# Patient Record
Sex: FEMALE | Race: WHITE | NOT HISPANIC OR LATINO | Employment: OTHER | URBAN - METROPOLITAN AREA
[De-identification: names, ages, dates, MRNs, and addresses within clinical notes are randomized per-mention and may not be internally consistent; named-entity substitution may affect disease eponyms.]

---

## 2018-03-06 ENCOUNTER — OFFICE VISIT (OUTPATIENT)
Dept: FAMILY MEDICINE | Facility: CLINIC | Age: 48
End: 2018-03-06
Payer: COMMERCIAL

## 2018-03-06 VITALS
HEIGHT: 64 IN | BODY MASS INDEX: 22.84 KG/M2 | DIASTOLIC BLOOD PRESSURE: 67 MMHG | WEIGHT: 133.8 LBS | HEART RATE: 71 BPM | TEMPERATURE: 97.8 F | SYSTOLIC BLOOD PRESSURE: 121 MMHG | OXYGEN SATURATION: 100 %

## 2018-03-06 DIAGNOSIS — Z00.00 ROUTINE GENERAL MEDICAL EXAMINATION AT A HEALTH CARE FACILITY: Primary | ICD-10-CM

## 2018-03-06 DIAGNOSIS — I34.1 MITRAL VALVE PROLAPSE DETERMINED BY IMAGING: ICD-10-CM

## 2018-03-06 DIAGNOSIS — R53.83 FATIGUE, UNSPECIFIED TYPE: ICD-10-CM

## 2018-03-06 DIAGNOSIS — Z86.79 H/O MITRAL VALVE PROLAPSE: ICD-10-CM

## 2018-03-06 LAB — HBA1C MFR BLD: 4.7 % (ref 4.3–6)

## 2018-03-06 PROCEDURE — 36415 COLL VENOUS BLD VENIPUNCTURE: CPT | Performed by: FAMILY MEDICINE

## 2018-03-06 PROCEDURE — 84443 ASSAY THYROID STIM HORMONE: CPT | Performed by: FAMILY MEDICINE

## 2018-03-06 PROCEDURE — 83036 HEMOGLOBIN GLYCOSYLATED A1C: CPT | Performed by: FAMILY MEDICINE

## 2018-03-06 PROCEDURE — 99386 PREV VISIT NEW AGE 40-64: CPT | Performed by: FAMILY MEDICINE

## 2018-03-06 RX ORDER — LORAZEPAM 0.5 MG/1
TABLET ORAL
Refills: 0 | COMMUNITY
Start: 2017-12-20 | End: 2018-11-01

## 2018-03-06 RX ORDER — ACYCLOVIR 400 MG/1
TABLET ORAL
Refills: 1 | COMMUNITY
Start: 2017-03-25 | End: 2022-06-23

## 2018-03-06 NOTE — NURSING NOTE
"Chief Complaint   Patient presents with     Physical       Initial /67  Pulse 71  Temp 97.8  F (36.6  C) (Oral)  Ht 5' 4\" (1.626 m)  Wt 133 lb 12.8 oz (60.7 kg)  SpO2 100%  BMI 22.97 kg/m2 Estimated body mass index is 22.97 kg/(m^2) as calculated from the following:    Height as of this encounter: 5' 4\" (1.626 m).    Weight as of this encounter: 133 lb 12.8 oz (60.7 kg).  Medication Reconciliation: complete      Health Maintenance that is potentially due pending provider review:  Pap Smear    Possibly completing today per provider review.    RIMA Mason  "

## 2018-03-06 NOTE — MR AVS SNAPSHOT
After Visit Summary   3/6/2018    Letitia Yee    MRN: [id number]           Patient Information     Date Of Birth          1970        Visit Information        Provider Department      3/6/2018 1:30 PM Shayan Warren MD [facility]        Today's Diagnoses     Routine general medical examination at a health care facility    -  1    Fatigue, unspecified type          Care Instructions      Preventive Health Recommendations  Female Ages 40 to 49    Yearly exam:     See your health care provider every year in order to  1. Review health changes.   2. Discuss preventive care.    3. Review your medicines if your doctor prescribed any.      Get a Pap test every three years (unless you have an abnormal result and your provider advises testing more often).      If you get Pap tests with HPV test, you only need to test every 5 years, unless you have an abnormal result. You do not need a Pap test if your uterus was removed (hysterectomy) and you have not had cancer.      You should be tested each year for STDs (sexually transmitted diseases), if you're at risk.       Ask your doctor if you should have a mammogram.      Have a colonoscopy (test for colon cancer) if someone in your family has had colon cancer or polyps before age 50.       Have a cholesterol test every 5 years.       Have a diabetes test (fasting glucose) after age 45. If you are at risk for diabetes, you should have this test every 3 years.    Shots: Get a flu shot each year. Get a tetanus shot every 10 years.     Nutrition:     Eat at least 5 servings of fruits and vegetables each day.    Eat whole-grain bread, whole-wheat pasta and brown rice instead of white grains and rice.    Talk to your provider about Calcium and Vitamin D.     Lifestyle    Exercise at least 150 minutes a week (an average of 30 minutes a day, 5 days a week). This will help you control your weight and prevent disease.    Limit alcohol to one  drink per day.    No smoking.     Wear sunscreen to prevent skin cancer.    See your dentist every six months for an exam and cleaning.    Preventive Health Recommendations  Female Ages 40 to 49    Yearly exam:     See your health care provider every year in order to  4. Review health changes.   5. Discuss preventive care.    6. Review your medicines if your doctor prescribed any.      Get a Pap test every three years (unless you have an abnormal result and your provider advises testing more often).      If you get Pap tests with HPV test, you only need to test every 5 years, unless you have an abnormal result. You do not need a Pap test if your uterus was removed (hysterectomy) and you have not had cancer.      You should be tested each year for STDs (sexually transmitted diseases), if you're at risk.       Ask your doctor if you should have a mammogram.      Have a colonoscopy (test for colon cancer) if someone in your family has had colon cancer or polyps before age 50.       Have a cholesterol test every 5 years.       Have a diabetes test (fasting glucose) after age 45. If you are at risk for diabetes, you should have this test every 3 years.    Shots: Get a flu shot each year. Get a tetanus shot every 10 years.     Nutrition:     Eat at least 5 servings of fruits and vegetables each day.    Eat whole-grain bread, whole-wheat pasta and brown rice instead of white grains and rice.    Talk to your provider about Calcium and Vitamin D.     Lifestyle    Exercise at least 150 minutes a week (an average of 30 minutes a day, 5 days a week). This will help you control your weight and prevent disease.    Limit alcohol to one drink per day.    No smoking.     Wear sunscreen to prevent skin cancer.    See your dentist every six months for an exam and cleaning.          Follow-ups after your visit        Follow-up notes from your care team     Return if symptoms worsen or fail to improve.      Future tests that were  "ordered for you today     Open Future Orders        Priority Expected Expires Ordered    Echocardiogram Complete Routine  3/6/2019 3/6/2018            Who to contact     If you have questions or need follow up information about today's clinic visit or your schedule please contact [facility] directly at [phone].  Normal or non-critical lab and imaging results will be communicated to you by MyChart, letter or phone within 4 business days after the clinic has received the results. If you do not hear from us within 7 days, please contact the clinic through [employer] or phone. If you have a critical or abnormal lab result, we will notify you by phone as soon as possible.  Submit refill requests through [employer] or call your pharmacy and they will forward the refill request to us. Please allow 3 business days for your refill to be completed.          Additional Information About Your Visit        MyChart Information     [employer] lets you send messages to your doctor, view your test results, renew your prescriptions, schedule appointments and more. To sign up, go to [email or web address] . Click on \"Log in\" on the left side of the screen, which will take you to the Welcome page. Then click on \"Sign up Now\" on the right side of the page.     You will be asked to enter the access code listed below, as well as some personal information. Please follow the directions to create your username and password.     Your access code is: [email or web address]  Expires: 2018  2:21 PM     Your access code will  in 90 days. If you need help or a new code, please call your [facility] clinic or [phone].        Care EveryWhere ID     This is your Care EveryWhere ID. This could be used by other organizations to access your [facility] medical records  [id number]        Your Vitals Were     Pulse Temperature Height Pulse Oximetry BMI (Body Mass Index)       71 97.8  F (36.6  C) (Oral) 5' 4\" (1.626 m) 100% 22.97 kg/m2        " Blood Pressure from Last 3 Encounters:   03/06/18 121/67    Weight from Last 3 Encounters:   03/06/18 133 lb 12.8 oz (60.7 kg)              We Performed the Following     Hemoglobin A1c     TSH        Primary Care Provider Office Phone # Fax #    [facility] [phone] [address]  [facility] [address]        Equal Access to Services     SARIAH AGUIRRE : Hadii aad ku hadasho Soomaali, waaxda luqadaha, qaybta kaalmada adeegyada, waxay idiin hayaan adeeg kharash la'aan ah. So [facility] [phone].    ATENCIÓN: Si habla español, tiene a rivera disposición servicios gratuitos de asistencia lingüística. Llame al [phone].    We comply with applicable federal civil rights laws and [place] laws. We do not discriminate on the basis of race, color, national origin, age, disability, sex, sexual orientation, or gender identity.            Thank you!     Thank you for choosing [facility]  for your care. Our goal is always to provide you with excellent care. Hearing back from our patients is one way we can continue to improve our services. Please take a few minutes to complete the written survey that you may receive in the mail after your visit with us. Thank you!             Your Updated Medication List - Protect others around you: Learn how to safely use, store and throw away your medicines at www.disposemymeds.org.          This list is accurate as of 3/6/18  2:21 PM.  Always use your most recent med list.                   Brand Name Dispense Instructions for use Diagnosis    acyclovir 400 MG tablet    ZOVIRAX     TK 1 T PO  BID. TK 1 T PO BID FOR 5 DAYS FOR EACH OUTBREAK        LORazepam 0.5 MG tablet    ATIVAN     TK 1 T PO Q 8 H PRA

## 2018-03-06 NOTE — PATIENT INSTRUCTIONS
Preventive Health Recommendations  Female Ages 40 to 49    Yearly exam:     See your health care provider every year in order to  1. Review health changes.   2. Discuss preventive care.    3. Review your medicines if your doctor prescribed any.      Get a Pap test every three years (unless you have an abnormal result and your provider advises testing more often).      If you get Pap tests with HPV test, you only need to test every 5 years, unless you have an abnormal result. You do not need a Pap test if your uterus was removed (hysterectomy) and you have not had cancer.      You should be tested each year for STDs (sexually transmitted diseases), if you're at risk.       Ask your doctor if you should have a mammogram.      Have a colonoscopy (test for colon cancer) if someone in your family has had colon cancer or polyps before age 50.       Have a cholesterol test every 5 years.       Have a diabetes test (fasting glucose) after age 45. If you are at risk for diabetes, you should have this test every 3 years.    Shots: Get a flu shot each year. Get a tetanus shot every 10 years.     Nutrition:     Eat at least 5 servings of fruits and vegetables each day.    Eat whole-grain bread, whole-wheat pasta and brown rice instead of white grains and rice.    Talk to your provider about Calcium and Vitamin D.     Lifestyle    Exercise at least 150 minutes a week (an average of 30 minutes a day, 5 days a week). This will help you control your weight and prevent disease.    Limit alcohol to one drink per day.    No smoking.     Wear sunscreen to prevent skin cancer.    See your dentist every six months for an exam and cleaning.    Preventive Health Recommendations  Female Ages 40 to 49    Yearly exam:     See your health care provider every year in order to  4. Review health changes.   5. Discuss preventive care.    6. Review your medicines if your doctor prescribed any.      Get a Pap test every three years (unless you  have an abnormal result and your provider advises testing more often).      If you get Pap tests with HPV test, you only need to test every 5 years, unless you have an abnormal result. You do not need a Pap test if your uterus was removed (hysterectomy) and you have not had cancer.      You should be tested each year for STDs (sexually transmitted diseases), if you're at risk.       Ask your doctor if you should have a mammogram.      Have a colonoscopy (test for colon cancer) if someone in your family has had colon cancer or polyps before age 50.       Have a cholesterol test every 5 years.       Have a diabetes test (fasting glucose) after age 45. If you are at risk for diabetes, you should have this test every 3 years.    Shots: Get a flu shot each year. Get a tetanus shot every 10 years.     Nutrition:     Eat at least 5 servings of fruits and vegetables each day.    Eat whole-grain bread, whole-wheat pasta and brown rice instead of white grains and rice.    Talk to your provider about Calcium and Vitamin D.     Lifestyle    Exercise at least 150 minutes a week (an average of 30 minutes a day, 5 days a week). This will help you control your weight and prevent disease.    Limit alcohol to one drink per day.    No smoking.     Wear sunscreen to prevent skin cancer.    See your dentist every six months for an exam and cleaning.

## 2018-03-06 NOTE — PROGRESS NOTES
SUBJECTIVE:   CC: Letitia Yee is an 47 year old woman who presents for preventive health visit.     Healthy Habits:    Do you get at least three servings of calcium containing foods daily (dairy, green leafy vegetables, etc.)? yes    Amount of exercise or daily activities, outside of work: 6 day(s) per week    Problems taking medications regularly No    Medication side effects: No    Have you had an eye exam in the past two years? no    Do you see a dentist twice per year? no    Do you have sleep apnea, excessive snoring or daytime drowsiness?no      The patient past medical history is significant for mitral valve prolapse. She reports that it was being followed by [facility] yrs ago. She was evaluated for syncopal episode 15 yrs ago which was attributed to the mitral valve prolapse. Today, she reports intermittent episodes of sudden severe fatigue and lack of energy, feeling that she is about to faint but does not. Episodes have been occurring 2 times per week. She denies noticing any palpitations, shortness of breath or chest pains during these episodes. Did not correlate it to food intake.  The patient is athletic she lifts weights 5-6 times per week.  She is also an avid biker and tries to bike as often as she could. Able to bike up to 30 miles without concerns.       Today's PHQ-2 Score: No flowsheet data found.    Abuse: Current or Past(Physical, Sexual or Emotional)- No  Do you feel safe in your environment - Yes    Social History   Substance Use Topics     Smoking status: Not on file     Smokeless tobacco: Not on file     Alcohol use Not on file     If you drink alcohol do you typically have >3 drinks per day or >7 drinks per week? No                     Reviewed orders with patient.  Reviewed health maintenance and updated orders accordingly - Yes    Mammo discussed, not appropriate for or declined by this patient.    Pertinent mammograms are reviewed under the imaging tab.  History of abnormal Pap  "smear: NO - age 30-65 PAP every 5 years with negative HPV co-testing recommended. Last done in 04/2015    Reviewed and updated as needed this visit by clinical staff  Allergies  Meds         Reviewed and updated as needed this visit by Provider            ROS:  C: NEGATIVE for fever, chills, change in weight  I: NEGATIVE for worrisome rashes, moles or lesions  E: NEGATIVE for vision changes or irritation  ENT: NEGATIVE for ear, mouth and throat problems  R: NEGATIVE for significant cough or SOB  B: NEGATIVE for masses, tenderness or discharge  CV: NEGATIVE for chest pain, palpitations or peripheral edema  GI: NEGATIVE for nausea, abdominal pain, heartburn, or change in bowel habits  : NEGATIVE for unusual urinary or vaginal symptoms. Periods are regular.  M: NEGATIVE for significant arthralgias or myalgia  N: NEGATIVE for weakness, dizziness or paresthesias  P: NEGATIVE for changes in mood or affect    OBJECTIVE:   /67  Pulse 71  Temp 97.8  F (36.6  C) (Oral)  Ht 5' 4\" (1.626 m)  Wt 133 lb 12.8 oz (60.7 kg)  SpO2 100%  BMI 22.97 kg/m2  EXAM:  GENERAL: healthy, alert and no distress  NECK: no adenopathy, no asymmetry, masses, or scars and thyroid normal to palpation  RESP: lungs clear to auscultation - no rales, rhonchi or wheezes  CV: regular rate and rhythm, normal S1 S2, no S3 or S4, no murmur, click or rub, no peripheral edema and peripheral pulses strong  ABDOMEN: soft, nontender, no hepatosplenomegaly, no masses and bowel sounds normal  MS: no gross musculoskeletal defects noted, no edema    ASSESSMENT/PLAN:       ICD-10-CM    1. Routine general medical examination at a health care facility Z00.00    2. Fatigue, unspecified type R53.83 TSH     Hemoglobin A1c   3. H/O mitral valve prolapse Z86.79 Echocardiogram Complete     Causes of current intermittent episodes of sudden fatigue and weakness were discussed with the patient. Patient was advised to keep a diary with food, exercise and ADLs prior " to these episodes. Will start with TSH and HgA1C. Patient was advised to get the Echo to evaluate for worsening mitral valve prolapse.     COUNSELING:   Reviewed preventive health counseling, as reflected in patient instructions       Regular exercise       Healthy diet/nutrition     has no tobacco history on file.    There is no height or weight on file to calculate BMI.       Counseling Resources:  ATP IV Guidelines  Pooled Cohorts Equation Calculator  Breast Cancer Risk Calculator  FRAX Risk Assessment  ICSI Preventive Guidelines  Dietary Guidelines for Americans, 2010  USDA's MyPlate  ASA Prophylaxis  Lung CA Screening    Shayan Warren MD  [facility]

## 2018-03-07 LAB — TSH SERPL DL<=0.005 MIU/L-ACNC: 2.87 MU/L (ref 0.4–4)

## 2018-04-20 ENCOUNTER — HOSPITAL ENCOUNTER (OUTPATIENT)
Dept: CARDIOLOGY | Facility: CLINIC | Age: 48
Discharge: HOME | End: 2018-04-20
Attending: FAMILY MEDICINE | Admitting: FAMILY MEDICINE
Payer: COMMERCIAL

## 2018-04-20 DIAGNOSIS — Z86.79 H/O MITRAL VALVE PROLAPSE: ICD-10-CM

## 2018-04-20 PROCEDURE — 93306 TTE W/DOPPLER COMPLETE: CPT

## 2018-04-20 PROCEDURE — 93306 TTE W/DOPPLER COMPLETE: CPT | Mod: 26 | Performed by: INTERNAL MEDICINE

## 2018-04-20 NOTE — PROGRESS NOTES
Dear Letitia,   We received the results of your echocardiogram.  Results shows mild enlargement of the left ventricle of the heart.  It also shows moderate mitral regurgitation which means that the valve leaks the blood back to the left ventricle. They also noted the moderate mitral valve prolapse.   I would recommend seeing a cardiology to address these abnormalities. This should not alarm you, your heart has been functioning with these abnormalities for months or maybe years.     I placed a referral to the [facility]     [address]    [id number]    [place] [address]    Appointments:   [phone]     Let us know if you have any questions, I can give you a call on Monday.     Take Care   Shayan Warren MD

## 2018-04-25 ENCOUNTER — TELEPHONE (OUTPATIENT)
Dept: OTHER | Facility: CLINIC | Age: 48
End: 2018-04-25

## 2018-04-26 ENCOUNTER — OFFICE VISIT (OUTPATIENT)
Dept: CARDIOLOGY | Facility: CLINIC | Age: 48
End: 2018-04-26
Attending: FAMILY MEDICINE
Payer: COMMERCIAL

## 2018-04-26 VITALS
HEIGHT: 64 IN | WEIGHT: 136.1 LBS | HEART RATE: 60 BPM | DIASTOLIC BLOOD PRESSURE: 80 MMHG | BODY MASS INDEX: 23.23 KG/M2 | OXYGEN SATURATION: 100 % | SYSTOLIC BLOOD PRESSURE: 130 MMHG

## 2018-04-26 DIAGNOSIS — I34.0 MITRAL REGURGITATION, MYXOMATOUS: Primary | ICD-10-CM

## 2018-04-26 PROCEDURE — 93000 ELECTROCARDIOGRAM COMPLETE: CPT | Performed by: INTERNAL MEDICINE

## 2018-04-26 PROCEDURE — 99204 OFFICE O/P NEW MOD 45 MIN: CPT | Performed by: INTERNAL MEDICINE

## 2018-04-26 RX ORDER — ALBUTEROL SULFATE 90 UG/1
2 AEROSOL, METERED RESPIRATORY (INHALATION) EVERY 4 HOURS PRN
COMMUNITY
End: 2018-08-23

## 2018-04-26 NOTE — PATIENT INSTRUCTIONS
1. Cardiac MRI.    2. Labs - NT pro BNP    3. Chest x-ray.    4. Cardiac pulmonary exercise testing. This will be done at the [facility].    5. Follow up in my clinic to discuss test results.    If you have any questions or concerns, please call my nurse Lyla Cisneros at [phone].

## 2018-04-26 NOTE — LETTER
4/26/2018      [facility]  [address]  [facility] [address]      RE: Letitia Yee       Dear Colleague,    I had the pleasure of seeing Letitia Yee in the [facility].    Service Date: 04/26/2018      PRIMARY CARE AND REFERRING PROVIDER:  Shayan Warren MD      REASON FOR VISIT:  Finding of mitral regurgitation secondary to mitral valve prolapse on echocardiogram.      HISTORY OF PRESENT ILLNESS:  Ms. Yee is a very pleasant, 47-year-old  lady who is accompanied by her , Samuel, who is an .  The patient is 47 years old,  with 1 child with no other significant medical history.  She is a former smoker who quit several years ago.  Even when she was a child, she was aware of mitral valve prolapse and had a murmur, which has not been followed for several years in adulthood.  More recently, she was evaluated because the murmur was louder on auscultation, and she had nonspecific fatigue.  She was found to have at least moderate mitral regurgitation leading to this referral.      In terms of symptoms, the primary one is that of fatigue.  It is very nonspecific by description and has been present for at least a few years.  However, the patient enjoys quite an active lifestyle.  She has a young child, participates in [employer] dance classes a few times a month and is able to do this without limitation.  There are certainly no features of symptoms of heart failure, such as dyspnea on exertion, lower extremity edema, orthopnea or PND.  She has rare palpitations, but does not have a previous diagnosis of atrial fibrillation.  There is no concerning family history.        I personally reviewed her echocardiogram images.  Both mitral valve leaflets are significantly thickened consistent with a myxomatous mitral valve disease, but possible prolapse of the anterior leaflet causing a posturally directed eccentric  jet of mitral regurgitation that is at least moderate in severity.  The mitral valve hemodynamics were not reliable due to the eccentricity of the jet.  Her left ventricle was mildly dilated (the end-diastolic diameter was 6.1 cm and systolic 3.5 cm), LVEF was only visually estimated but not quantified at 60%-65%, left atrium was moderately dilated with a volume index of 40.1 mL/m2, RV function was normal, RVSP could not be estimated, there was trace to mild tricuspid regurgitation, and she was in sinus rhythm.      ECG done today showed sinus bradycardia of 54 BPM with normal cardiac intervals.      Her recent TSH was 2.8.  I do not have any other labs or chest x-ray.      Please see my attached note in Epic for a comprehensive review of systems, past medical, surgical, social and family history.         PHYSICAL EXAMINATION:   VITAL SIGNS:  /80, pulse 60 per minute and regular, height 1.626 m (5 feet 4 inches), weight 61.7 kg (136 pounds), saturations 100% on room air, BMI 23.4 kg/m2.   CONSTITUTIONAL:  Slim, comfortable at rest.   EYES:  No pallor or icterus.   ENT:  Good dental hygiene.  No cyanosis or pallor.   CARDIOVASCULAR:  Her jugular venous pressure is normal.  Carotid pulse is normal.  Apical impulse is undisplaced and normal to palpation without heave or thrill.  Regular heart sounds.  She has a well-audible mid systolic click as well as a mid to late systolic murmur consistent with her echocardiogram findings.  No diastolic rumble.     RESPIRATORY:  Bilateral good air entry.     GASTROINTESTINAL:  Soft, nontender.     NEUROPSYCHIATRIC:  Oriented x3.  Normal affect.     EXTREMITIES:  No edema or clubbing.      DIAGNOSES:   1.  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   2.  Mild left ventricular dilatation, LVEF of 60%-65%.   3.  Chronic fatigue.      ASSESSMENT/PLAN:  I had a detailed conversation with  the patient and her  and explained the complex etiology of myxomatous mitral valve disease with the spectrum varying from isolated mitral valve prolapse to bileaflet degeneration as she has, the mechanism of mitral regurgitation, the parameters that would help us decide when to intervene and that currently surgery is the only treatment.  Based on her physical examination and echocardiogram, I do not think we have sufficient data to ascertain the true severity of the mitral regurgitation.  The jet is eccentric, both leaflets are thickened, but what points to more than moderate severity is the fact that her left ventricle is mildly dilated, we do not have an accurate estimate of the LVEF, and her left atrium is also moderately dilated with some systolic blunting in the pulmonary vein Doppler.  Given her young age and nonspecific symptoms of fatigue, I think additional testing to ascertain the degree of mitral regurgitation, extent of symptomatic limitation and LV size and LVEF would help us determine if we need to intervene on her sooner or wait longer and perform imaging surveillance.      Due to this, I recommend the followin.  Cardiac MRI.   2.  Complete set of labs, including NT-proBNP.   3.  Chest x-ray.     4.  Cardiopulmonary exercise testing.  This is not done here at [facility], and she is open to going to the [facility].   5.  Follow up in my clinic to discuss test results.      The patient had several questions regarding the need for this additional testing, and I tried to explain it to her to the best of my ability.  I spent 45 minutes with the patient; greater than 50% of the time was spent in counseling and coordination of care.      cc:   Shayan Warren MD   [address] BETINA PAN MD          D: 2018   T: 2018   MT: seven      Name:     MARKO SALDAÑA   MRN:      [id number]         Account:      [id number]   :      1970           Service Date: 2018      Document: [id number]         Outpatient Encounter Prescriptions as of 2018   Medication Sig Dispense Refill     acyclovir (ZOVIRAX) 400 MG tablet TK 1 T PO  BID. TK 1 T PO BID FOR 5 DAYS FOR EACH OUTBREAK  1     albuterol (PROAIR HFA/PROVENTIL HFA/VENTOLIN HFA) 108 (90 Base) MCG/ACT Inhaler Inhale 2 puffs into the lungs every 4 hours as needed for shortness of breath / dyspnea or wheezing       LORazepam (ATIVAN) 0.5 MG tablet TK 1 T PO Q 8 H PRA  0     No facility-administered encounter medications on file as of 2018.      Again, thank you for allowing me to participate in the care of your patient.      Sincerely,    Kelli Solis MD     [facility]
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
## 2018-04-26 NOTE — MR AVS SNAPSHOT
After Visit Summary   4/26/2018    Letitia Yee    MRN: [id number]           Patient Information     Date Of Birth          1970        Visit Information        Provider Department      4/26/2018 11:15 AM Kelli Solis MD [facility]        Today's Diagnoses     Mitral regurgitation, myxomatous    -  1      Care Instructions    1. Cardiac MRI.    2. Labs - NT pro BNP    3. Chest x-ray.    4. Cardiac pulmonary exercise testing. This will be done at the [facility].    5. Follow up in my clinic to discuss test results.    If you have any questions or concerns, please call my nurse Lyla Cisneros at [phone].          Follow-ups after your visit        Additional Services     Follow-Up with Pulmonary Hypertension Clinic                 Future tests that were ordered for you today     Open Future Orders        Priority Expected Expires Ordered    Echocardiogram Routine 5/17/2018 4/26/2019 4/26/2018    Follow-Up with Pulmonary Hypertension Clinic Routine 5/26/2018 4/26/2019 4/26/2018    Holter Monitor 24 hour - Adult Routine 4/30/2018 6/10/2018 4/26/2018    XR Chest 2 Views Routine 5/3/2018 4/26/2019 4/26/2018    N terminal pro BNP outpatient Routine 5/3/2018 4/26/2019 4/26/2018    MRI Cardiac w/contrast Routine 4/30/2018 4/26/2019 4/26/2018            Who to contact     If you have questions or need follow up information about today's clinic visit or your schedule please contact [facility] directly at [phone].  Normal or non-critical lab and imaging results will be communicated to you by MyChart, letter or phone within 4 business days after the clinic has received the results. If you do not hear from us within 7 days, please contact the clinic through MyChart or phone. If you have a critical or abnormal lab result, we will notify you by phone as soon as possible.  Submit refill requests  "through [employer] or call your pharmacy and they will forward the refill request to us. Please allow 3 business days for your refill to be completed.          Additional Information About Your Visit        Bubbleballhart Information     [employer] gives you secure access to your electronic health record. If you see a primary care provider, you can also send messages to your care team and make appointments. If you have questions, please call your primary care clinic.  If you do not have a primary care provider, please call [phone] and they will assist you.        Care EveryWhere ID     This is your Care EveryWhere ID. This could be used by other organizations to access your [facility] medical records  [id number]        Your Vitals Were     Pulse Height Pulse Oximetry BMI (Body Mass Index)          60 1.626 m (5' 4\") 100% 23.36 kg/m2         Blood Pressure from Last 3 Encounters:   04/26/18 130/80   03/06/18 121/67    Weight from Last 3 Encounters:   04/26/18 61.7 kg (136 lb 1.6 oz)   03/06/18 60.7 kg (133 lb 12.8 oz)              We Performed the Following     EKG 12-lead complete w/read - Clinics        Primary Care Provider Office Phone # Fax #    [facility] [phone] [address]  [facility] [address]        Equal Access to Services     SARIAH AGUIRRE : Hadii aad ku hadasho Soomaali, waaxda luqadaha, qaybta kaalmada adeegyada, waxay idiin haykaren phillips . So [facility] [phone].    ATENCIÓN: Si habla español, tiene a rivera disposición servicios gratuitos de asistencia lingüística. Llame al [phone].    We comply with applicable federal civil rights laws and [place] laws. We do not discriminate on the basis of race, color, national origin, age, disability, sex, sexual orientation, or gender identity.            Thank you!     Thank you for choosing [facility]  for your care. Our goal is always to provide you with excellent " care. Hearing back from our patients is one way we can continue to improve our services. Please take a few minutes to complete the written survey that you may receive in the mail after your visit with us. Thank you!             Your Updated Medication List - Protect others around you: Learn how to safely use, store and throw away your medicines at www.disposemymeds.org.          This list is accurate as of 4/26/18 12:10 PM.  Always use your most recent med list.                   Brand Name Dispense Instructions for use Diagnosis    acyclovir 400 MG tablet    ZOVIRAX     TK 1 T PO  BID. TK 1 T PO BID FOR 5 DAYS FOR EACH OUTBREAK        albuterol 108 (90 Base) MCG/ACT Inhaler    PROAIR HFA/PROVENTIL HFA/VENTOLIN HFA     Inhale 2 puffs into the lungs every 4 hours as needed for shortness of breath / dyspnea or wheezing        LORazepam 0.5 MG tablet    ATIVAN     TK 1 T PO Q 8 H PRA

## 2018-05-08 ENCOUNTER — DOCUMENTATION ONLY (OUTPATIENT)
Dept: CARDIOLOGY | Facility: CLINIC | Age: 48
End: 2018-05-08

## 2018-05-08 NOTE — PROGRESS NOTES
Patient had sent me a [employer]ar[employer] message last week, inquiring about the rationale for additional testing.  I called her back today and had a detailed conversation.  As outlined in my clinic visit note, the main reason to undergo cardiac MRI is to ascertain whether she has more than moderate mitral regurgitation.  The factors that are incongruent with just moderate mitral regurgitation are -  her left ventricle is mildly dilated, LVEF appears less than 65%, she has at least moderate atrial enlargement.  The description of her fatigue is also rather nonspecific and cardiopulmonary exercise stress testing will be helpful.    Patient shared that her current insurance coverage is less than optimal, and she would like to postpone her testing to 3 months down the line, when she will have additional insurance coverage.  I think this is reasonable.    She has my business contact details and will call us to schedule further testing and clinic follow-up.    Dr. RAZIA Solis MD  [facility] [place].
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
## 2018-05-08 NOTE — PROGRESS NOTES
Service Date: 04/26/2018      PRIMARY CARE AND REFERRING PROVIDER:  Shayan Warren MD      REASON FOR VISIT:  Finding of mitral regurgitation secondary to mitral valve prolapse on echocardiogram.      HISTORY OF PRESENT ILLNESS:    Ms. Yee is a very pleasant, 47-year-old  lady who is accompanied by her , Samuel, who is an .  The patient is 47 years old,  with 1 child with no other significant medical history.  She is a former smoker who quit several years ago.      I am seeing her for mitral regurgitation. Even when she was a child, she was aware of mitral valve prolapse and had a murmur, which has not been followed for several years in adulthood.  More recently, she was evaluated for non-specific fatigue and the the murmur was thought to be louder on auscultation. Echocardiogram showed at least moderate mitral regurgitation leading to this referral.      He primary symptoms is fatigue. It is nonspecific by description and has been present for at least a few years.  However, the patient enjoys quite an active lifestyle.  She has a young child, participates in [employer] dance classes a few times a month and is able to do this without limitation.  She denies dyspnea on exertion, lower extremity edema, orthopnea or PND.  She has rare palpitations, but does not have a previous diagnosis of atrial fibrillation.  There is no concerning family history.        I personally reviewed her echocardiogram images.  Both mitral valve leaflets are significantly thickened consistent with myxomatous mitral valve disease, with possible prolapse of the anterior leaflet causing a posturally directed eccentric jet of mitral regurgitation that is at least moderate in severity.  The mitral valve hemodynamics were not reliable due to the eccentricity of the jet.  Her left ventricle was borderline dilated (the end-diastolic diameter was 6.1 cm and end systolic 3.5 cm), LVEF was only visually  estimated at 60%-65%, left atrium was moderately dilated with a volume index of 40.1 mL/m2, RV function was normal, RVSP could not be estimated, there was trace to mild tricuspid regurgitation, and she was in sinus rhythm.      ECG done today showed sinus bradycardia of 54 BPM with normal cardiac intervals.      Her recent TSH was 2.8.  I do not have any other labs or chest x-ray.      Please see my attached note in Epic for a comprehensive review of systems, past medical, surgical, social and family history.         PHYSICAL EXAMINATION:   VITAL SIGNS:  /80, pulse 60 per minute and regular, height 1.626 m (5 feet 4 inches), weight 61.7 kg (136 pounds), saturations 100% on room air, BMI 23.4 kg/m2.   CONSTITUTIONAL:  Slim, comfortable at rest.   EYES:  No pallor or icterus.   ENT:  Good dental hygiene.  No cyanosis or pallor.   CARDIOVASCULAR:  Her jugular venous pressure is normal.  Carotid pulse is normal.  Apical impulse is undisplaced and normal to palpation without heave or thrill.  Regular heart sounds.  She has a well-audible mid systolic click and a mid to late systolic murmur consistent with her echocardiogram findings.  No diastolic rumble.     RESPIRATORY:  Bilateral good air entry.     GASTROINTESTINAL:  Soft, nontender.     NEUROPSYCHIATRIC:  Oriented x3.  Normal affect.     EXTREMITIES:  No edema or clubbing.      DIAGNOSES:   1.  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   2.  Mild left ventricular dilatation, LVEF of 60%-65%.   3.  Chronic fatigue.      ASSESSMENT/PLAN:    I had a detailed conversation with the patient and her  and explained the complex etiology of myxomatous mitral valve disease with a spectrum varying from isolated mitral valve prolapse to bileaflet involvement as she has, the mechanism of mitral regurgitation, the parameters that would help us decide when to intervene and  that currently surgery is the only treatment.  Based on her physical examination and echocardiogram, I do not think we have sufficient data to ascertain the true severity of the mitral regurgitation.  The jet is eccentric, both leaflets are thickened, but what suggests more than moderate severity is the fact that her left ventricle is mildly dilated, we do not have an accurate estimate of the LVEF, and her left atrium is also moderately dilated with some systolic blunting in the pulmonary vein Doppler.  Given her young age and nonspecific symptoms of fatigue, I think additional testing to ascertain the degree of mitral regurgitation, extent of symptomatic limitation and LV size and LVEF would help us determine if we need to intervene on her sooner or wait longer and perform imaging surveillance.      I recommend the followin.  Cardiac MRI.   2.  Complete set of labs, including NT-proBNP.   3.  Chest x-ray.     4.  Cardiopulmonary exercise testing.  This is not done here at [facility], and she is open to going to the [facility].   5.  Follow up in my clinic to discuss test results.      The patient had several questions regarding the need for this additional testing, and I tried to explain it to her to the best of my ability.  I spent 50 minutes with the patient; greater than 50% of the time was spent in counseling and coordination of care.      cc:   Shayan Warren MD   [address] BETINA PAN MD             D: 2018   T: 2018   MT: seven      Name:     MARKO SALDAÑA   MRN:      [id number]        Account:      [id number]   :      1970           Service Date: 2018      Document: [id number]

## 2018-06-04 ENCOUNTER — NURSE TRIAGE (OUTPATIENT)
Dept: NURSING | Facility: CLINIC | Age: 48
End: 2018-06-04

## 2018-06-04 NOTE — TELEPHONE ENCOUNTER
"I connected with scheduling for an appointment.  Nathalie Montana RN-BC  [facility]      Reason for Disposition    Eyelid is red and painful (or tender to touch)     Lump on the inside of the eye lid. She just noticed it while talking on the phone.    Additional Information    Negative: Unresponsive, passed out or very weak     Took Zyrtec for two nights. Eye is crusted shut.    Negative: Difficulty breathing or wheezing    Negative: [1] Difficulty swallowing or slurred speech AND [2] sudden onset    Negative: Sounds like a life-threatening emergency to the triager    Negative: Recent injury to the eye    Negative: Entire face is swollen    Negative: Sacs of clear fluid (blisters) on whites of eyes (allergic cysts)    Negative: Contact with pollen, other allergic substance or eyedrops    Negative: [1] Bee sting AND [2] within last 24 hours    Negative: Insect bite suspected    Negative: Sty suspected (small, painful red lump present on lid margin    Negative: Yellow or green discharge (pus) in the eye    Negative: Redness of white area (sclera) of eye(s)    Negative: [1] SEVERE eyelid swelling (i.e., shut or almost) AND [2] fever    Negative: [1] Eyelid (outer) is very red AND [2] fever    Negative: Patient sounds very sick or weak to the triager    Negative: [1] Pregnant > 20 weeks AND [2] sudden weight gain (i.e., more than 3 lbs or 1.4 kg in one week)    Negative: [1] SEVERE eyelid swelling (i.e., shut or almost) AND [2] involves both eyes      (Exception: itchy eyes, which  are probably an allergic reaction)    Negative: [1] SEVERE eyelid swelling on one side AND [2] red and painful (or tender to touch)    Negative: [1] SEVERE eyelid swelling on one side AND [2] sinus pain or pressure    Negative: [1] MILD swelling AND [2] fever    Negative: [1] Painful rash AND [2] multiple small blisters grouped together (i.e., dermatomal distribution or \"band\" or \"stripe\")    Negative: [1] SEVERE eyelid swelling " (i.e., shut or almost) AND [2] involves both eyes AND [2] itchy    Negative: MODERATE-SEVERE eyelid swelling on one side  (Exception: due to a mosquito bite)    Negative: [1] MILD eyelid swelling (puffiness) AND [2] sinus pain or pressure    Protocols used: EYE - SWELLING-ADULT-AH

## 2018-06-07 ENCOUNTER — OFFICE VISIT (OUTPATIENT)
Dept: FAMILY MEDICINE | Facility: CLINIC | Age: 48
End: 2018-06-07
Payer: COMMERCIAL

## 2018-06-07 VITALS
SYSTOLIC BLOOD PRESSURE: 120 MMHG | OXYGEN SATURATION: 98 % | WEIGHT: 133 LBS | RESPIRATION RATE: 16 BRPM | HEART RATE: 76 BPM | TEMPERATURE: 98.1 F | BODY MASS INDEX: 22.71 KG/M2 | DIASTOLIC BLOOD PRESSURE: 72 MMHG | HEIGHT: 64 IN

## 2018-06-07 DIAGNOSIS — H00.012 HORDEOLUM EXTERNUM OF RIGHT LOWER EYELID: ICD-10-CM

## 2018-06-07 DIAGNOSIS — G43.109 MIGRAINE WITH AURA AND WITHOUT STATUS MIGRAINOSUS, NOT INTRACTABLE: ICD-10-CM

## 2018-06-07 DIAGNOSIS — H00.022 HORDEOLUM INTERNUM OF RIGHT LOWER EYELID: Primary | ICD-10-CM

## 2018-06-07 PROCEDURE — 99214 OFFICE O/P EST MOD 30 MIN: CPT | Performed by: FAMILY MEDICINE

## 2018-06-07 RX ORDER — GENTAMICIN SULFATE 3 MG/ML
1 SOLUTION/ DROPS OPHTHALMIC 3 TIMES DAILY
Qty: 2 ML | Refills: 0 | Status: SHIPPED | OUTPATIENT
Start: 2018-06-07 | End: 2018-06-14

## 2018-06-07 NOTE — MR AVS SNAPSHOT
After Visit Summary   6/7/2018    Letitia Yee    MRN: [id number]           Patient Information     Date Of Birth          1970        Visit Information        Provider Department      6/7/2018 1:20 PM Nicky Lopez MD [facility]        Today's Diagnoses     Hordeolum internum of right lower eyelid    -  1    Hordeolum externum of right lower eyelid        Migraine with aura and without status migrainosus, not intractable          Care Instructions      Sty (or Stye)  A sty is an infection of the oil gland of the eyelid. It may develop into a small pocket of pus (an abscess). This can cause pain, redness, and swelling. In early stages, a sty is treated with antibiotic cream, eye drops, or a small towel soaked in warm water (a warm compress). More severe cases may need to be opened and drained by a healthcare provider.  Home care    Eye drops or ointment are usually prescribed to treat the infection. Use these as directed.     Artificial tears may also be used to lubricate the eye and make it more comfortable. You can buy these over the counter without a prescription. Talk with your healthcare provider before using any over-the-counter treatment for a sty.    Apply a warm, damp towel to the affected eye for at least 5 minutes, 3 to 4 times a day for a week. Warm compresses open the pores and speed the healing. But if the compresses are too hot, they may burn your eyelid.    Sometimes the sty will drain with this treatment alone. If this happens, keep using the antibiotic until all the redness and swelling are gone.    Wash your hands before and after touching the infected eyelid to avoid spreading the infection.    Don t squeeze or try to break open the sty.  Follow-up care  Follow up with your healthcare provider, or as advised.   When to seek medical advice  Call your healthcare provider right away if any of these occur:    Increase in swelling or redness around the  eyelid after 48 to 72 hours    Increase in eye pain or the eyelid blisters    Increase in warmth--the eyelid feels hot    Drainage of blood or thick pus from the sty    Blister on the eyelid    Inability to open the eyelid due to swelling    Fever of 100.4 F (38 C) or above, or as directed by your provider    Vision changes    Headache or stiff neck    The sty comes back  Date Last Reviewed: 8/1/2017 2000-2017 [employer]. [address] [place] [address]. All rights reserved. This information is not intended as a substitute for professional medical care. Always follow your healthcare professional's instructions.      [facility],Phone [phone]            Follow-ups after your visit        Additional Services     OPHTHALMOLOGY ADULT REFERRAL       Your provider has referred you to: [facility]: Eye [facility] - [place] [phone]   [email or web address]  FHN: [facility]. [facility] [place] [phone] [email or web address]  N: [facility] [phone]    Please be aware that coverage of these services is subject to the terms and limitations of your health insurance plan.  Call member services at your health plan with any benefit or coverage questions.      Please bring the following with you to your appointment:    (1) Any X-Rays, CTs or MRIs which have been performed.  Contact the facility where they were done to arrange for  prior to your scheduled appointment.    (2) List of current medications  (3) This referral request   (4) Any documents/labs given to you for this referral                  Who to contact     If you have questions or need follow up information about today's clinic visit or your schedule please contact [facility] directly at [phone].  Normal or non-critical lab and imaging results will be communicated to you by MyChart, letter or phone within 4 business  "days after the clinic has received the results. If you do not hear from us within 7 days, please contact the clinic through [employer] or phone. If you have a critical or abnormal lab result, we will notify you by phone as soon as possible.  Submit refill requests through [employer] or call your pharmacy and they will forward the refill request to us. Please allow 3 business days for your refill to be completed.          Additional Information About Your Visit        [employer] Information     [employer] gives you secure access to your electronic health record. If you see a primary care provider, you can also send messages to your care team and make appointments. If you have questions, please call your primary care clinic.  If you do not have a primary care provider, please call [phone] and they will assist you.        Care EveryWhere ID     This is your Care EveryWhere ID. This could be used by other organizations to access your [place] medical records  [id number]        Your Vitals Were     Pulse Temperature Respirations Height Last Period Pulse Oximetry    76 98.1  F (36.7  C) (Oral) 16 5' 4\" (1.626 m) (LMP Unknown) 98%    Breastfeeding? BMI (Body Mass Index)                No 22.83 kg/m2           Blood Pressure from Last 3 Encounters:   06/07/18 120/72   04/26/18 130/80   03/06/18 121/67    Weight from Last 3 Encounters:   06/07/18 133 lb (60.3 kg)   04/26/18 136 lb 1.6 oz (61.7 kg)   03/06/18 133 lb 12.8 oz (60.7 kg)              We Performed the Following     OPHTHALMOLOGY ADULT REFERRAL          Today's Medication Changes          These changes are accurate as of 6/7/18  1:57 PM.  If you have any questions, ask your nurse or doctor.               Start taking these medicines.        Dose/Directions    gentamicin 0.3 % ophthalmic solution   Commonly known as:  GARAMYCIN   Used for:  Hordeolum internum of right lower eyelid, Hordeolum externum of right lower eyelid   Started by:  Nicky Lopez MD        " Dose:  1 drop   Place 1 drop into both eyes 3 times daily for 7 days   Quantity:  2 mL   Refills:  0            Where to get your medicines      These medications were sent to [id number] - [address] AT [facility] [place]  [address], [facility] [address]     Phone:  [phone]     gentamicin 0.3 % ophthalmic solution                Primary Care Provider Office Phone # Fax #    [facility] [phone] [address]  [facility] [address]        Equal Access to Services     SARIAH AGUIRRE : Hadii aad ku hadasho Soomaali, waaxda luqadaha, qaybta kaalmada adeegyada, waxay idiin hayaan adegonzalo lacy. So [facility] [phone].    ATENCIÓN: Si habla español, tiene a rivera disposición servicios gratuitos de asistencia lingüística. [facility] [phone].    We comply with applicable federal civil rights laws and [place] laws. We do not discriminate on the basis of race, color, national origin, age, disability, sex, sexual orientation, or gender identity.            Thank you!     Thank you for choosing [facility]  for your care. Our goal is always to provide you with excellent care. Hearing back from our patients is one way we can continue to improve our services. Please take a few minutes to complete the written survey that you may receive in the mail after your visit with us. Thank you!             Your Updated Medication List - Protect others around you: Learn how to safely use, store and throw away your medicines at www.disposemymeds.org.          This list is accurate as of 6/7/18  1:57 PM.  Always use your most recent med list.                   Brand Name Dispense Instructions for use Diagnosis    acyclovir 400 MG tablet    ZOVIRAX     TK 1 T PO  BID. TK 1 T PO BID FOR 5 DAYS FOR EACH OUTBREAK        albuterol 108 (90 Base) MCG/ACT Inhaler    PROAIR HFA/PROVENTIL HFA/VENTOLIN HFA      Inhale 2 puffs into the lungs every 4 hours as needed for shortness of breath / dyspnea or wheezing        gentamicin 0.3 % ophthalmic solution    GARAMYCIN    2 mL    Place 1 drop into both eyes 3 times daily for 7 days    Hordeolum internum of right lower eyelid, Hordeolum externum of right lower eyelid       LORazepam 0.5 MG tablet    ATIVAN     TK 1 T PO Q 8 H PRA

## 2018-06-07 NOTE — PATIENT INSTRUCTIONS
Sty (or Stye)  A sty is an infection of the oil gland of the eyelid. It may develop into a small pocket of pus (an abscess). This can cause pain, redness, and swelling. In early stages, a sty is treated with antibiotic cream, eye drops, or a small towel soaked in warm water (a warm compress). More severe cases may need to be opened and drained by a healthcare provider.  Home care    Eye drops or ointment are usually prescribed to treat the infection. Use these as directed.     Artificial tears may also be used to lubricate the eye and make it more comfortable. You can buy these over the counter without a prescription. Talk with your healthcare provider before using any over-the-counter treatment for a sty.    Apply a warm, damp towel to the affected eye for at least 5 minutes, 3 to 4 times a day for a week. Warm compresses open the pores and speed the healing. But if the compresses are too hot, they may burn your eyelid.    Sometimes the sty will drain with this treatment alone. If this happens, keep using the antibiotic until all the redness and swelling are gone.    Wash your hands before and after touching the infected eyelid to avoid spreading the infection.    Don t squeeze or try to break open the sty.  Follow-up care  Follow up with your healthcare provider, or as advised.   When to seek medical advice  Call your healthcare provider right away if any of these occur:    Increase in swelling or redness around the eyelid after 48 to 72 hours    Increase in eye pain or the eyelid blisters    Increase in warmth--the eyelid feels hot    Drainage of blood or thick pus from the sty    Blister on the eyelid    Inability to open the eyelid due to swelling    Fever of 100.4 F (38 C) or above, or as directed by your provider    Vision changes    Headache or stiff neck    The sty comes back  Date Last Reviewed: 8/1/2017 2000-2017 [employer]. [address] [place] [address], [place] [address]. All rights  reserved. This information is not intended as a substitute for professional medical care. Always follow your healthcare professional's instructions.      Dr [facility],[address],Phone [phone]

## 2018-06-07 NOTE — PROGRESS NOTES
SUBJECTIVE:   Letitia Yee is a 47 year old female who presents to clinic today for the following health issues:      Eye(s) Problem      Duration: 5 days    Description:  Location: right  Pain: no   Redness: YES  Discharge: YES- resloved    Accompanying signs and symptoms: sore inside lower lid, red bump, has been getting rashes around both eyes since beginning of April.     History (Trauma, foreign body exposure,): None    Precipitating or alleviating factors (contact use): None    Therapies tried and outcome: waited it out, got better another one started next to it.    Feeling frustrated, if eye symptoms related to fatigue, related to heart   Used to faint & sudden fatigue  Now more random  They come & go  Visual migraine- like this morning   Frustrated what is wrong    Right sided headache-  Migraine headache for 15 yr  Aura looking through kaleidoscope  Ibuprofen as needed  , helps  On going Sudden fatigue, nonspecific   She is a   Able to participate in middle eastern dance classes a few times a month, no shortness of breath in those times  But other times acute sudden fatigue  Denies depression or anxiety-- except when inflicted with anxiety  She was seen by the cardiologist for further evaluation of moderate regurgitation & mitral Prolapse -  Wondering if headache & fatigue are related    PROBLEMS TO ADD ON...    Problem list and histories reviewed & adjusted, as indicated.  Additional history: as documented    Patient Active Problem List   Diagnosis     H/O mitral valve prolapse     Hordeolum externum of right lower eyelid     Migraine with aura and without status migrainosus, not intractable     No past surgical history on file.    Social History   Substance Use Topics     Smoking status: Former Smoker     Smokeless tobacco: Never Used     Alcohol use Yes      Comment: Socially     Family History   Problem Relation Age of Onset     Arthritis Mother      Depression Mother       "Genitourinary Problems Mother      Heart Defect Father      Hypertension Father      Hyperlipidemia Father      Alcoholism Sister      Depression Sister      MENTAL ILLNESS Sister      Alcoholism Brother      Depression Brother      Asthma Maternal Grandfather            Reviewed and updated as needed this visit by clinical staff  Tobacco  Allergies  Meds       Reviewed and updated as needed this visit by Provider         ROS:  Constitutional, HEENT, cardiovascular, pulmonary, gi and gu systems are negative, except as otherwise noted.    OBJECTIVE:     /72  Pulse 76  Temp 98.1  F (36.7  C) (Oral)  Resp 16  Ht 5' 4\" (1.626 m)  Wt 133 lb (60.3 kg)  LMP  (LMP Unknown)  SpO2 98%  Breastfeeding? No  BMI 22.83 kg/m2  Body mass index is 22.83 kg/(m^2).  GENERAL: healthy, alert and no distress  HENT: ear canals and TM's normal, nose and mouth without ulcers or lesions  NECK: no adenopathy, no asymmetry, masses, or scars and thyroid normal to palpation  RESP: lungs clear to auscultation - no rales, rhonchi or wheezes  CV: regular rate and rhythm, normal S1 S2, no S3 or S4, no murmur, click or rub, no peripheral edema and peripheral pulses strong  ABDOMEN: soft, nontender, no hepatosplenomegaly, no masses and bowel sounds normal  MS: no gross musculoskeletal defects noted, no edema  SKIN: no suspicious lesions or rashes  NEURO: Normal strength and tone, mentation intact and speech normal  PSYCH: mentation appears normal, affect normal/bright    Diagnostic Test Results:    ASSESSMENT/PLAN:   1. Hordeolum internum of right lower eyelid  -warm compresses as needed   -Please see patient instructions   - gentamicin (GARAMYCIN) 0.3 % ophthalmic solution; Place 1 drop into both eyes 3 times daily for 7 days  Dispense: 2 mL; Refill: 0  - OPHTHALMOLOGY ADULT REFERRAL    2. Hordeolum externum of right lower eyelid    Often times - need to have incised by ophthalmology   Monitor symptoms for now  If not improving with " warm compressed- see ophthalmology  - OPHTHALMOLOGY ADULT REFERRAL    3. Migraine with aura and without status migrainosus, not intractable  Respond to ibuprofen as needed      On going investigation for moderate regurgitation as per cardiologist  She is encouraged to make a separate follow up after completion of the cardiology work up- if fatigue persists      Nicky Lopez MD  [facility]

## 2018-07-16 ENCOUNTER — HOSPITAL ENCOUNTER (OUTPATIENT)
Dept: CARDIOLOGY | Facility: CLINIC | Age: 48
Discharge: HOME | End: 2018-07-16
Attending: INTERNAL MEDICINE | Admitting: INTERNAL MEDICINE
Payer: COMMERCIAL

## 2018-07-16 DIAGNOSIS — I34.0 MITRAL REGURGITATION, MYXOMATOUS: ICD-10-CM

## 2018-07-16 PROCEDURE — 94621 CARDIOPULM EXERCISE TESTING: CPT

## 2018-07-16 PROCEDURE — 94621 CARDIOPULM EXERCISE TESTING: CPT | Mod: 26 | Performed by: INTERNAL MEDICINE

## 2018-07-17 ENCOUNTER — HOSPITAL ENCOUNTER (OUTPATIENT)
Dept: GENERAL RADIOLOGY | Facility: CLINIC | Age: 48
End: 2018-07-17
Attending: INTERNAL MEDICINE
Payer: COMMERCIAL

## 2018-07-17 ENCOUNTER — HOSPITAL ENCOUNTER (OUTPATIENT)
Dept: CARDIOLOGY | Facility: CLINIC | Age: 48
Discharge: HOME | End: 2018-07-17
Attending: INTERNAL MEDICINE | Admitting: INTERNAL MEDICINE
Payer: COMMERCIAL

## 2018-07-17 ENCOUNTER — HOSPITAL ENCOUNTER (OUTPATIENT)
Dept: CARDIOLOGY | Facility: CLINIC | Age: 48
End: 2018-07-17
Attending: INTERNAL MEDICINE
Payer: COMMERCIAL

## 2018-07-17 VITALS — SYSTOLIC BLOOD PRESSURE: 114 MMHG | DIASTOLIC BLOOD PRESSURE: 73 MMHG

## 2018-07-17 DIAGNOSIS — I34.0 MITRAL REGURGITATION, MYXOMATOUS: ICD-10-CM

## 2018-07-17 LAB
CREAT BLD-MCNC: 0.8 MG/DL (ref 0.52–1.04)
GFR SERPL CREATININE-BSD FRML MDRD: 77 ML/MIN/1.7M2
NT-PROBNP SERPL-MCNC: 274 PG/ML (ref 0–125)

## 2018-07-17 PROCEDURE — 75561 CARDIAC MRI FOR MORPH W/DYE: CPT | Mod: 26 | Performed by: INTERNAL MEDICINE

## 2018-07-17 PROCEDURE — 36415 COLL VENOUS BLD VENIPUNCTURE: CPT | Performed by: INTERNAL MEDICINE

## 2018-07-17 PROCEDURE — A9585 GADOBUTROL INJECTION: HCPCS | Performed by: INTERNAL MEDICINE

## 2018-07-17 PROCEDURE — 25000128 H RX IP 250 OP 636: Performed by: INTERNAL MEDICINE

## 2018-07-17 PROCEDURE — 83880 ASSAY OF NATRIURETIC PEPTIDE: CPT | Performed by: INTERNAL MEDICINE

## 2018-07-17 PROCEDURE — 75565 CARD MRI VELOC FLOW MAPPING: CPT | Mod: 26 | Performed by: INTERNAL MEDICINE

## 2018-07-17 PROCEDURE — 93226 XTRNL ECG REC<48 HR SCAN A/R: CPT

## 2018-07-17 PROCEDURE — 75561 CARDIAC MRI FOR MORPH W/DYE: CPT

## 2018-07-17 PROCEDURE — 82565 ASSAY OF CREATININE: CPT

## 2018-07-17 PROCEDURE — 93227 XTRNL ECG REC<48 HR R&I: CPT | Performed by: INTERNAL MEDICINE

## 2018-07-17 PROCEDURE — 71046 X-RAY EXAM CHEST 2 VIEWS: CPT

## 2018-07-17 RX ORDER — DIAZEPAM 5 MG
5 TABLET ORAL EVERY 30 MIN PRN
Status: DISCONTINUED | OUTPATIENT
Start: 2018-07-17 | End: 2018-07-18 | Stop reason: HOSPADM

## 2018-07-17 RX ORDER — DIPHENHYDRAMINE HYDROCHLORIDE 50 MG/ML
25-50 INJECTION INTRAMUSCULAR; INTRAVENOUS
Status: DISCONTINUED | OUTPATIENT
Start: 2018-07-17 | End: 2018-07-18 | Stop reason: HOSPADM

## 2018-07-17 RX ORDER — GADOBUTROL 604.72 MG/ML
5-65 INJECTION INTRAVENOUS ONCE
Status: COMPLETED | OUTPATIENT
Start: 2018-07-17 | End: 2018-07-17

## 2018-07-17 RX ORDER — ONDANSETRON 2 MG/ML
4 INJECTION INTRAMUSCULAR; INTRAVENOUS
Status: DISCONTINUED | OUTPATIENT
Start: 2018-07-17 | End: 2018-07-18 | Stop reason: HOSPADM

## 2018-07-17 RX ORDER — METHYLPREDNISOLONE SODIUM SUCCINATE 125 MG/2ML
125 INJECTION, POWDER, LYOPHILIZED, FOR SOLUTION INTRAMUSCULAR; INTRAVENOUS
Status: DISCONTINUED | OUTPATIENT
Start: 2018-07-17 | End: 2018-07-18 | Stop reason: HOSPADM

## 2018-07-17 RX ORDER — DIPHENHYDRAMINE HCL 25 MG
25 CAPSULE ORAL
Status: DISCONTINUED | OUTPATIENT
Start: 2018-07-17 | End: 2018-07-18 | Stop reason: HOSPADM

## 2018-07-17 RX ORDER — ACYCLOVIR 200 MG/1
0-1 CAPSULE ORAL
Status: DISCONTINUED | OUTPATIENT
Start: 2018-07-17 | End: 2018-07-18 | Stop reason: HOSPADM

## 2018-07-17 RX ADMIN — GADOBUTROL 9 ML: 604.72 INJECTION INTRAVENOUS at 09:45

## 2018-07-18 NOTE — PROGRESS NOTES
Cardiac Core protocol with 3 ch stack and flows (LVOT, ST J, PA, MV)  Contrast administered per weight based protocol.  Per Dr Christopher

## 2018-07-19 LAB — INTERPRETATION MONITOR -MUSE: NORMAL

## 2018-08-06 ENCOUNTER — TRANSFERRED RECORDS (OUTPATIENT)
Dept: HEALTH INFORMATION MANAGEMENT | Facility: CLINIC | Age: 48
End: 2018-08-06

## 2018-08-07 ENCOUNTER — CARE COORDINATION (OUTPATIENT)
Dept: CARDIOLOGY | Facility: CLINIC | Age: 48
End: 2018-08-07

## 2018-08-07 NOTE — PROGRESS NOTES
Message form pulmonary stating she is concerned she did not get a call tests she had completed on 7/16 and 7/17 including Cardiac MRI,cardio-pulmonary stress tests and Holter and the tests were also not released to her through my chart.    Per Results notes Dr Solis advised she would review tests at office visit and asked nursing not call patient. Original follow up 7/30/2018 was rescheduled to 8/13/2018 due to patient out of town per scheduling note.   See results notes including:  Cardiac MRI noted, discussed and reviewed with Dr. Solis. Dr. Solis will review with pt at her visit on 7/30/18, and does not want nursing calling pt with results prior to appt. Dr. Solis will review everything with her prior to visit. HECTOR Gutierrez 3:09 PM 07/17/18   Notes Recorded by Kelli Solis MD on 7/19/2018 at 2:52 PM Normal Holter. Will discuss with patient at her scheduled outpatient clinic visit. Dr. Solis    Called back to patient and left message to call back to review.  Lyla Kendall RN 08/07/18 11:25 AM    Call back from patient - she states she would like test results - reviewed basic results of holter, Cardiac MRI and Cardiopulmonary stress test and informed complexities of results and interpretation will be address when DR Solis returns next week at patient visit 8/13/2018. Patient states our policy on paperwork gave her the impression she would hear her results within 7 days of testing.  Patient agrees to further review with DR Solis at visit and states questions answered at this time.   Lyla Kendall RN 08/07/18 1:24 PM

## 2018-08-13 ENCOUNTER — OFFICE VISIT (OUTPATIENT)
Dept: CARDIOLOGY | Facility: CLINIC | Age: 48
End: 2018-08-13
Payer: COMMERCIAL

## 2018-08-13 VITALS
BODY MASS INDEX: 23.2 KG/M2 | SYSTOLIC BLOOD PRESSURE: 117 MMHG | DIASTOLIC BLOOD PRESSURE: 74 MMHG | HEIGHT: 64 IN | OXYGEN SATURATION: 98 % | WEIGHT: 135.9 LBS | HEART RATE: 67 BPM

## 2018-08-13 DIAGNOSIS — I34.0 NON-RHEUMATIC MITRAL REGURGITATION: Primary | ICD-10-CM

## 2018-08-13 DIAGNOSIS — R07.89 CHEST TIGHTNESS: ICD-10-CM

## 2018-08-13 DIAGNOSIS — R06.00 DYSPNEA, UNSPECIFIED TYPE: ICD-10-CM

## 2018-08-13 DIAGNOSIS — Z82.49 FAMILY HISTORY OF ISCHEMIC HEART DISEASE: ICD-10-CM

## 2018-08-13 PROCEDURE — 99215 OFFICE O/P EST HI 40 MIN: CPT | Performed by: INTERNAL MEDICINE

## 2018-08-13 NOTE — PROGRESS NOTES
"Service Date: 08/13/2018      PRIMARY CARE AND REFERRING PROVIDER:  Dr. Shayan Warren       REASON FOR VISIT:  Mitral regurgitation secondary to mitral valve prolapse.      HISTORY OF PRESENT ILLNESS:    Ms. Yee is known to me from her initial visit on 04/26/2018.  She is a very pleasant 47-year-old  lady who was unaccompanied today.  She is  with 1 child and apart from known mitral valve prolapse since she was a child, does not have any other significant medical history.  She is a remote former smoker who quit several years ago.      Initially, I had seen her for evaluation of her mitral regurgitation.  An echocardiogram was done a few months ago for nonspecific fatigue and a louder murmur on auscultation and had shown at least moderate mitral regurgitation.  Based on review of her echo images and her physical exam, I had thought that there was insufficient data to ascertain the true severity of her mitral regurgitation.  Primarily, although most parameters pointed towards moderate mitral regurgitation, her left ventricle was mildly dilated with an end-systolic diameter of 3.5 cm and borderline reduced LVEF (was not accurately quantified on echo).  Therefore, I had requested additional testing which she has completed.      However, for the last 2 weeks, she has new symptoms of chest tightness and dyspnea on exertion.  She states that she feels \"cramping in my chest\" that occurs with accustomed activities such as swimming, walking a flight of stairs, walking less than a mile but also occurs when she is resting.  The duration is variable but typically improves with rest.  She also gets exertional dyspnea.  Both of these symptoms were recapitulated during her cardiopulmonary stress test (details below).  She does not have other symptoms of heart failure such as orthopnea, PND or lower extremity edema.      Labs:  NT-proBNP mildly elevated at 274.  Creatinine 0.8.      Cardiac MRI suggests " moderate mitral regurgitation with mildly dilated left ventricle and low-normal systolic function with an LVEF of 56% and no regionals.  The right ventricular size is normal with low-normal systolic function, RVEF 52%.  Although the report suggests normal atrial size, the left atrium is probably moderately dilated.  The mitral valve leaflets appear myxomatous with bilateral prolapse and there is an eccentric posterolaterally directed jet of mitral regurgitation with moderate severity (regurgitant volume 33 mL, regurgitant fraction 31%).  No evidence of previous myocardial infarction.  I personally reviewed the images.      24-hour Holter - sinus rhythm throughout.  Specifically, during the patient-reported symptoms of chest cramping, fatigue and dizziness, she was in normal sinus rhythm with heart rates of 55-69 BPM.  There were no arrhythmias or significant ectopy.      Cardiopulmonary stress test.  The patient exercised for a MET level of 8.7 on the Phillip protocol.  Her peak VO2 was 30.6 mL/kg/minute.  She had an appropriate ventilatory response to exercise.  She was in sinus rhythm throughout.  The stress ECG was reported as nondiagnostic for ischemia (she did have chest pain and dyspnea).  She had a slightly attenuated heart rate response with a resting rate of 69 BPM and peak of 147 (predicted 174, therefore 84% of predicted), normal blood pressure response with a resting of 124/80 and a peak of 154/80.  The oxygen pulse was appropriate (105% of predicted).  The total assessment was she had an average class I functional capacity with a possible cardiac limitation to exercise primarily due to the mildly attenuated heart rate response but overall, there was no significant evidence of cardiopulmonary disease.  Her oxygen sats remained at 98% throughout.      CURRENT MEDICATIONS:   1.  Acyclovir 400 mg as needed.   2.  Albuterol inhaler.   3.  Lorazepam (Ativan) as needed.      ALLERGIES:  No known allergies.       FAMILY HISTORY:  Her father had his first myocardial infarction in his early 60s and her paternal grandfather  from a myocardial infarction in his 50s.  History of depression in mother.  A sister also has depression, mental illness and alcoholism.  Brother has depression and alcoholism.      SOCIAL HISTORY:  The patient is .  She is a former smoker of a pack a day for approximately 3-5 years and quit in .  Social alcohol intake.  Tries to remain active.      PHYSICAL EXAMINATION:     VITAL SIGNS:  Blood pressure 107/74, pulse 67 per minute and regular, height 1.626 meters (5 feet 4 inches), weight 61.6 kg (135 pounds), respiratory rate 16 per minute, sats 98% on room air, BMI 23.3 kg/m2.   CONSTITUTIONAL:  Slim, comfortable at rest.   EYES:  No pallor.   ENT:  No cyanosis.   CARDIOVASCULAR:  Apical impulse is undisplaced.  No thrill or parasternal heave.  Normal first heart sound.  Midsystolic click.  Normal second heart sound.  She has a well-audible 3/6 mid/late systolic murmur that is impressively radiates all the way across her axilla to the back and is heard even over her right scapular area.  There is no S3 or S4.   NECK:  Jugular venous pressure is normal.  Carotid pulse normal.   LUNGS:  Clear to auscultation.   ABDOMEN:  Soft and nontender.  No hepatosplenomegaly or free fluid.  Active bowel sounds.   EXTREMITIES:  No edema.   NEUROPSYCHIATRIC:  Alert, oriented x3.      DIAGNOSES:     1.  At least moderate mitral regurgitation secondary to bileaflet mitral valve prolapse.   2.  Dyspnea on exertion.   3.  Chest tightness.   4.  Family history of ischemic heart disease.   5.  Remote tobacco user.      ASSESSMENT AND PLAN:   1.  Chest pain and dyspnea.  These symptoms are relatively new over the last 2-3 weeks.  I do not know what to make of them.  Fortunately, she has had these symptoms both during the cardiopulmonary stress test which did not show any significant cardiopulmonary limitation  apart from a mildly blunted heart rate response and her oxygen saturations remained normal.  The symptoms were also shown during her Holter monitor during which she had remained in normal sinus rhythm without even sinus tachycardia.  Although they occur primarily with exertion, sometimes they occur at rest.  The patient states that she has a prior history of seasonal allergies dating back to several years ago with an interval hiatus where she was asymptomatic for several years.  Although her stress test/ECG was nondiagnostic, patient has a family history of premature myocardial infarction and CAD in her father and paternal grandfather and was a remote tobacco user.  It would be prudent to get a CT coronary angiogram to rule out CAD and a V/Q scan to rule out pulmonary embolus.  I have also suggested that she see her primary care provider to see if a trial of medication for seasonal allergies would help.  If these are negative, I will refer her to a pulmonologist.  Specifically, even severe mitral regurgitation is unlikely to cause these specific symptoms.     2.  Mitral regurgitation.  All her testing thus far has quantitated the mitral regurgitation as moderate.  This is within the caveat of the jet eccentricity.  However, I am not able to explain why her left ventricle is mildly dilated and her LVEF is mildly reduced at 56%.  If her above workup is unrevealing, we might do an interval echocardiogram in 3-6 months.  The other option would be to send her for a second opinion to the Valve Clinic at [facility].  Alternatively, if her CT angiogram requires invasive angiogram, we could do an LV gram to quantitate the mitral regurgitation at the same time.     3.  Follow up in my clinic after completion of above.     ADDENDUM: Patient subsequently have a reassuring VQ scan (low probability of pulmonary embolus) and CT coronary angiogram (10 calcium score 0 and the coronary arteries are widely patent without  significant plaque).  She has also seen a pulmonologist and her workup has been unremarkable.  For her mitral regurgitation, we will follow-up in 6 months with a repeat echocardiogram and labs.  My staff discussed results with the patient over the phone.     It was my pleasure to visit with this patient.  I spent 60 minutes with her, greater than 50% of the time was spent in counseling and coordination of care.        cc:   Shayan Warren MD    [address], [address]   [address] BETINA PAN MD             D: 2018   T: 2018   MT: HELLEN      Name:     MARKO SALDAÑA   MRN:      [id number]        Account:      [id number]   :      1970           Service Date: 2018      Document: [id number]

## 2018-08-13 NOTE — PATIENT INSTRUCTIONS
1. VQ scan to be done today.    2. CT coronary angiogram.    3.  Referral to pulmonologist at the [facility] for evaluation of chest tightness and dyspnea on exertion - after completion of the above. [facility] [place] [facility] [phone]       4. See your primary care provider for a trial of medications for possible seasonal allergies.    5.  Follow up in my clinic after completion of above.    If you have any questions or concerns, please call my nurse Lyla Cisneros at [phone].

## 2018-08-13 NOTE — NURSING NOTE
Reviewed the AVS (After Visit Summary) with patient in detail following their office visit with Dr. Solis. The patient was educated on the outlined plan of care including ordered tests, labs, medication changes, and follow up. Patient verbalized understanding of the information and agrees to call with any questions or concerns.     Return appointment: Patient was given instructions on when and how to schedule their next office visit with the  clinic.     Lyla MAGAÑA, MA, RN  RN Care Coordinator  [facility]  [phone]

## 2018-08-13 NOTE — MR AVS SNAPSHOT
After Visit Summary   8/13/2018    Letitia Yee    MRN: [id number]           Patient Information     Date Of Birth          1970        Visit Information        Provider Department      8/13/2018 9:45 AM Kelli Solis MD [facility]        Today's Diagnoses     Non-rheumatic mitral regurgitation    -  1    Dyspnea, unspecified type        Chest tightness        Family history of ischemic heart disease          Care Instructions    1. VQ scan to be done today.    2. CT coronary angiogram.    3.  Referral to pulmonologist at the [facility] for evaluation of chest tightness and dyspnea on exertion - after completion of the above. [facility] and Pending [facility] [phone]       4. See your primary care provider for a trial of medications for possible seasonal allergies.    5.  Follow up in my clinic after completion of above.    If you have any questions or concerns, please call my nurse Lyla Cisneros at [phone].            Follow-ups after your visit        Additional Services     Follow-Up with Pulmonary Hypertension Clinic           PULMONARY MEDICINE REFERRAL       Your provider has referred you to: [facility] and Pending [facility] [phone]   [email or web address]    Please be aware that coverage of these services is subject to the terms and limitations of your health insurance plan.  Call member services at your health plan with any benefit or coverage questions.      Please bring the following with you to your appointment:    (1) Any X-Rays, CTs or MRIs which have been performed.  Contact the facility where they were done to arrange for  prior to your scheduled appointment.    (2) List of current medications   (3) This referral request   (4) Any documents/labs given to you for this referral                  Future  "tests that were ordered for you today     Open Future Orders        Priority Expected Expires Ordered    CT Angiogram coronary artery Routine 8/20/2018 8/13/2019 8/13/2018    Follow-Up with Pulmonary Hypertension Clinic Routine 9/12/2018 8/13/2019 8/13/2018    NM Lung Scan Ventilation and Perfusion Routine 8/20/2018 8/13/2019 8/13/2018    XR Chest 2 Views Routine 8/20/2018 8/13/2019 8/13/2018    PULMONARY MEDICINE REFERRAL Routine 9/13/2018 8/13/2019 8/13/2018            Who to contact     If you have questions or need follow up information about today's clinic visit or your schedule please contact [facility] directly at [phone].  Normal or non-critical lab and imaging results will be communicated to you by [employer], letter or phone within 4 business days after the clinic has received the results. If you do not hear from us within 7 days, please contact the clinic through [employer] or phone. If you have a critical or abnormal lab result, we will notify you by phone as soon as possible.  Submit refill requests through [employer] or call your pharmacy and they will forward the refill request to us. Please allow 3 business days for your refill to be completed.          Additional Information About Your Visit        [employer] Information     [employer] gives you secure access to your electronic health record. If you see a primary care provider, you can also send messages to your care team and make appointments. If you have questions, please call your primary care clinic.  If you do not have a primary care provider, please call [phone] and they will assist you.        Care EveryWhere ID     This is your Care EveryWhere ID. This could be used by other organizations to access your [facility] medical records  [id number]        Your Vitals Were     Pulse Height Pulse Oximetry BMI (Body Mass Index)          67 1.626 m (5' 4\") 98% 23.33 kg/m2         Blood Pressure from Last 3 Encounters: "   08/13/18 117/74   07/17/18 114/73   06/07/18 120/72    Weight from Last 3 Encounters:   08/13/18 61.6 kg (135 lb 14.4 oz)   06/07/18 60.3 kg (133 lb)   04/26/18 61.7 kg (136 lb 1.6 oz)               Primary Care Provider Office Phone # Fax #    [facility] [phone] [address]  [facility] [place] [address]        Equal Access to Services     [facility] : Hadii aad ku hadasho Soomaali, waaxda luqadaha, qaybta kaalmada adeegyada, waxay idiin hayaan adeeg johnathan phillips . So [facility] [phone].    ATENCIÓN: Si habla español, tiene a rivera disposición servicios gratuitos de asistencia lingüística. [facility] [phone].    We comply with applicable federal civil rights laws and [place] laws. We do not discriminate on the basis of race, color, national origin, age, disability, sex, sexual orientation, or gender identity.            Thank you!     Thank you for choosing [facility]  for your care. Our goal is always to provide you with excellent care. Hearing back from our patients is one way we can continue to improve our services. Please take a few minutes to complete the written survey that you may receive in the mail after your visit with us. Thank you!             Your Updated Medication List - Protect others around you: Learn how to safely use, store and throw away your medicines at www.disposemymeds.org.          This list is accurate as of 8/13/18 11:00 AM.  Always use your most recent med list.                   Brand Name Dispense Instructions for use Diagnosis    acyclovir 400 MG tablet    ZOVIRAX     TK 1 T PO  BID. TK 1 T PO BID FOR 5 DAYS FOR EACH OUTBREAK        albuterol 108 (90 Base) MCG/ACT inhaler    PROAIR HFA/PROVENTIL HFA/VENTOLIN HFA     Inhale 2 puffs into the lungs every 4 hours as needed for shortness of breath / dyspnea or wheezing        LORazepam 0.5 MG tablet    ATIVAN     TK 1 T PO Q 8 H PRA

## 2018-08-13 NOTE — LETTER
"8/13/2018      [facility]  [address]  [facility] [address]      RE: Letitia Yee       Dear Colleague,    I had the pleasure of seeing Letitia Yee in the [facility].    Service Date: 08/13/2018      PRIMARY CARE AND REFERRING PROVIDER:  Dr. Shayan Warren       REASON FOR VISIT:  Mitral regurgitation secondary to mitral valve prolapse.      HISTORY OF PRESENT ILLNESS:  Ms. Yee is known to me from her initial visit on 04/26/2018.  She is a very pleasant 47-year-old  lady who was unaccompanied today.  She is  with 1 child and apart from known mitral valve prolapse since she was a child, does not have any other significant medical history.  She is a remote former smoker who quit several years ago.      Initially, I had seen her for evaluation of her mitral regurgitation.  An echocardiogram was done a few months ago for nonspecific fatigue and a louder murmur on auscultation and had shown at least moderate mitral regurgitation.  Based on review of her echo images and her physical exam, I had thought that there was insufficient data to ascertain the true severity of her mitral regurgitation.  Primarily, although most parameters pointed towards moderate mitral regurgitation, her left ventricle was mildly dilated with an end-systolic diameter of 3.5 cm and borderline reduced LVEF (was not accurately quantified on echo).  Therefore, I had requested additional testing which she has completed.      However, for the last 2 weeks, she has new symptoms of chest tightness and dyspnea on exertion.  She states that she feels \"cramping in my chest\" that occurs with accustomed activities such as swimming, walking a flight of stairs, walking less than a mile but also occurs when she is resting.  The duration is variable but typically improves with rest.  She also gets exertional dyspnea.  Both of these symptoms were recapitulated during " her cardiopulmonary stress test (details below).  She does not have other symptoms of heart failure such as orthopnea, PND or lower extremity edema.      LABORATORIES:  NT-proBNP mildly elevated at 274.  Creatinine 0.8.      RADIOLOGIC STUDIES:  Cardiac MRI suggests moderate mitral regurgitation with mildly dilated left ventricle and low-normal systolic function with an LVEF of 56% and no regionals.  The right ventricular size is normal with low-normal systolic function, RVEF 52%.  Although the report suggests normal atrial size, the left atrium is probably moderately dilated.  The mitral valve leaflets appear myxomatous with bilateral prolapse and there is an eccentric posterolaterally directed jet of mitral regurgitation with moderate severity (regurgitant volume 33 mL, regurgitant fraction 31%).  No evidence of previous myocardial infarction.  I personally reviewed the images.      A 24-hour Holter, sinus rhythm throughout.  Specifically, during the patient-reported symptoms of chest cramping, fatigue and dizziness, she was in normal sinus rhythm with heart rates of 55-69 BPM.  There were no arrhythmias or significant ectopy.      Cardiopulmonary stress test.  The patient exercised for a MET level of 8.7 on the Phillip protocol.  Her peak VO2 was 30.6 mL/kg/minute.  She had an appropriate ventilatory response to exercise.  She was in sinus rhythm throughout.  The stress ECG was reported as nondiagnostic for ischemia (she did have chest pain and dyspnea).  She had a slightly attenuated heart rate response with a resting rate of 69 BPM and peak of 147 (predicted 174, therefore 84% of predicted), normal blood pressure response with a resting of 124/80 and a peak of 154/80.  The oxygen pulse was appropriate (105% of predicted).  The total assessment was she had an average of class I functional capacity with a possible cardiac limitation to exercise primarily due to the mildly attenuated heart rate response but overall,  there was no significant evidence of cardiopulmonary disease.  Her oxygen sats remained at 98% throughout.      CURRENT MEDICATIONS:   1.  Acyclovir 400 mg as needed.   2.  Albuterol inhaler.   3.  Lorazepam (Ativan) as needed.      ALLERGIES:  No known allergies.      FAMILY HISTORY:  Her father had his first myocardial infarction in his early 60s and her paternal grandfather  from a myocardial infarction in his 50s.  History of depression in mother.  A sister also has depression, mental illness and alcoholism.  Brother has depression and alcoholism.      SOCIAL HISTORY:  The patient is .  She is a former smoker of a pack a day for approximately 3-5 years and quit in .  Social alcohol intake.  Tries to remain active.      PHYSICAL EXAMINATION:     VITAL SIGNS:  Blood pressure 107/74, pulse 67 per minute and regular, height 1.626 meters (5 feet 4 inches), weight 61.6 kg (135 pounds), respiratory rate 16 per minute, sats 98% on room air, BMI 23.3 kg/m2.   CONSTITUTIONAL:  Slim, comfortable at rest.   EYES:  No pallor.   ENT:  No cyanosis.   CARDIOVASCULAR:  Apical impulse is undisplaced.  No thrill or parasternal heave.  Normal first heart sound.  Midsystolic click.  Normal second heart sound.  She has a well-audible 3/6 mid/late systolic murmur that is impressively radiates all the way across her axilla to the back and is heard even over her right scapular area.  There is no S3 or S4.   NECK:  Jugular venous pressure is normal.  Carotid pulse normal.   LUNGS:  Clear to auscultation.   ABDOMEN:  Soft and nontender.  No hepatosplenomegaly or free fluid.  Active bowel sounds.   EXTREMITIES:  No edema.   NEUROPSYCHIATRIC:  Alert, oriented x3.      DIAGNOSES:     1.  At least moderate mitral regurgitation secondary to bileaflet mitral valve prolapse.   2.  Dyspnea on exertion.   3.  Chest tightness.   4.  Family history of ischemic heart disease.   5.  Remote tobacco user.      ASSESSMENT AND PLAN:   1.   Chest pain and dyspnea.  These symptoms are relatively new over the last 2-3 weeks.  I do not know what to make of them.  Fortunately, she has had these symptoms both during the cardiopulmonary stress test which did not show any significant cardiopulmonary limitation apart from a mildly blunted heart rate response and her sats remained normal.  The symptoms were also shown during her Holter monitor during which she had remained in normal sinus rhythm without even sinus tachycardia.  Although they occur primarily with exertion, sometimes they occur at rest.  The patient states that she has a prior history of seasonal allergies dating back to several years ago with an interval hiatus where she has not had symptoms for several years.  Although her stress test/ECG was nondiagnostic, patient has a family history of premature myocardial infarction and CAD in her father and paternal grandfather and was a remote tobacco user.  It would be prudent to get a CT coronary angiogram to rule out CAD and a V/Q scan to rule out pulmonary embolus.  I have also suggested that she see her primary care provider to see if a trial of medication for seasonal allergies would help.  If these are negative, I will refer her to a pulmonologist.  Specifically, even severe mitral regurgitation is unlikely to cause these specific symptoms.   2.  Mitral regurgitation.  All her testing thus far has quantitated the mitral regurgitation as moderate.  This is within the caveat of the jet eccentricity.  However, I am not able to explain why her left ventricle is mildly dilated and her LVEF is mildly reduced at 56%.  If her above workup is unrevealing, we might do an interval echocardiogram in 3-6 months.  The other option would be to send her for a second opinion to the Valve Clinic at [facility].  Alternatively, if her CT angiogram requires invasive angiogram, we could do an LV gram to quantitate the mitral regurgitation at the same time.   3.   Follow up in my clinic after completion of above.      It was my pleasure to visit with this patient.  I spent 60 minutes with her, greater than 50% of the time was spent in counseling and coordination of care.        cc:   Shayan Warren MD    [address], [address]   [place] [address]         KELLI SOLIS MD             D: 2018   T: 2018   MT: DW      Name:     MARKO SALDAÑA   MRN:      [id number]        Account:      [id number]   :      1970           Service Date: 2018      Document: [id number]         Outpatient Encounter Prescriptions as of 2018   Medication Sig Dispense Refill     acyclovir (ZOVIRAX) 400 MG tablet TK 1 T PO  BID. TK 1 T PO BID FOR 5 DAYS FOR EACH OUTBREAK  1     albuterol (PROAIR HFA/PROVENTIL HFA/VENTOLIN HFA) 108 (90 Base) MCG/ACT Inhaler Inhale 2 puffs into the lungs every 4 hours as needed for shortness of breath / dyspnea or wheezing       LORazepam (ATIVAN) 0.5 MG tablet TK 1 T PO Q 8 H PRA  0     No facility-administered encounter medications on file as of 2018.        Again, thank you for allowing me to participate in the care of your patient.      Sincerely,    Kelli Solis MD     [facility]

## 2018-08-13 NOTE — LETTER
8/13/2018    [facility]  [address]  [facility] [address]    RE: Letitia Yee       Dear Colleague,    I had the [facility] of seeing [facility] in [facility].    Clinic visit note dictated. Dictation reference number - [id number]      Thank you for allowing me to participate in the care of your patient.      Sincerely,     Kelli Solis MD     [facility]    cc:   Kelli Solis MD  [address] [place] [id number]

## 2018-08-14 ENCOUNTER — HOSPITAL ENCOUNTER (OUTPATIENT)
Dept: NUCLEAR MEDICINE | Facility: CLINIC | Age: 48
Setting detail: NUCLEAR MEDICINE
Discharge: HOME | End: 2018-08-14
Attending: INTERNAL MEDICINE | Admitting: INTERNAL MEDICINE
Payer: COMMERCIAL

## 2018-08-14 ENCOUNTER — HOSPITAL ENCOUNTER (OUTPATIENT)
Dept: GENERAL RADIOLOGY | Facility: CLINIC | Age: 48
Discharge: HOME | End: 2018-08-14
Attending: INTERNAL MEDICINE | Admitting: INTERNAL MEDICINE
Payer: COMMERCIAL

## 2018-08-14 DIAGNOSIS — Z82.49 FAMILY HISTORY OF ISCHEMIC HEART DISEASE: ICD-10-CM

## 2018-08-14 DIAGNOSIS — R06.00 DYSPNEA, UNSPECIFIED TYPE: ICD-10-CM

## 2018-08-14 DIAGNOSIS — R06.00 DYSPNEA: Primary | ICD-10-CM

## 2018-08-14 DIAGNOSIS — R07.89 CHEST TIGHTNESS: ICD-10-CM

## 2018-08-14 DIAGNOSIS — I34.0 NON-RHEUMATIC MITRAL REGURGITATION: ICD-10-CM

## 2018-08-14 PROCEDURE — A9540 TC99M MAA: HCPCS | Performed by: INTERNAL MEDICINE

## 2018-08-14 PROCEDURE — 71046 X-RAY EXAM CHEST 2 VIEWS: CPT

## 2018-08-14 PROCEDURE — 34300033 ZZH RX 343: Performed by: INTERNAL MEDICINE

## 2018-08-14 PROCEDURE — 78580 LUNG PERFUSION IMAGING: CPT

## 2018-08-14 RX ADMIN — Medication 3.4 MILLICURIE: at 13:09

## 2018-08-15 ENCOUNTER — CARE COORDINATION (OUTPATIENT)
Dept: CARDIOLOGY | Facility: CLINIC | Age: 48
End: 2018-08-15

## 2018-08-15 NOTE — TELEPHONE ENCOUNTER
FUTURE VISIT INFORMATION      FUTURE VISIT INFORMATION:    Date:  9.5.18     Time: 3:35 PM    Location: [facility]  REFERRAL INFORMATION:    Referring provider:  Dr. Solis    Referring providers clinic:  Cardiology    Reason for visit/diagnosis  Dyspnea    RECORDS REQUESTED FROM:       Clinic name Comments Records Status Imaging Status   Cardiology Referral placed 8.13.18 EPIC PACS                                   RECORDS STATUS

## 2018-08-15 NOTE — LETTER
September 20, 2018       TO: Letitia Yee   [address] [place] [address]       Dear Ms. Yee,    The results of your recent CT angiogram and VQ scan.    Results for orders placed or performed during the hospital encounter of 08/14/18   NM Lung Scan Perfusion Particulate    Narrative    NUCLEAR MEDICINE LUNG SCAN PERFUSION PARTICULATE  8/14/2018 1:25 PM    HISTORY:  Pulmonary hypertension. Non-rheumatic mitral regurgitation.  Dyspnea, unspecified type. Chest tightness. Family history of ischemic  heart disease.    COMPARISON: Chest x-ray 8/14/2018    TECHNIQUE: Tc-99m MAA injected intravenously for evaluation of  pulmonary perfusion.    DOSE: 3.4 mCi Tc99m MAA @ 1300, L AC.     FINDINGS: There are no wedge shaped perfusion defects to suggest  pulmonary emboli.      Impression    IMPRESSION: Negative perfusion scan.     JEOVANY REYNA MD                   Procedure: CTA ANGIOGRAM CORONARY ARTERY   Examination Date: 8/29/2018 10:14 AM      Indication:  Chest pain. Dyspnea on exertion. Mitral regurgitation.  Family history of coronary artery disease..      Clinical Information: chest pain and family history; history of PH;  Non-rheumatic mitral regurgitation; Dyspnea, unspecified type; Chest  tightness; Family history of ischemic heart disease   Ordering Physician: Cora Solis      Overall quality of the study: Good.      PROCEDURE:The patient was positioned in the scanner gantry and an IV  was started using an 18 gauge IV in the right antecubital fossa.   Utilizing 110 cc  Isovue 370, wasted 0 cc, multi-slice computed  tomography was performed with a Siemens Dual Source Flash scanner  without incident. Beta-blockers were required to optimize heart rate,  patient was given Metoprolol 0 mg Oral, Metoprolol 0 mg  IV. The  patient was given pre-medication of sublingual Nitrostat 0.4 mg prior  to scanning. CTA was performed in the sequential mode at a heart rate  of 56 bpm with 100 kVp.  Images were reconstructed and analyzed on a  [employer] workstation. Scan protocol was optimized to minimize  radiation exposure. The total radiation exposure was calculated to be  178 DLP, and 2.49 mSv.        FINDINGS:     CORONARY CALCIUM SCORE: The total Agatston calcium score is 0, Left  main: 0, left anterior descendin,  circumflex: 0, right coronary  artery: 0.      CORONARY ANGIOGRAPHY : At most, trivial coronary artery disease.                                                         Mild dilatation  of the aortic root.     CORONARY ANGIOGRAPHY     DOMINANCE: Right dominant system.      LEFT MAIN:      The left main arises from the left coronary cusp.      The left main is widely patent without any stenosis or plaque.         LEFT ANTERIOR DESCENDING ARTERY:      The proximal left anterior descending artery has trivial nonstenotic  noncalcified plaque present. The mid left anterior descending artery  is widely patent without stenosis or plaque. The distal left anterior  descending artery is a small caliber vessel which is at most trivially  stenosed with noncalcified plaque. The first diagonal artery is a  smaller caliber vessel which is trivially stenosed with noncalcified  plaque. The second diagonal artery is a significantly larger vessel  which does not appear to have any significant stenosis or plaque.        LEFT CIRCUMFLEX ARTERY:      Proximal, mid and distal circumflex appears to be widely patent  without stenosis or plaque. The first obtuse marginal artery is a  small caliber vessel which is patent with no obvious flow obstructing  lesion. . The second obtuse marginal artery also is a smaller caliber  vessel which appears to be patent with no obvious flow obstructing  lesion.. Third obtuse marginal artery is a smaller caliber vessel  which also appears to be patent with no obvious flow obstructing  lesion. The ramus intermedius is a smaller caliber vessel which is  patent with no obvious flow  obstructing lesion.        RIGHT CORONARY ARTERY:     The very proximal portion of the right coronary artery has trivially  stenotic noncalcified plaque present. The remainder of the proximal  right coronary artery, mid right coronary artery and distal right  coronary artery appears to be widely patent without stenosis or  plaque. The posterior descending artery is a tiny vessel which is  patent. Posterolateral artery appears to have at most trivial  nonstenotic noncalcified plaque present.     ADDITIONAL FINDINGS:      The aortic valve is trileaflet. The aortic root is borderline dilated  measuring 3.83 x 3.27 cm. The visualized ascending aorta is normal  dimension measuring 2.89 x 2.97 cm.      There are 2 right and one left pulmonary vein draining into the left  atrium. The left atrial appendage is large and appears to be free of  thrombus.      There is no left ventricular mass or thrombus.      Normal pericardial thickness. There is no pericardial effusion.        Please review Radiology report for incidental noncardiac findings that  will follow separately.            STEPHEN LEIVA MD           RADIOLOGIST CONSULT FOR CARDIOLOGY August 29, 2018 at 1014 hours     HISTORY: 47-year-old with nonrheumatic mitral valve regurgitation.  History of dyspnea and chest tightness. Patient has a family history  of ischemic heart disease.     COMPARISON: None.     TECHNIQUE: Axial and coronal CTA images obtained through the heart  after the uneventful administration of Isovue-370 intravenous contrast  given for a total of 110 mL.     FINDINGS: Please note this report will focus on soft tissue findings.  Please see separate report for all cardiac and vascular findings.     The visible solid organs in the upper abdomen are unremarkable. No  acute osseous abnormality. Minimal bibasilar atelectasis. Nodular  opacity in the major fissure of the right lung measures up to 0.8 cm.  This may be a lymph node within the fissure,  though warrants  follow-up. Recommend follow-up CT exam in in three to six months given  size of 8 mm. Minimal bibasilar atelectasis.     JESUS CHU MD     Your doctor cancelled your appointment 8/31 as it was noted needed.  Please scheduled a visit with your Primary MD to follow radiology results on lung nodule right side.    Sincerely,    [facility] [place] [facility]

## 2018-08-15 NOTE — PROGRESS NOTES
Kelli Solis MD McMahon, Bullis Mary, RN                   Please let patient know that her lung scan was negative for blood clots. Thanks.     Dr. Solis        return visit  Received: Yesterday       Kelli Solis MD McMahon, Bullis Mary, RN                   Lyla     Please schedule return visit with me after her CTA. Preferably same week.     Thanks   SJ                Called to patient with results of VQ scan and CXR and Dr Solis comments on same.  She states understanding.   Also discussed DR Solis plan for return visit with her after CTa for 60 minute slot same week of CTa - Dr Solis has DOD opennings on day of CTa 8/29/2018 and on Friday 8/31/2018 - patient scheduled for her preference on Friday. Patient states she would like to have Dr Solis confirm appointment is still needed after she reviews CTa as per patient reports it was her understanding at office visit discussion with Dr Solis she would see patient back if abnormal CTa but defer to later date post pulmonary visit if CTa was normal. Patient has visit with [facility] on 9/5/2018.  Patient will be calling on Thursday 8/30/2018 to see if appointment 8/31 needed. Messaged to nurse team to address with Dr Solis on 8/30/2018.  Lyla Kendall RN 08/15/18 1:35 PM

## 2018-08-22 ENCOUNTER — MYC MEDICAL ADVICE (OUTPATIENT)
Dept: FAMILY MEDICINE | Facility: CLINIC | Age: 48
End: 2018-08-22

## 2018-08-22 DIAGNOSIS — J45.20 MILD INTERMITTENT REACTIVE AIRWAY DISEASE WITHOUT COMPLICATION: Primary | ICD-10-CM

## 2018-08-23 RX ORDER — ALBUTEROL SULFATE 90 UG/1
2 AEROSOL, METERED RESPIRATORY (INHALATION) EVERY 4 HOURS PRN
Qty: 1 INHALER | Refills: 5 | Status: SHIPPED | OUTPATIENT
Start: 2018-08-23

## 2018-08-23 NOTE — TELEPHONE ENCOUNTER
FS,   Please see below [employer] message and advise.  Pended Rx request - listed as historical  Thanks,  Olamide DAWSON RN

## 2018-08-24 ASSESSMENT — ASTHMA QUESTIONNAIRES
ACT_TOTALSCORE: 21
ACT_TOTALSCORE: 21

## 2018-08-29 ENCOUNTER — HOSPITAL ENCOUNTER (OUTPATIENT)
Dept: CARDIOLOGY | Facility: CLINIC | Age: 48
Discharge: HOME | End: 2018-08-29
Attending: INTERNAL MEDICINE | Admitting: INTERNAL MEDICINE
Payer: COMMERCIAL

## 2018-08-29 VITALS — SYSTOLIC BLOOD PRESSURE: 104 MMHG | DIASTOLIC BLOOD PRESSURE: 65 MMHG | HEART RATE: 58 BPM

## 2018-08-29 DIAGNOSIS — R07.89 CHEST TIGHTNESS: ICD-10-CM

## 2018-08-29 DIAGNOSIS — R06.00 DYSPNEA, UNSPECIFIED TYPE: ICD-10-CM

## 2018-08-29 DIAGNOSIS — Z82.49 FAMILY HISTORY OF ISCHEMIC HEART DISEASE: ICD-10-CM

## 2018-08-29 DIAGNOSIS — I34.0 NON-RHEUMATIC MITRAL REGURGITATION: ICD-10-CM

## 2018-08-29 PROCEDURE — 25000132 ZZH RX MED GY IP 250 OP 250 PS 637: Performed by: INTERNAL MEDICINE

## 2018-08-29 PROCEDURE — 25000128 H RX IP 250 OP 636: Performed by: INTERNAL MEDICINE

## 2018-08-29 PROCEDURE — 75574 CT ANGIO HRT W/3D IMAGE: CPT

## 2018-08-29 PROCEDURE — 75574 CT ANGIO HRT W/3D IMAGE: CPT | Mod: 26 | Performed by: INTERNAL MEDICINE

## 2018-08-29 RX ORDER — DIPHENHYDRAMINE HYDROCHLORIDE 50 MG/ML
25-50 INJECTION INTRAMUSCULAR; INTRAVENOUS
Status: DISCONTINUED | OUTPATIENT
Start: 2018-08-29 | End: 2018-08-30 | Stop reason: HOSPADM

## 2018-08-29 RX ORDER — METHYLPREDNISOLONE SODIUM SUCCINATE 125 MG/2ML
125 INJECTION, POWDER, LYOPHILIZED, FOR SOLUTION INTRAMUSCULAR; INTRAVENOUS
Status: DISCONTINUED | OUTPATIENT
Start: 2018-08-29 | End: 2018-08-30 | Stop reason: HOSPADM

## 2018-08-29 RX ORDER — METOPROLOL TARTRATE 1 MG/ML
5-15 INJECTION, SOLUTION INTRAVENOUS
Status: DISCONTINUED | OUTPATIENT
Start: 2018-08-29 | End: 2018-08-30 | Stop reason: HOSPADM

## 2018-08-29 RX ORDER — IOPAMIDOL 755 MG/ML
500 INJECTION, SOLUTION INTRAVASCULAR ONCE
Status: COMPLETED | OUTPATIENT
Start: 2018-08-29 | End: 2018-08-29

## 2018-08-29 RX ORDER — METOPROLOL TARTRATE 50 MG
50-100 TABLET ORAL
Status: DISCONTINUED | OUTPATIENT
Start: 2018-08-29 | End: 2018-08-30 | Stop reason: HOSPADM

## 2018-08-29 RX ORDER — DIPHENHYDRAMINE HCL 25 MG
25 CAPSULE ORAL
Status: DISCONTINUED | OUTPATIENT
Start: 2018-08-29 | End: 2018-08-30 | Stop reason: HOSPADM

## 2018-08-29 RX ORDER — NITROGLYCERIN 0.4 MG/1
0.4 TABLET SUBLINGUAL
Status: DISCONTINUED | OUTPATIENT
Start: 2018-08-29 | End: 2018-08-30 | Stop reason: HOSPADM

## 2018-08-29 RX ORDER — ACYCLOVIR 200 MG/1
0-1 CAPSULE ORAL
Status: DISCONTINUED | OUTPATIENT
Start: 2018-08-29 | End: 2018-08-30 | Stop reason: HOSPADM

## 2018-08-29 RX ORDER — ONDANSETRON 2 MG/ML
4 INJECTION INTRAMUSCULAR; INTRAVENOUS
Status: DISCONTINUED | OUTPATIENT
Start: 2018-08-29 | End: 2018-08-30 | Stop reason: HOSPADM

## 2018-08-29 RX ADMIN — SODIUM CHLORIDE 100 ML: 9 INJECTION, SOLUTION INTRAVENOUS at 09:44

## 2018-08-29 RX ADMIN — NITROGLYCERIN 0.4 MG: 0.4 TABLET SUBLINGUAL at 09:45

## 2018-08-29 RX ADMIN — IOPAMIDOL 110 ML: 755 INJECTION, SOLUTION INTRAVENOUS at 09:44

## 2018-08-30 NOTE — PROGRESS NOTES
Call to patient ie Dr Solis reviewed CTa and radiology report and plan for her to see patient next year with testing, visit not needed 8/31, nurse to call patient with results.   Left message 8/31 visit not needed and to call back for results.  Lyla Kendall RN 08/30/18 4:16 PM    Results letter mailed.  Lyla Kendall RN 09/20/18 2:34 PM

## 2018-09-04 ASSESSMENT — ENCOUNTER SYMPTOMS
SPUTUM PRODUCTION: 0
SNORES LOUDLY: 1
SPEECH CHANGE: 0
BOWEL INCONTINENCE: 0
MUSCLE WEAKNESS: 0
NECK PAIN: 0
HEARTBURN: 0
BLOOD IN STOOL: 0
EYE IRRITATION: 1
JAUNDICE: 0
DIARRHEA: 0
SMELL DISTURBANCE: 0
EYE REDNESS: 0
DIZZINESS: 1
BLOATING: 1
DISTURBANCES IN COORDINATION: 0
WEAKNESS: 0
DOUBLE VISION: 0
TASTE DISTURBANCE: 0
NAUSEA: 0
VOMITING: 0
SINUS PAIN: 0
DYSPNEA ON EXERTION: 1
SINUS CONGESTION: 0
COUGH DISTURBING SLEEP: 0
SHORTNESS OF BREATH: 1
NUMBNESS: 1
RECTAL PAIN: 0
STIFFNESS: 0
EYE WATERING: 0
HEMOPTYSIS: 0
SEIZURES: 0
EYE PAIN: 0
TREMORS: 0
COUGH: 0
ARTHRALGIAS: 1
SORE THROAT: 0
CONSTIPATION: 1
TINGLING: 0
WHEEZING: 0
HEADACHES: 1
MUSCLE CRAMPS: 0
NECK MASS: 0
JOINT SWELLING: 0
PARALYSIS: 0
TROUBLE SWALLOWING: 0
POSTURAL DYSPNEA: 0
MYALGIAS: 0
HOARSE VOICE: 0
LOSS OF CONSCIOUSNESS: 0
ABDOMINAL PAIN: 0
MEMORY LOSS: 0
BACK PAIN: 0

## 2018-09-05 ENCOUNTER — OFFICE VISIT (OUTPATIENT)
Dept: PULMONOLOGY | Facility: CLINIC | Age: 48
End: 2018-09-05
Attending: INTERNAL MEDICINE
Payer: COMMERCIAL

## 2018-09-05 ENCOUNTER — PRE VISIT (OUTPATIENT)
Dept: PULMONOLOGY | Facility: CLINIC | Age: 48
End: 2018-09-05

## 2018-09-05 VITALS
HEIGHT: 64 IN | RESPIRATION RATE: 17 BRPM | HEART RATE: 53 BPM | SYSTOLIC BLOOD PRESSURE: 120 MMHG | BODY MASS INDEX: 22.71 KG/M2 | DIASTOLIC BLOOD PRESSURE: 77 MMHG | WEIGHT: 133 LBS | OXYGEN SATURATION: 98 %

## 2018-09-05 DIAGNOSIS — I34.0 NON-RHEUMATIC MITRAL REGURGITATION: ICD-10-CM

## 2018-09-05 DIAGNOSIS — Z82.49 FAMILY HISTORY OF ISCHEMIC HEART DISEASE: ICD-10-CM

## 2018-09-05 DIAGNOSIS — R06.00 DYSPNEA, UNSPECIFIED TYPE: ICD-10-CM

## 2018-09-05 DIAGNOSIS — R07.89 CHEST TIGHTNESS: ICD-10-CM

## 2018-09-05 DIAGNOSIS — R06.00 DYSPNEA: ICD-10-CM

## 2018-09-05 DIAGNOSIS — R91.1 SOLITARY LUNG NODULE: Primary | ICD-10-CM

## 2018-09-05 PROCEDURE — G0463 HOSPITAL OUTPT CLINIC VISIT: HCPCS | Mod: ZF

## 2018-09-05 ASSESSMENT — ENCOUNTER SYMPTOMS
EYE REDNESS: 0
SEIZURES: 0
ABDOMINAL PAIN: 0
NAUSEA: 0
EYE PAIN: 0
VOMITING: 0
SHORTNESS OF BREATH: 1
LOSS OF CONSCIOUSNESS: 0
WHEEZING: 0
SORE THROAT: 0
TINGLING: 0
HEMOPTYSIS: 0
CONSTIPATION: 1
SPEECH CHANGE: 0
MYALGIAS: 0
SPUTUM PRODUCTION: 0
HEADACHES: 1
BACK PAIN: 0
DIARRHEA: 0
WEAKNESS: 0
DOUBLE VISION: 0
COUGH: 0
DIZZINESS: 1
TREMORS: 0
HEARTBURN: 0
BLOOD IN STOOL: 0
NECK PAIN: 0
MEMORY LOSS: 0

## 2018-09-05 ASSESSMENT — PAIN SCALES - GENERAL: PAINLEVEL: NO PAIN (0)

## 2018-09-05 NOTE — PROGRESS NOTES
"          Pulmonary Clinic  New Patient Evaluation    Name: Letitia Yee MRN: [id number]     Age: 47 year old   YOB: 1970             HPI:   CC: Fatigue, chest tightness    Letitia Yee is a 47 year old female with H/O sinus bradycardia and mitral valve prolapse who presents to discuss a 2-3 week history of intermittent chest tightness and possible dyspnea on exertion.    2-3 months ago she began to experience excessive fatigue, occasionally requiring her to take a nap during the day.  She also experienced occasional \"cramps\" in her chest.  Based on his history as well as a loud murmur, her primary care provider referred her to cardiology where she was found to have mitral prolapse with moderate regurgitation and a mildly enlarged left ventricle.  She underwent further testing including cardiopulmonary stress test, cardiac MRI, a Holter monitor, a CT angiogram, and a VQ scan.  All of these tests were within normal limits with the exception of the above-mentioned mitral valve prolapse.  The cardio pulmonary stress test revealed normal ventilatory function with a possible cardiac limitation as evidenced by a slightly reduced peak heart rate.  Since all this testing was performed, her symptoms have changed.  She no longer has the feeling of chest cramps, but does have a new sensation of chest tightness, as though she cannot fully inhale.  She does have a history of childhood asthma but reports that this does not feel anything like which he experienced at that time.  The chest tightness is intermittent without any recognized triggers.  It occurs both at rest and with activity.  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  For example, she rode her bike from [place] to our appointment today.  The dyspnea that she " experiences is not associated with cough, wheeze, or sputum production.    She is self-employed and does not have any occupational or recreational inhaled exposures.  She does report a somewhat musty smell in her basement, but does not have any recognized mold problems.    In addition to her history of childhood asthma, her maternal grandfather suffered from asthma as does her son.  She has not had any asthma symptoms since early adulthood and does not have any recognized environmental allergies.  She does not have GERD.    She was a social smoker during her college years, smoking 3-4 cigarettes on the weekends.             Past Medical History:     Past Medical History:   Diagnosis Date     Chronic fatigue      Former smoker      H/O mitral valve prolapse 3/6/2018     Hordeolum externum of right lower eyelid 6/7/2018     Migraine with aura and without status migrainosus, not intractable 3/7/2003    ibu sometimes help and sometimes it does not     Mitral regurgitation      Sinus bradycardia              Past Surgical History:    No past surgical history on file.          Social History:     Social History     Social History     Marital status: Single     Spouse name: N/A     Number of children: N/A     Years of education: N/A     Occupational History     Not on file.     Social History Main Topics     Smoking status: Former Smoker     Packs/day: 1.00     Years: 3.00     Types: Cigarettes     Start date: 1989     Quit date: 1992     Smokeless tobacco: Never Used     Alcohol use Yes      Comment: Socially     Drug use: No     Sexual activity: Yes     Partners: Male     Other Topics Concern     Not on file     Social History Narrative            Family History:     Family History   Problem Relation Age of Onset     Arthritis Mother      Depression Mother      Genitourinary Problems Mother      Heart Defect Father      Hypertension Father      Hyperlipidemia Father      Alcoholism Sister      Depression Sister       "Mental Illness Sister      Alcoholism Brother      Depression Brother      Asthma Maternal Grandfather              Immunizations:                 Allergies:     Allergies   Allergen Reactions     Adhesive Tape Rash             Medications:     Current Outpatient Prescriptions on File Prior to Visit:  acyclovir (ZOVIRAX) 400 MG tablet TK 1 T PO  BID. TK 1 T PO BID FOR 5 DAYS FOR EACH OUTBREAK   albuterol (PROAIR HFA/PROVENTIL HFA/VENTOLIN HFA) 108 (90 Base) MCG/ACT inhaler Inhale 2 puffs into the lungs every 4 hours as needed for shortness of breath / dyspnea or wheezing   LORazepam (ATIVAN) 0.5 MG tablet TK 1 T PO Q 8 H PRA     No current facility-administered medications on file prior to visit.          Review of Systems:     Review of Systems   Constitutional: Positive for malaise/fatigue. Negative for chills and fever.   HENT: Positive for tinnitus. Negative for ear discharge, ear pain, hearing loss, nosebleeds and sore throat.    Eyes: Negative for double vision, pain and redness.   Respiratory: Positive for shortness of breath. Negative for cough, hemoptysis, sputum production and wheezing.    Cardiovascular: Negative for chest pain, leg swelling and PND.   Gastrointestinal: Positive for constipation. Negative for abdominal pain, blood in stool, diarrhea, heartburn, melena, nausea and vomiting.   Genitourinary: Negative.    Musculoskeletal: Negative for back pain, myalgias and neck pain.   Skin: Negative for rash.   Neurological: Positive for dizziness and headaches. Negative for tingling, tremors, speech change, seizures, loss of consciousness and weakness.   Endo/Heme/Allergies: Does not bruise/bleed easily.   Psychiatric/Behavioral: Negative for memory loss.              Exam:   /77  Pulse 53  Resp 17  Ht 1.626 m (5' 4\")  Wt 60.3 kg (133 lb)  SpO2 98%  BMI 22.83 kg/m2    Physical Exam   Constitutional: She is oriented to person, place, and time and well-developed, well-nourished, and in no " distress. No distress.   HENT:   Head: Normocephalic and atraumatic.   Right Ear: External ear normal.   Left Ear: External ear normal.   Mouth/Throat: Oropharynx is clear and moist.   Eyes: Conjunctivae and EOM are normal. Pupils are equal, round, and reactive to light. No scleral icterus.   Neck: Normal range of motion. No thyromegaly present.   Cardiovascular: Normal rate and regular rhythm.    Murmur heard.  3/4 murmur radiating to the back   Pulmonary/Chest: Effort normal and breath sounds normal. No respiratory distress. She has no wheezes. She has no rales.   Abdominal: Soft. There is no tenderness.   Musculoskeletal: Normal range of motion. She exhibits no edema.   Lymphadenopathy:     She has no cervical adenopathy.   Neurological: She is alert and oriented to person, place, and time. Gait normal. GCS score is 15.   Skin: Skin is warm and dry. She is not diaphoretic.   Nursing note and vitals reviewed.    Fingernails without clubbing         Data:     PFT: 9/5/18      Normal PFT's.          CT Chest 8/29/18  The visible solid organs in the upper abdomen are unremarkable. No  acute osseous abnormality. Minimal bibasilar atelectasis. Nodular  opacity in the major fissure of the right lung measures up to 0.8 cm.  This may be a lymph node within the fissure, though warrants  follow-up. Recommend follow-up CT exam in in three to six months given  size of 8 mm. Minimal bibasilar atelectasis.      VQ Scan 8/14/18  IMPRESSION: Negative perfusion scan.       Cardiopulmonary Stress Test7/16/18        Cardiac MRI 7/17/18  1. The LV is mildly dilated with normal wall thickness. The global systolic function is normal. The LVEF is  56%. There are no regional wall motion abnormalities.     2. The RV is upper limits of normal in                size. The global systolic function is low normal. The RVEF is 52%.      3. Both atria are normal in size.     4. There is moderate thickening of mitral leaflets with myxomatous  "appearance, bilateral mitral leaflet  prolapse and eccentric mitral regurgitation jet, directed inferolaterally. There is moderate mitral  regurgitation with MR regurgitant volume of 33cc (LV SV- LVOT FF), and regurgitant fraction of 31%. There  is mild pulmonic regurgitation with regurgitant fraction of 20%.     5. Late gadolinium enhancement imaging shows no MI, fibrosis or infiltrative disease of the myocardium  except for patchy scarring of posterior papillary muscle and MV apparatus that can be seen in patients with  myxomatous mitral valve.      6. There is trivial pericardial effusion.     7. There is no intracardiac thrombus.     CONCLUSIONS:   Mildly dilated left ventricle with normal LV systolic function.  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 The MR  regurgitant volume is 33 ml and regurgitant fraction of 31%.  Mild pulmonic regurgitation.        Echo 4/20/18  The visual ejection fraction is estimated at 60-65%.  The left ventricle is mildly dilated.  There is moderate (2+) mitral regurgitation.  There is moderate mitral valve prolapse.     The degree of MR can be underestmated with eccentric jets of MR. LV systolic  performance is well preserved, however there is mild LV chamber dilation.  There are no old studies availbable for comparison           Assessment and Plan:     ## Chest cramping-resolved  ## Intermittent chest tightness  ## Possible dyspnea on exertion    Her symptoms started with what she describes as \"chest cramping\" which resulted in referrals to both cardiology and pulmonology.  Prior to being able to get an appointment in our clinic she underwent a thorough cardiac evaluation which revealed mitral valve prolapse with moderate mitral regurgitation, and a cardio pulmonary exercise stress test which revealed a possible cardiac limitation in the form of a reduced maximal heart rate, with normal ventilatory function.  Over this time " her initial symptom of chest cramps resolved, though short time later (approximately 3 weeks ago) she developed intermittent chest tightness and has noted possible dyspnea on exertion.  She she is Fabrice's dyspnea with certain types of exertion such as swimming or performing with her dance group, but is not certain if this is new as she does not regularly engage in these activities.  At this time, I am not clear what is causing her current symptoms.  With the exception of the possible diminished maximal heart rate, a thorough cardiac and pulmonary evaluation has been unrevealing.  Her PFTs, imaging, and VQ scan are within normal limits, her physical exam is normal, and she does not have infectious or asthma type symptoms.  It may be that her exertional dyspnea is related to deconditioning and the chest tightness could be stress related.  Despite all the normal findings, there is a slight possibility that her symptoms are related to asthma, particularly in light of her history of childhood asthma.  I discussed my differential in detail with the patient and we discussed options for further evaluation including a methacholine challenge or a trial of the maintenance inhaler, however she declined further diagnostics at this time stating that her previous chest symptoms resolved spontaneously, and that the symptoms have been short-lived and may resolve spontaneously as well.  At this time she is not interested in aggressive evaluation but she will notify us if her symptoms persist.      ## Pulmonary nodule, solitary, incidental  An 8mm solitary nodule was incidentally found in the major fissure of the right lung on the cardiac CT scan. She is a low risk patient so we will repeat a scan in 6 months.    - CT with clinic visit in 6 months        RTC: 6 mos    Patient staffed with Dr. Chase Wallace M.D.  Pulmonary & Critical Care Fellow  Pager [phone]        Physician Attestation   I, Josie Stone,  saw this patient and agree with the findings and plan of care as documented in the note.      Items personally reviewed/procedural attestation: vitals, labs, imaging and agree with the interpretation documented in the note and spirometry report and agree with the interpretation documented in the note.    Josie Stone MD

## 2018-09-05 NOTE — MR AVS SNAPSHOT
After Visit Summary   9/5/2018    Letitia Yee    MRN: [id number]           Patient Information     Date Of Birth          1970        Visit Information        Provider Department      9/5/2018 3:35 PM Surya Wallace MD [facility]        Today's Diagnoses     Solitary lung nodule    -  1    Non-rheumatic mitral regurgitation        Dyspnea, unspecified type        Chest tightness        Family history of ischemic heart disease           Follow-ups after your visit        Follow-up notes from your care team     Return in about 6 months (around 3/5/2019), or if symptoms worsen or fail to improve.      Your next 10 appointments already scheduled     Mar 20, 2019 12:00 PM CDT   CT CHEST W/O CONTRAST with UCCT1   [facility] [place] [facility] ([facility])    [address] [address] [phone]           How do I prepare for my exam? (Food and drink instructions) No Food and Drink Restrictions.  How do I prepare for my exam? (Other instructions) You do not need to do anything special to prepare for this exam. For a sinus scan: Use your nose spray (nasal decongestant spray) as directed.  What should I wear: Please wear loose clothing, such as a sweat suit or jogging clothes. Avoid snaps, zippers and other metal. We may ask you to undress and put on a hospital gown.  How long does the exam take: Most scans take less than 20 minutes.  What should I bring: Please bring any scans or X-rays taken at other hospitals, if similar tests were done. Also bring a list of your medicines, including vitamins, minerals and over-the-counter drugs. It is safest to leave personal items at home.  Do I need a : No  is needed.  What do I need to tell my doctor? Be sure to tell your doctor: * If you have any allergies. * If there s any chance you are pregnant. * If you are breastfeeding.  What should I do  after the exam: No restrictions, You may resume normal activities.  What is this test: A CT (computed tomography) scan is a series of pictures that allows us to look inside your body. The scanner creates images of the body in cross sections, much like slices of bread. This helps us see any problems more clearly.  Who should I call with questions: If you have any questions, please call the Imaging Department where you will have your exam. Directions, parking instructions, and other information is available on our website, [email or web address].            Mar 20, 2019 12:55 PM CDT   (Arrive by 12:40 PM)   Return Visit with Surya Wallace MD   [facility] ([facility] [[email or web address] and [facility] [place])    [address] [facility]  [address] [place] [address] [phone]              Who to contact     If you have questions or need follow up information about today's clinic visit or your schedule please contact [facility] directly at [phone].  Normal or non-critical lab and imaging results will be communicated to you by MyChart, letter or phone within 4 business days after the clinic has received the results. If you do not hear from us within 7 days, please contact the clinic through [employer] or phone. If you have a critical or abnormal lab result, we will notify you by phone as soon as possible.  Submit refill requests through [employer] or call your pharmacy and they will forward the refill request to us. Please allow 3 business days for your refill to be completed.          Additional Information About Your Visit        [employer] Information     [employer] gives you secure access to your electronic health record. If you see a primary care provider, you can also send messages to your care team and make appointments. If you have questions, please call your primary care clinic.  If you do not have a primary care provider, please call [phone] and  "they will assist you.        Care EveryWhere ID     This is your Care EveryWhere ID. This could be used by other organizations to access your [place] medical records  [id number]        Your Vitals Were     Pulse Respirations Height Pulse Oximetry BMI (Body Mass Index)       53 17 1.626 m (5' 4\") 98% 22.83 kg/m2        Blood Pressure from Last 3 Encounters:   09/05/18 120/77   08/29/18 104/65   08/13/18 117/74    Weight from Last 3 Encounters:   09/05/18 60.3 kg (133 lb)   08/13/18 61.6 kg (135 lb 14.4 oz)   06/07/18 60.3 kg (133 lb)              We Performed the Following     PULMONARY MEDICINE REFERRAL        Primary Care Provider Office Phone # Fax #    Shayan Jeet Warren [phone] [address] [facility] [address]  [facility] [address]        Equal Access to Services     [facility] : Hadii aad ku koleo Soevita, waaxda luqadaha, qaybta kaalmada adeegyada, perla phillips . So [facility] [phone].    ATENCIÓN: Si habla español, tiene a rivera disposición servicios gratuitos de asistencia lingüística. Llame al [phone].    We comply with applicable federal civil rights laws and [place] laws. We do not discriminate on the basis of race, color, national origin, age, disability, sex, sexual orientation, or gender identity.            Thank you!     Thank you for choosing [facility]  for your care. Our goal is always to provide you with excellent care. Hearing back from our patients is one way we can continue to improve our services. Please take a few minutes to complete the written survey that you may receive in the mail after your visit with us. Thank you!             Your Updated Medication List - Protect others around you: Learn how to safely use, store and throw away your medicines at www.disposemymeds.org.          This list is accurate as of 9/5/18 11:59 PM.  Always use your most recent med list.                   Brand Name " Dispense Instructions for use Diagnosis    acyclovir 400 MG tablet    ZOVIRAX     TK 1 T PO  BID. TK 1 T PO BID FOR 5 DAYS FOR EACH OUTBREAK        albuterol 108 (90 Base) MCG/ACT inhaler    PROAIR HFA/PROVENTIL HFA/VENTOLIN HFA    1 Inhaler    Inhale 2 puffs into the lungs every 4 hours as needed for shortness of breath / dyspnea or wheezing    Mild intermittent reactive airway disease without complication       LORazepam 0.5 MG tablet    ATIVAN     TK 1 T PO Q 8 H PRA

## 2018-09-05 NOTE — LETTER
"9/5/2018       RE: Letitia Yee  [address]  [place] [address]     Dear Colleague,    Thank you for referring your patient, Letitia Yee, to the [facility] at [facility]. Please see a copy of my visit note below.              Pulmonary Clinic  New Patient Evaluation    Name: Letitia Yee MRN: [id number]     Age: 47 year old   YOB: 1970             HPI:   CC: Fatigue, chest tightness    Letitia Yee is a 47 year old female with H/O sinus bradycardia and mitral valve prolapse who presents to discuss a 2-3 week history of intermittent chest tightness and possible dyspnea on exertion.    2-3 months ago she began to experience excessive fatigue, occasionally requiring her to take a nap during the day.  She also experienced occasional \"cramps\" in her chest.  Based on his history as well as a loud murmur, her primary care provider referred her to cardiology where she was found to have mitral prolapse with moderate regurgitation and a mildly enlarged left ventricle.  She underwent further testing including cardiopulmonary stress test, cardiac MRI, a Holter monitor, a CT angiogram, and a VQ scan.  All of these tests were within normal limits with the exception of the above-mentioned mitral valve prolapse.  The cardio pulmonary stress test revealed normal ventilatory function with a possible cardiac limitation as evidenced by a slightly reduced peak heart rate.  Since all this testing was performed, her symptoms have changed.  She no longer has the feeling of chest cramps, but does have a new sensation of chest tightness, as though she cannot fully inhale.  She does have a history of childhood asthma but reports that this does not feel anything like which he experienced at that time.  The chest tightness is intermittent without any recognized triggers.  It occurs both at rest and with activity.  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  For example, she rode her bike from [place] to our appointment today.  The dyspnea that she experiences is not associated with cough, wheeze, or sputum production.    She is self-employed and does not have any occupational or recreational inhaled exposures.  She does report a somewhat musty smell in her basement, but does not have any recognized mold problems.    In addition to her history of childhood asthma, her maternal grandfather suffered from asthma as does her son.  She has not had any asthma symptoms since early adulthood and does not have any recognized environmental allergies.  She does not have GERD.    She was a social smoker during her college years, smoking 3-4 cigarettes on the weekends.             Past Medical History:     Past Medical History:   Diagnosis Date     Chronic fatigue      Former smoker      H/O mitral valve prolapse 3/6/2018     Hordeolum externum of right lower eyelid 6/7/2018     Migraine with aura and without status migrainosus, not intractable 3/7/2003    ibu sometimes help and sometimes it does not     Mitral regurgitation      Sinus bradycardia              Past Surgical History:    No past surgical history on file.          Social History:     Social History     Social History     Marital status: Single     Spouse name: N/A     Number of children: N/A     Years of education: N/A     Occupational History     Not on file.     Social History Main Topics     Smoking status: Former Smoker     Packs/day: 1.00     Years: 3.00     Types: Cigarettes     Start date: 1989     Quit date: 1992     Smokeless tobacco: Never Used     Alcohol use Yes      Comment: Socially     Drug use: No     Sexual activity: Yes     Partners: Male     Other Topics Concern     Not on file     Social  History Narrative            Family History:     Family History   Problem Relation Age of Onset     Arthritis Mother      Depression Mother      Genitourinary Problems Mother      Heart Defect Father      Hypertension Father      Hyperlipidemia Father      Alcoholism Sister      Depression Sister      Mental Illness Sister      Alcoholism Brother      Depression Brother      Asthma Maternal Grandfather              Immunizations:                 Allergies:     Allergies   Allergen Reactions     Adhesive Tape Rash             Medications:     Current Outpatient Prescriptions on File Prior to Visit:  acyclovir (ZOVIRAX) 400 MG tablet TK 1 T PO  BID. TK 1 T PO BID FOR 5 DAYS FOR EACH OUTBREAK   albuterol (PROAIR HFA/PROVENTIL HFA/VENTOLIN HFA) 108 (90 Base) MCG/ACT inhaler Inhale 2 puffs into the lungs every 4 hours as needed for shortness of breath / dyspnea or wheezing   LORazepam (ATIVAN) 0.5 MG tablet TK 1 T PO Q 8 H PRA     No current facility-administered medications on file prior to visit.          Review of Systems:     Review of Systems   Constitutional: Positive for malaise/fatigue. Negative for chills and fever.   HENT: Positive for tinnitus. Negative for ear discharge, ear pain, hearing loss, nosebleeds and sore throat.    Eyes: Negative for double vision, pain and redness.   Respiratory: Positive for shortness of breath. Negative for cough, hemoptysis, sputum production and wheezing.    Cardiovascular: Negative for chest pain, leg swelling and PND.   Gastrointestinal: Positive for constipation. Negative for abdominal pain, blood in stool, diarrhea, heartburn, melena, nausea and vomiting.   Genitourinary: Negative.    Musculoskeletal: Negative for back pain, myalgias and neck pain.   Skin: Negative for rash.   Neurological: Positive for dizziness and headaches. Negative for tingling, tremors, speech change, seizures, loss of consciousness and weakness.   Endo/Heme/Allergies: Does not bruise/bleed easily.  "  Psychiatric/Behavioral: Negative for memory loss.              Exam:   /77  Pulse 53  Resp 17  Ht 1.626 m (5' 4\")  Wt 60.3 kg (133 lb)  SpO2 98%  BMI 22.83 kg/m2    Physical Exam   Constitutional: She is oriented to person, place, and time and well-developed, well-nourished, and in no distress. No distress.   HENT:   Head: Normocephalic and atraumatic.   Right Ear: External ear normal.   Left Ear: External ear normal.   Mouth/Throat: Oropharynx is clear and moist.   Eyes: Conjunctivae and EOM are normal. Pupils are equal, round, and reactive to light. No scleral icterus.   Neck: Normal range of motion. No thyromegaly present.   Cardiovascular: Normal rate and regular rhythm.    Murmur heard.  3/4 murmur radiating to the back   Pulmonary/Chest: Effort normal and breath sounds normal. No respiratory distress. She has no wheezes. She has no rales.   Abdominal: Soft. There is no tenderness.   Musculoskeletal: Normal range of motion. She exhibits no edema.   Lymphadenopathy:     She has no cervical adenopathy.   Neurological: She is alert and oriented to person, place, and time. Gait normal. GCS score is 15.   Skin: Skin is warm and dry. She is not diaphoretic.   Nursing note and vitals reviewed.    Fingernails without clubbing         Data:     PFT: 9/5/18      Normal PFT's.          CT Chest 8/29/18  The visible solid organs in the upper abdomen are unremarkable. No  acute osseous abnormality. Minimal bibasilar atelectasis. Nodular  opacity in the major fissure of the right lung measures up to 0.8 cm.  This may be a lymph node within the fissure, though warrants  follow-up. Recommend follow-up CT exam in in three to six months given  size of 8 mm. Minimal bibasilar atelectasis.      VQ Scan 8/14/18  IMPRESSION: Negative perfusion scan.       Cardiopulmonary Stress Test7/16/18        Cardiac MRI 7/17/18  1. The LV is mildly dilated with normal wall thickness. The global systolic function is normal. The " "LVEF is  56%. There are no regional wall motion abnormalities.     2. The RV is upper limits of normal in                size. The global systolic function is low normal. The RVEF is 52%.      3. Both atria are normal in size.     4. There is moderate thickening of mitral leaflets with myxomatous appearance, bilateral mitral leaflet  prolapse and eccentric mitral regurgitation jet, directed inferolaterally. There is moderate mitral  regurgitation with MR regurgitant volume of 33cc (LV SV- LVOT FF), and regurgitant fraction of 31%. There  is mild pulmonic regurgitation with regurgitant fraction of 20%.     5. Late gadolinium enhancement imaging shows no MI, fibrosis or infiltrative disease of the myocardium  except for patchy scarring of posterior papillary muscle and MV apparatus that can be seen in patients with  myxomatous mitral valve.      6. There is trivial pericardial effusion.     7. There is no intracardiac thrombus.     CONCLUSIONS:   Mildly dilated left ventricle with normal LV systolic function.  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 The MR  regurgitant volume is 33 ml and regurgitant fraction of 31%.  Mild pulmonic regurgitation.        Echo 4/20/18  The visual ejection fraction is estimated at 60-65%.  The left ventricle is mildly dilated.  There is moderate (2+) mitral regurgitation.  There is moderate mitral valve prolapse.     The degree of MR can be underestmated with eccentric jets of MR. LV systolic  performance is well preserved, however there is mild LV chamber dilation.  There are no old studies availbable for comparison           Assessment and Plan:     ## Chest cramping-resolved  ## Intermittent chest tightness  ## Possible dyspnea on exertion    Her symptoms started with what she describes as \"chest cramping\" which resulted in referrals to both cardiology and pulmonology.  Prior to being able to get an appointment in our clinic " she underwent a thorough cardiac evaluation which revealed mitral valve prolapse with moderate mitral regurgitation, and a cardio pulmonary exercise stress test which revealed a possible cardiac limitation in the form of a reduced maximal heart rate, with normal ventilatory function.  Over this time her initial symptom of chest cramps resolved, though short time later (approximately 3 weeks ago) she developed intermittent chest tightness and has noted possible dyspnea on exertion.  She she is Fabrice's dyspnea with certain types of exertion such as swimming or performing with her dance group, but is not certain if this is new as she does not regularly engage in these activities.  At this time, I am not clear what is causing her current symptoms.  With the exception of the possible diminished maximal heart rate, a thorough cardiac and pulmonary evaluation has been unrevealing.  Her PFTs, imaging, and VQ scan are within normal limits, her physical exam is normal, and she does not have infectious or asthma type symptoms.  It may be that her exertional dyspnea is related to deconditioning and the chest tightness could be stress related.  Despite all the normal findings, there is a slight possibility that her symptoms are related to asthma, particularly in light of her history of childhood asthma.  I discussed my differential in detail with the patient and we discussed options for further evaluation including a methacholine challenge or a trial of the maintenance inhaler, however she declined further diagnostics at this time stating that her previous chest symptoms resolved spontaneously, and that the symptoms have been short-lived and may resolve spontaneously as well.  At this time she is not interested in aggressive evaluation but she will notify us if her symptoms persist.      ## Pulmonary nodule, solitary, incidental  An 8mm solitary nodule was incidentally found in the major fissure of the right lung on the cardiac  CT scan. She is a low risk patient so we will repeat a scan in 6 months.    - CT with clinic visit in 6 months        RTC: 6 mos    Patient staffed with Dr. Chase Wallace M.D.  Pulmonary & Critical Care Fellow  Pager [phone]        Physician Attestation   I, Josie Stone, saw this patient and agree with the findings and plan of care as documented in the note.      Items personally reviewed/procedural attestation: vitals, labs, imaging and agree with the interpretation documented in the note and spirometry report and agree with the interpretation documented in the note.    Josie Stone MD        Again, thank you for allowing me to participate in the care of your patient.      Sincerely,    Surya Wallace MD

## 2018-09-10 ASSESSMENT — ENCOUNTER SYMPTOMS
FEVER: 0
CHILLS: 0
BRUISES/BLEEDS EASILY: 0
PND: 0

## 2018-09-26 LAB
DLCOUNC-%PRED-PRE: 96 %
DLCOUNC-PRE: 22.17 ML/MIN/MMHG
DLCOUNC-PRED: 22.87 ML/MIN/MMHG
ERV-%PRED-PRE: 114 %
ERV-PRE: 1.19 L
ERV-PRED: 1.04 L
EXPTIME-PRE: 8.18 SEC
FEF2575-%PRED-PRE: 67 %
FEF2575-PRE: 1.88 L/SEC
FEF2575-PRED: 2.79 L/SEC
FEFMAX-%PRED-PRE: 94 %
FEFMAX-PRE: 6.39 L/SEC
FEFMAX-PRED: 6.77 L/SEC
FEV1-%PRED-PRE: 91 %
FEV1-PRE: 2.57 L
FEV1FEV6-PRE: 73 %
FEV1FEV6-PRED: 82 %
FEV1FVC-PRE: 73 %
FEV1FVC-PRED: 80 %
FEV1SVC-PRE: 74 %
FEV1SVC-PRED: 80 %
FIFMAX-PRE: 4 L/SEC
FRCPLETH-%PRED-PRE: 114 %
FRCPLETH-PRE: 3.07 L
FRCPLETH-PRED: 2.69 L
FVC-%PRED-PRE: 100 %
FVC-PRE: 3.51 L
FVC-PRED: 3.48 L
IC-%PRED-PRE: 92 %
IC-PRE: 2.27 L
IC-PRED: 2.47 L
RVPLETH-%PRED-PRE: 109 %
RVPLETH-PRE: 1.88 L
RVPLETH-PRED: 1.72 L
TLCPLETH-%PRED-PRE: 108 %
TLCPLETH-PRE: 5.34 L
TLCPLETH-PRED: 4.94 L
VA-%PRED-PRE: 93 %
VA-PRE: 4.75 L
VC-%PRED-PRE: 98 %
VC-PRE: 3.46 L
VC-PRED: 3.51 L

## 2018-10-12 ENCOUNTER — MYC MEDICAL ADVICE (OUTPATIENT)
Dept: FAMILY MEDICINE | Facility: CLINIC | Age: 48
End: 2018-10-12

## 2018-10-12 RX ORDER — LORAZEPAM 0.5 MG/1
TABLET ORAL
Qty: 60 TABLET | Refills: 0 | Status: CANCELLED | OUTPATIENT
Start: 2018-10-12

## 2018-10-15 NOTE — TELEPHONE ENCOUNTER
FS,     Controlled Substance Refill Request for Ativan    Last refill: 12/20/2017 #30 from previous PCP    Last clinic visit: 3/6/2018     Clinic visit frequency required: not documented  Next appt: none    Controlled substance agreement on file: No.    Documentation in problem list reviewed:  Anxiety not on problem list    Processing:  Fax Rx to [facility]    [place] reviewed:  reviewed- no concerns  Only 1 Rx filled in past year  As noted above, was Ativan from previous PCP  [place]  [email or web address]    Please authorize if appropriate.  Thanks,  Olamide DAWSON RN

## 2018-10-15 NOTE — TELEPHONE ENCOUNTER
Anxiety has not been address in our last visit. I would recommend an office visit for medication refill and signing a controlled substance agreement.     Briana

## 2018-10-25 ENCOUNTER — MYC MEDICAL ADVICE (OUTPATIENT)
Dept: FAMILY MEDICINE | Facility: CLINIC | Age: 48
End: 2018-10-25

## 2018-11-01 ENCOUNTER — OFFICE VISIT (OUTPATIENT)
Dept: FAMILY MEDICINE | Facility: CLINIC | Age: 48
End: 2018-11-01
Payer: COMMERCIAL

## 2018-11-01 VITALS
RESPIRATION RATE: 14 BRPM | OXYGEN SATURATION: 100 % | TEMPERATURE: 97.4 F | SYSTOLIC BLOOD PRESSURE: 122 MMHG | BODY MASS INDEX: 22.88 KG/M2 | HEIGHT: 64 IN | DIASTOLIC BLOOD PRESSURE: 78 MMHG | WEIGHT: 134 LBS | HEART RATE: 72 BPM

## 2018-11-01 DIAGNOSIS — F43.0 ACUTE REACTION TO STRESS: ICD-10-CM

## 2018-11-01 DIAGNOSIS — G47.00 INSOMNIA, UNSPECIFIED TYPE: Primary | ICD-10-CM

## 2018-11-01 PROCEDURE — 99213 OFFICE O/P EST LOW 20 MIN: CPT | Performed by: FAMILY MEDICINE

## 2018-11-01 RX ORDER — LORAZEPAM 0.5 MG/1
0.5 TABLET ORAL
Qty: 30 TABLET | Refills: 0 | Status: SHIPPED | OUTPATIENT
Start: 2018-11-01 | End: 2019-02-25

## 2018-11-01 ASSESSMENT — ANXIETY QUESTIONNAIRES
IF YOU CHECKED OFF ANY PROBLEMS ON THIS QUESTIONNAIRE, HOW DIFFICULT HAVE THESE PROBLEMS MADE IT FOR YOU TO DO YOUR WORK, TAKE CARE OF THINGS AT HOME, OR GET ALONG WITH OTHER PEOPLE: SOMEWHAT DIFFICULT
GAD7 TOTAL SCORE: 7
7. FEELING AFRAID AS IF SOMETHING AWFUL MIGHT HAPPEN: SEVERAL DAYS
3. WORRYING TOO MUCH ABOUT DIFFERENT THINGS: NOT AT ALL
6. BECOMING EASILY ANNOYED OR IRRITABLE: SEVERAL DAYS
2. NOT BEING ABLE TO STOP OR CONTROL WORRYING: SEVERAL DAYS
5. BEING SO RESTLESS THAT IT IS HARD TO SIT STILL: NOT AT ALL
1. FEELING NERVOUS, ANXIOUS, OR ON EDGE: MORE THAN HALF THE DAYS

## 2018-11-01 ASSESSMENT — PATIENT HEALTH QUESTIONNAIRE - PHQ9: 5. POOR APPETITE OR OVEREATING: MORE THAN HALF THE DAYS

## 2018-11-01 NOTE — NURSING NOTE
"Chief Complaint   Patient presents with     Recheck Medication     refill lorazepam       Initial /78  Pulse 72  Temp 97.4  F (36.3  C) (Oral)  Resp 14  Ht 5' 4\" (1.626 m)  Wt 134 lb (60.8 kg)  SpO2 100%  Breastfeeding? No  BMI 23 kg/m2 Estimated body mass index is 23 kg/(m^2) as calculated from the following:    Height as of this encounter: 5' 4\" (1.626 m).    Weight as of this encounter: 134 lb (60.8 kg).  BP completed using cuff size: regular    Health Maintenance that is potentially due pending provider review:  Health Maintenance Due   Topic Date Due     ASTHMA ACTION PLAN Q1 YR  11/16/1974     HIV SCREEN (SYSTEM ASSIGNED)  11/16/1987     INFLUENZA VACCINE (1) 09/01/2018         Flu vaccine done last week  "

## 2018-11-01 NOTE — MR AVS SNAPSHOT
After Visit Summary   11/1/2018    Letitia Yee    MRN: [id number]           Patient Information     Date Of Birth          1970        Visit Information        Provider Department      11/1/2018 1:00 PM Shayan Warren MD [facility]        Today's Diagnoses     Insomnia, unspecified type    -  1      Care Instructions      Insomnia  Insomnia is repeated difficulty going to sleep or staying asleep, or both. Whether you have insomnia is not defined by a specific amount of sleep. Different people need different amounts of sleep, and you may need more or less sleep at different times of your life.  There are 3 major types of insomnia:  short-term, chronic, and  other.   Short-term, or acute insomnia lasts less than 3 months.  The symptoms are temporary and can be linked directly to a stressor, such as the death of a loved one, financial problems, or a new physical problem.  Short-term insomnia stops when the stressor resolves or the person adapts to its presence.  Chronic insomnia occurs at least 3 times a week and lasts longer than 3 months.  Chronic insomnia can occur when either the cause of the sleeping problem is not clear, or the insomnia does not get better when the stressor is resolved. A number of other criteria are also used to make the diagnosis of chronic insomnia.    Other insomnia  is the third type of insomnia-related sleep disorders.  This description applies to people who have problems getting to sleep or staying asleep, but do not meet all of the factors that describe either short-term or chronic insomnia.    Many things cause insomnia. Different people may have different causes. It can be from an underlying medical or psychological condition, or lifestyle. It can also be primary insomnia, which means no cause can be found.  Causes of insomnia include:    Chronic medical problems- heart disease, gastrointestinal problems, hormonal changes, breathing  problems    Anxiety    Stress    Depression    Pain    Work schedule    Sleep apnea    Illegal drugs    Certain medicines  Many different medidcines can affect your sleep, such as stimulants, caffeine, alcohol, some decongestants, and diet pills. Other medicines may include some types of blood pressure pills, steroids, asthma medicines, antihistamines, antidepressants, seizure medicines and statins. Not all of these will affect your sleep, and they shouldn t be stopped without talking to your doctor.  Symptoms of insomnia can include:    Lying awake for long periods at night before falling asleep    Waking up several times during the night    Waking up early in the morning and not being able to get back to sleep    Feeling tired and not refreshed by sleep    Not being able to function properly during the day and finding it hard to concentrate    Irritability    Tiredness and fatigue during the day  Home care    Review your medicines with your doctor or pharmacist to find out if they can cause insomnia. Not all medicines will affect your sleep, but they shouldn't be stopped without reviewing them with your doctor. There may be serious side effects and consequences from suddenly stopping your medicines. Not taking them may cause strokes, heart attacks, and many other problems.    Caffeine, smoking and alcohol also affect sleep. Limit your daily use and do not use these before bedtime. Alcohol may make you sleepy at first, but as its effects wear off, you may awaken a few hours later and have trouble returning to sleep.    Do not exercise, eat or drink large amounts of liquid within 2 hours of your bedtime.    Improve your sleep habits. Have a fixed bed and wake-up time. Try to keep noise, light and heat in your bedroom at a comfortable level. Try using earplugs or eyeshades if needed.     Avoid watching TV in bed.    If you do not fall asleep within 30 minutes, try to relax by reading or listening to soft  music.    Limit daytime napping to one 30 minute period, early in the day.    Get regular exercise. Find other ways to lessen your stress level.    If a medicine was prescribed to help reset your sleep patterns, take it as directed. Sleeping pills are intended for short-term use, only. If taken for too long, the effect wears off while the risk of physical addiction and psychological dependence increases.  Sleep diary  If the cause isn t obvious and it is not improving, try keeping a  sleep diary  for a couple of weeks. Include in it:    The time you go to bed    How long it takes to fall asleep    How many times you wake up    What time you wake up    Your meal times and what you eat    What time you drink alcohol    Your exercise habits and times  Follow-up care  Follow up with your healthcare provider, or as advised. If X-rays or CT scans were done, you will be notified if there is a change in the reading, especially if it affects treatment.  Call 911  Call 911 if any of these occur:    Trouble breathing    Confusion or trouble waking    Fainting or loss of consciousness    Rapid heart rate    New chest, arm, shoulder, neck or upper back pain    Trouble with speech or vision, weakness of an arm or leg    Trouble walking or talking, loss of balance, numbness or weakness in one side of your body, facial droop  When to seek medical advice  Call your healthcare provider right away if any of these occur:    Extreme restlessness or irritability    Confusion or hallucinations (seeing or hearing things that are not there)    Anxiety, depression    Several days without sleeping  Date Last Reviewed: 11/19/2015 2000-2017 [employer]. [address] [place] [address], [place] [address]. All rights reserved. This information is not intended as a substitute for professional medical care. Always follow your healthcare professional's instructions.                Follow-ups after your visit        Your next 10 appointments  already scheduled     Mar 20, 2019 12:00 PM CDT   CT CHEST W/O CONTRAST with UCCT1   [facility] CT ([facility])    [address]  1st Floor  [facility] [address] [phone]           How do I prepare for my exam? (Food and drink instructions) No Food and Drink Restrictions.  How do I prepare for my exam? (Other instructions) You do not need to do anything special to prepare for this exam. For a sinus scan: Use your nose spray (nasal decongestant spray) as directed.  What should I wear: Please wear loose clothing, such as a sweat suit or jogging clothes. Avoid snaps, zippers and other metal. We may ask you to undress and put on a hospital gown.  How long does the exam take: Most scans take less than 20 minutes.  What should I bring: Please bring any scans or X-rays taken at other hospitals, if similar tests were done. Also bring a list of your medicines, including vitamins, minerals and over-the-counter drugs. It is safest to leave personal items at home.  Do I need a : No  is needed.  What do I need to tell my doctor? Be sure to tell your doctor: * If you have any allergies. * If there s any chance you are pregnant. * If you are breastfeeding.  What should I do after the exam: No restrictions, You may resume normal activities.  What is this test: A CT (computed tomography) scan is a series of pictures that allows us to look inside your body. The scanner creates images of the body in cross sections, much like slices of bread. This helps us see any problems more clearly.  Who should I call with questions: If you have any questions, please call the Imaging Department where you will have your exam. Directions, parking instructions, and other information is available on our website, [email or web address].            Mar 20, 2019 12:55 PM CDT   (Arrive by 12:40 PM)   Return Visit with Surya Wallace MD   [facility]  "([facility] [place])    [address]  [address] [place] [address] [phone]              Who to contact     If you have questions or need follow up information about today's clinic visit or your schedule please contact [facility] directly at [phone].  Normal or non-critical lab and imaging results will be communicated to you by MyChart, letter or phone within 4 business days after the clinic has received the results. If you do not hear from us within 7 days, please contact the clinic through [employer] or phone. If you have a critical or abnormal lab result, we will notify you by phone as soon as possible.  Submit refill requests through [employer] or call your pharmacy and they will forward the refill request to us. Please allow 3 business days for your refill to be completed.          Additional Information About Your Visit        [employer] Information     [employer] gives you secure access to your electronic health record. If you see a primary care provider, you can also send messages to your care team and make appointments. If you have questions, please call your primary care clinic.  If you do not have a primary care provider, please call [phone] and they will assist you.        Care EveryWhere ID     This is your Care EveryWhere ID. This could be used by other organizations to access your [place] medical records  [id number]        Your Vitals Were     Pulse Temperature Respirations Height Pulse Oximetry Breastfeeding?    72 97.4  F (36.3  C) (Oral) 14 5' 4\" (1.626 m) 100% No    BMI (Body Mass Index)                   23 kg/m2            Blood Pressure from Last 3 Encounters:   11/01/18 122/78   09/05/18 120/77   08/29/18 104/65    Weight from Last 3 Encounters:   11/01/18 134 lb (60.8 kg)   09/05/18 133 lb (60.3 kg)   08/13/18 135 lb 14.4 oz (61.6 kg)              Today, you had the following     No orders found for display         Today's Medication Changes "          These changes are accurate as of 11/1/18  1:44 PM.  If you have any questions, ask your nurse or doctor.               These medicines have changed or have updated prescriptions.        Dose/Directions    LORazepam 0.5 MG tablet   Commonly known as:  ATIVAN   This may have changed:  See the new instructions.   Used for:  Insomnia, unspecified type   Changed by:  Shayan Warren MD        Dose:  0.5 mg   Take 1 tablet (0.5 mg) by mouth nightly as needed for agitation or anxiety   Quantity:  30 tablet   Refills:  0            Where to get your medicines      Some of these will need a paper prescription and others can be bought over the counter.  Ask your nurse if you have questions.     Bring a paper prescription for each of these medications     LORazepam 0.5 MG tablet                Primary Care Provider Office Phone # Fax #    Shayan Warren [phone] [address] [facility] [address]  [facility] [address]        Equal Access to Services     [facility]: Hadii brandy noland hadasho Soevita, waaxda luqadaha, qaybta kaalmada adeegyada, perla phillips . So [facility] [phone].    ATENCIÓN: Si habla español, tiene a rivera disposición servicios gratuitos de asistencia lingüística. [facility] [phone].    We comply with applicable federal civil rights laws and [place] laws. We do not discriminate on the basis of race, color, national origin, age, disability, sex, sexual orientation, or gender identity.            Thank you!     Thank you for choosing [facility]  for your care. Our goal is always to provide you with excellent care. Hearing back from our patients is one way we can continue to improve our services. Please take a few minutes to complete the written survey that you may receive in the mail after your visit with us. Thank you!             Your Updated Medication List - Protect others around you: Learn how to safely use, store and throw away  your medicines at www.disposemymeds.org.          This list is accurate as of 11/1/18  1:44 PM.  Always use your most recent med list.                   Brand Name Dispense Instructions for use Diagnosis    acyclovir 400 MG tablet    ZOVIRAX     TK 1 T PO  BID. TK 1 T PO BID FOR 5 DAYS FOR EACH OUTBREAK        albuterol 108 (90 Base) MCG/ACT inhaler    PROAIR HFA/PROVENTIL HFA/VENTOLIN HFA    1 Inhaler    Inhale 2 puffs into the lungs every 4 hours as needed for shortness of breath / dyspnea or wheezing    Mild intermittent reactive airway disease without complication       levonorgestrel 20 MCG/24HR IUD    MIRENA     1 each by Intrauterine route once        LORazepam 0.5 MG tablet    ATIVAN    30 tablet    Take 1 tablet (0.5 mg) by mouth nightly as needed for agitation or anxiety    Insomnia, unspecified type

## 2018-11-01 NOTE — LETTER
[employer] [employer] [facility] [employer]    11/01/18    Patient: Letitia Yee  YOB: 1970  Medical Record Number: [id number]                                                                  Controlled Substance Agreement  I understand that my care provider has prescribed controlled substances (narcotics, tranquilizers, and/or stimulants) to help manage my condition(s).  I am taking this medicine to help me function or work.  I know that this is strong medicine.  It could have serious side effects and even cause a dependency on the drug.  If I stop these medicines suddenly, I could have severe withdrawal symptoms.    The risks, benefits, and side effects of these medication(s) were explained to me.  I agree that:  1. I will take part in other treatments as advised by my provider.  This may be psychiatry or counseling, physical therapy, behavioral therapy, group treatment, or a referral to a pain clinic.  I will reduce or stop my medicine when my provider tells me to do so.   2. I will take my medicines as prescribed.  I will not change the dose or schedule unless my provider tells me to.  There will be no refills if I  run out early.   I may be contacted at any time without warning and asked to complete a drug test or pill count.   3. I will keep all my appointments at the clinic.  If I miss appointments or fail to follow instructions, my provider may stop my medicine.  4. I will not ask other providers to prescribe controlled substances. And I will not accept controlled substances from other people. If I need another prescribed controlled substance for a new reason, I will notify my provider within one business day.  5. If I enroll in the [place] Medical Marijuana program, I will tell my provider.  I will also sign an agreement to share my medical records with my provider.  6. I will use one pharmacy to fill all of my controlled substance prescriptions.  If my prescription is mailed to my pharmacy, it may  take 5 to 7 days for my medicine to be ready.  7. I understand that my provider, clinic care team, and pharmacy can track controlled substance prescriptions from other providers through a central database (prescription monitoring program).  8. I will bring in my list of medications (or my medicine bottles) each time I come to the clinic.  [id number] REV-  07/2018                                                                                                                                   Page 1 of 2      [facility]    11[facility]    Patient: Letitia Yee  YOB: 1970  Medical Record Number: [id number]    9. Refills of controlled substances will be made only during office hours.  It is up to me to make sure that I do not run out of my medicines on weekends or holidays.    10. I am responsible for my prescriptions.  If the medicine/prescription is lost or stolen, it will not be replaced.   I also agree not to share these medicines with anyone.  11. I agree to not use ANY illegal or recreational drugs.  This includes marijuana, cocaine, bath salts or other drugs.  I agree not to use alcohol unless my provider says I may.  I agree to give urine samples whenever asked.  If I fail to give a urine sample, the provider may stop my medicine.     12. I will tell my nurse or provider right away if I become pregnant or have a new medical problem treated outside of [facility].  13. I understand that this medicine can affect my thinking and judgment.  It may be unsafe for me to drive, use machinery and do dangerous tasks.  I will not do any of these things until I know how the medicine affects me.  If my dose changes, I will wait to see how it affects me.  I will contact my provider if I have concerns about medicine side effects.  I understand that if I do not follow any of the conditions above, my prescriptions or treatment may be stopped.    I agree that my provider, clinic care team, and  pharmacy may work with any city, state or federal law enforcement agency that investigates the misuse, sale, or other diversion of my controlled medicine. I will allow my provider to discuss my care with or share a copy of this agreement with any other treating provider, pharmacy or emergency room where I receive care.  I agree to give up (waive) any right of privacy or confidentiality with respect to these authorizations.   I have read this agreement and have asked questions about anything I did not understand.   ___________________________________    ___________________________  Patient Signature                                                           Date and Time  ___________________________________     ____________________________  Witness                                                                            Date and Time  ___________________________________  Shayan Warren MD  [id number] REV-  07/2018                                                                                                                                                   Page 2 of 2

## 2018-11-01 NOTE — PATIENT INSTRUCTIONS
Insomnia  Insomnia is repeated difficulty going to sleep or staying asleep, or both. Whether you have insomnia is not defined by a specific amount of sleep. Different people need different amounts of sleep, and you may need more or less sleep at different times of your life.  There are 3 major types of insomnia:  short-term, chronic, and  other.   Short-term, or acute insomnia lasts less than 3 months.  The symptoms are temporary and can be linked directly to a stressor, such as the death of a loved one, financial problems, or a new physical problem.  Short-term insomnia stops when the stressor resolves or the person adapts to its presence.  Chronic insomnia occurs at least 3 times a week and lasts longer than 3 months.  Chronic insomnia can occur when either the cause of the sleeping problem is not clear, or the insomnia does not get better when the stressor is resolved. A number of other criteria are also used to make the diagnosis of chronic insomnia.    Other insomnia  is the third type of insomnia-related sleep disorders.  This description applies to people who have problems getting to sleep or staying asleep, but do not meet all of the factors that describe either short-term or chronic insomnia.    Many things cause insomnia. Different people may have different causes. It can be from an underlying medical or psychological condition, or lifestyle. It can also be primary insomnia, which means no cause can be found.  Causes of insomnia include:    Chronic medical problems- heart disease, gastrointestinal problems, hormonal changes, breathing problems    Anxiety    Stress    Depression    Pain    Work schedule    Sleep apnea    Illegal drugs    Certain medicines  Many different medidcines can affect your sleep, such as stimulants, caffeine, alcohol, some decongestants, and diet pills. Other medicines may include some types of blood pressure pills, steroids, asthma medicines, antihistamines, antidepressants,  seizure medicines and statins. Not all of these will affect your sleep, and they shouldn t be stopped without talking to your doctor.  Symptoms of insomnia can include:    Lying awake for long periods at night before falling asleep    Waking up several times during the night    Waking up early in the morning and not being able to get back to sleep    Feeling tired and not refreshed by sleep    Not being able to function properly during the day and finding it hard to concentrate    Irritability    Tiredness and fatigue during the day  Home care    Review your medicines with your doctor or pharmacist to find out if they can cause insomnia. Not all medicines will affect your sleep, but they shouldn't be stopped without reviewing them with your doctor. There may be serious side effects and consequences from suddenly stopping your medicines. Not taking them may cause strokes, heart attacks, and many other problems.    Caffeine, smoking and alcohol also affect sleep. Limit your daily use and do not use these before bedtime. Alcohol may make you sleepy at first, but as its effects wear off, you may awaken a few hours later and have trouble returning to sleep.    Do not exercise, eat or drink large amounts of liquid within 2 hours of your bedtime.    Improve your sleep habits. Have a fixed bed and wake-up time. Try to keep noise, light and heat in your bedroom at a comfortable level. Try using earplugs or eyeshades if needed.     Avoid watching TV in bed.    If you do not fall asleep within 30 minutes, try to relax by reading or listening to soft music.    Limit daytime napping to one 30 minute period, early in the day.    Get regular exercise. Find other ways to lessen your stress level.    If a medicine was prescribed to help reset your sleep patterns, take it as directed. Sleeping pills are intended for short-term use, only. If taken for too long, the effect wears off while the risk of physical addiction and  psychological dependence increases.  Sleep diary  If the cause isn t obvious and it is not improving, try keeping a  sleep diary  for a couple of weeks. Include in it:    The time you go to bed    How long it takes to fall asleep    How many times you wake up    What time you wake up    Your meal times and what you eat    What time you drink alcohol    Your exercise habits and times  Follow-up care  Follow up with your healthcare provider, or as advised. If X-rays or CT scans were done, you will be notified if there is a change in the reading, especially if it affects treatment.  Call 911  Call 911 if any of these occur:    Trouble breathing    Confusion or trouble waking    Fainting or loss of consciousness    Rapid heart rate    New chest, arm, shoulder, neck or upper back pain    Trouble with speech or vision, weakness of an arm or leg    Trouble walking or talking, loss of balance, numbness or weakness in one side of your body, facial droop  When to seek medical advice  Call your healthcare provider right away if any of these occur:    Extreme restlessness or irritability    Confusion or hallucinations (seeing or hearing things that are not there)    Anxiety, depression    Several days without sleeping  Date Last Reviewed: 11/19/2015 2000-2017 [employer]. [address] [place] [address], [place] [address]. All rights reserved. This information is not intended as a substitute for professional medical care. Always follow your healthcare professional's instructions.

## 2018-11-01 NOTE — LETTER
My Asthma Action Plan  Name: Letitia Yee   YOB: 1970  Date: 11/2/2018   My doctor: Shayan Warren MD   My clinic: [facility]        My Control Medicine: None  My Rescue Medicine: Albuterol (Proair/Ventolin/Proventil) inhaler 2 puffs as needed   My Asthma Severity: exercise induced.   Avoid your asthma triggers: sports             GREEN ZONE   Good Control    I feel good    No cough or wheeze    Can work, sleep and play without asthma symptoms       Take your asthma control medicine every day.     1. If exercise triggers your asthma, take your rescue medication    15 minutes before exercise or sports, and    During exercise if you have asthma symptoms  2. Spacer to use with inhaler: If you have a spacer, make sure to use it with your inhaler             YELLOW ZONE Getting Worse  I have ANY of these:    I do not feel good    Cough or wheeze    Chest feels tight    Wake up at night   1. Keep taking your Green Zone medications  2. Start taking your rescue medicine:    every 20 minutes for up to 1 hour. Then every 4 hours for 24-48 hours.  3. If you stay in the Yellow Zone for more than 12-24 hours, contact your doctor.  4. If you do not return to the Green Zone in 12-24 hours or you get worse, start taking your oral steroid medicine if prescribed by your provider.           RED ZONE Medical Alert - Get Help  I have ANY of these:    I feel awful    Medicine is not helping    Breathing getting harder    Trouble walking or talking    Nose opens wide to breathe       1. Take your rescue medicine NOW  2. If your provider has prescribed an oral steroid medicine, start taking it NOW  3. Call your doctor NOW  4. If you are still in the Red Zone after 20 minutes and you have not reached your doctor:    Take your rescue medicine again and    Call 911 or go to the emergency room right away    See your regular doctor within 2 weeks of an Emergency Room or Urgent Care visit for follow-up  treatment.          Annual Reminders:  Meet with Asthma Educator,  Flu Shot in the Fall, consider Pneumonia Vaccination for patients with asthma (aged 19 and older).    Pharmacy:    [id number] - [place], [place] - [address] AT [facility] & [address]  [id number] - [place], [place] - [address] AT [facility] [address]                      Asthma Triggers  How To Control Things That Make Your Asthma Worse    Triggers are things that make your asthma worse.  Look at the list below to help you find your triggers and what you can do about them.  You can help prevent asthma flare-ups by staying away from your triggers.      Trigger                                                          What you can do   Cigarette Smoke  Tobacco smoke can make asthma worse. Do not allow smoking in your home, car or around you.  Be sure no one smokes at a child s day care or school.  If you smoke, ask your health care provider for ways to help you quit.  Ask family members to quit too.  Ask your health care provider for a referral to Quit Plan to help you quit smoking, or call 9-120-242-PLAN.     Colds, Flu, Bronchitis  These are common triggers of asthma. Wash your hands often.  Don t touch your eyes, nose or mouth.  Get a flu shot every year.     Dust Mites  These are tiny bugs that live in cloth or carpet. They are too small to see. Wash sheets and blankets in hot water every week.   Encase pillows and mattress in dust mite proof covers.  Avoid having carpet if you can. If you have carpet, vacuum weekly.   Use a dust mask and HEPA vacuum.   Pollen and Outdoor Mold  Some people are allergic to trees, grass, or weed pollen, or molds. Try to keep your windows closed.  Limit time out doors when pollen count is high.   Ask you health care provider about taking medicine during allergy season.     Animal Dander  Some people are allergic to skin flakes, urine or saliva from pets with fur or  feathers. Keep pets with fur or feathers out of your home.    If you can t keep the pet outdoors, then keep the pet out of your bedroom.  Keep the bedroom door closed.  Keep pets off cloth furniture and away from stuffed toys.     Mice, Rats, and Cockroaches  Some people are allergic to the waste from these pests.   Cover food and garbage.  Clean up spills and food crumbs.  Store grease in the refrigerator.   Keep food out of the bedroom.   Indoor Mold  This can be a trigger if your home has high moisture. Fix leaking faucets, pipes, or other sources of water.   Clean moldy surfaces.  Dehumidify basement if it is damp and smelly.   Smoke, Strong Odors, and Sprays  These can reduce air quality. Stay away from strong odors and sprays, such as perfume, powder, hair spray, paints, smoke incense, paint, cleaning products, candles and new carpet.   Exercise or Sports  Some people with asthma have this trigger. Be active!  Ask your doctor about taking medicine before sports or exercise to prevent symptoms.    Warm up for 5-10 minutes before and after sports or exercise.     Other Triggers of Asthma  Cold air:  Cover your nose and mouth with a scarf.  Sometimes laughing or crying can be a trigger.  Some medicines and food can trigger asthma.

## 2018-11-01 NOTE — PROGRESS NOTES
"  SUBJECTIVE:   Letitia Yee is a 47 year old female who presents to clinic today for the following health issues:    Medication Follow-Up    Status since last visit: No change    Other associated symptoms:None    Complicating factors:     Significant life event: No     Current substance abuse: None    No flowsheet data found.  JOSE-7 SCORE 11/1/2018   Total Score 7     Amount of exercise or physical activity: 4-5 days/week for an average of greater than 60 minutes    Problems taking medications regularly: No    Medication side effects: albuterol can make pt a little shaky    Diet: regular (no restrictions) and vegetarian/vegan      Patient presents to clinic for medication refills.  She was prescribed benzodiazepine to alleviate insomnia and stress related to life changes.  Patient was not given a diagnosis of anxiety at that time.  Currently, she is in the process of renovating 3 homes.  Unable to sleep at night because of recurrent thoughts.    Problem list and histories reviewed & adjusted, as indicated.  Additional history: as documented    Reviewed and updated as needed this visit by clinical staff  Tobacco  Allergies  Meds  Med Hx  Surg Hx  Fam Hx  Soc Hx      Reviewed and updated as needed this visit by Provider         ROS:  Constitutional, HEENT, cardiovascular, pulmonary, gi and gu systems are negative, except as otherwise noted.    OBJECTIVE:     /78  Pulse 72  Temp 97.4  F (36.3  C) (Oral)  Resp 14  Ht 5' 4\" (1.626 m)  Wt 134 lb (60.8 kg)  SpO2 100%  Breastfeeding? No  BMI 23 kg/m2  Body mass index is 23 kg/(m^2).  GENERAL: healthy, alert and no distress  RESP: lungs clear to auscultation - no rales, rhonchi or wheezes  CV: regular rate and rhythm, normal S1 S2, no S3 or S4, no murmur, click or rub, no peripheral edema and peripheral pulses strong  PSYCH: mentation appears normal, affect normal/bright    Diagnostic Test Results:  none     ASSESSMENT/PLAN:       ICD-10-CM    1. " Insomnia, unspecified type G47.00 LORazepam (ATIVAN) 0.5 MG tablet   2. Acute reaction to stress F43.0     return to clinic PRN    Shayan Warren MD  [facility]

## 2018-11-02 ASSESSMENT — ANXIETY QUESTIONNAIRES: GAD7 TOTAL SCORE: 7

## 2019-02-25 PROBLEM — F43.22 ADJUSTMENT DISORDER WITH ANXIOUS MOOD: Status: ACTIVE | Noted: 2019-02-25

## 2019-03-19 ASSESSMENT — ENCOUNTER SYMPTOMS
WEIGHT GAIN: 0
INCREASED ENERGY: 0
WHEEZING: 0
POLYDIPSIA: 0
EYE REDNESS: 0
CHILLS: 0
SINUS PAIN: 0
SPEECH CHANGE: 0
PARALYSIS: 0
HALLUCINATIONS: 0
DIZZINESS: 0
HOARSE VOICE: 1
SHORTNESS OF BREATH: 0
SMELL DISTURBANCE: 0
TREMORS: 0
POSTURAL DYSPNEA: 0
FATIGUE: 0
SINUS CONGESTION: 1
SEIZURES: 0
FEVER: 0
WEIGHT LOSS: 0
HOT FLASHES: 0
EYE PAIN: 0
SPUTUM PRODUCTION: 0
DOUBLE VISION: 0
NIGHT SWEATS: 0
WEAKNESS: 0
TINGLING: 0
TASTE DISTURBANCE: 0
LOSS OF CONSCIOUSNESS: 0
HEMOPTYSIS: 0
NECK MASS: 0
HEADACHES: 1
POLYPHAGIA: 0
ALTERED TEMPERATURE REGULATION: 0
TROUBLE SWALLOWING: 0
DECREASED LIBIDO: 0
EYE IRRITATION: 0
DYSPNEA ON EXERTION: 1
COUGH: 1
DECREASED APPETITE: 0
EYE WATERING: 0
MEMORY LOSS: 0
COUGH DISTURBING SLEEP: 0
DISTURBANCES IN COORDINATION: 0
SNORES LOUDLY: 0
SORE THROAT: 1
NUMBNESS: 0

## 2019-03-20 ENCOUNTER — OFFICE VISIT (OUTPATIENT)
Dept: PULMONOLOGY | Facility: CLINIC | Age: 49
End: 2019-03-20
Attending: INTERNAL MEDICINE
Payer: COMMERCIAL

## 2019-03-20 ENCOUNTER — ANCILLARY PROCEDURE (OUTPATIENT)
Dept: CT IMAGING | Facility: CLINIC | Age: 49
End: 2019-03-20
Attending: INTERNAL MEDICINE
Payer: COMMERCIAL

## 2019-03-20 VITALS
HEIGHT: 64 IN | OXYGEN SATURATION: 99 % | HEART RATE: 58 BPM | RESPIRATION RATE: 17 BRPM | DIASTOLIC BLOOD PRESSURE: 67 MMHG | SYSTOLIC BLOOD PRESSURE: 112 MMHG | WEIGHT: 134 LBS | BODY MASS INDEX: 22.88 KG/M2

## 2019-03-20 DIAGNOSIS — R91.1 LUNG NODULE: Primary | ICD-10-CM

## 2019-03-20 DIAGNOSIS — R91.1 SOLITARY LUNG NODULE: ICD-10-CM

## 2019-03-20 PROCEDURE — G0463 HOSPITAL OUTPT CLINIC VISIT: HCPCS | Mod: ZF

## 2019-03-20 ASSESSMENT — ENCOUNTER SYMPTOMS
FREQUENCY: 0
DIZZINESS: 0
SHORTNESS OF BREATH: 0
FEVER: 0
SORE THROAT: 1
SEIZURES: 0
VOMITING: 0
MEMORY LOSS: 0
HEARTBURN: 0
CHILLS: 0
WHEEZING: 0
DOUBLE VISION: 0
POLYDIPSIA: 0
HEMOPTYSIS: 0
WEAKNESS: 0
HEADACHES: 1
EYE PAIN: 0
LOSS OF CONSCIOUSNESS: 0
SPEECH CHANGE: 0
EYE REDNESS: 0
SPUTUM PRODUCTION: 0
COUGH: 1
TINGLING: 0
NAUSEA: 0
SINUS PAIN: 0
TREMORS: 0
HALLUCINATIONS: 0
MYALGIAS: 0
WEIGHT LOSS: 0
DYSURIA: 0

## 2019-03-20 ASSESSMENT — MIFFLIN-ST. JEOR: SCORE: 1217.82

## 2019-03-20 ASSESSMENT — PAIN SCALES - GENERAL: PAINLEVEL: NO PAIN (0)

## 2019-03-20 NOTE — LETTER
3/20/2019      RE: Letitia Yee  [address]  [place] [address]       Pulmonology Clinic Follow up Visit       Letitia Yee MRN# [id number]   YOB: 1970 Age: 48 year old     Date of Visit: 3/20/2019    Reason for Visit: Follow up incidental pulmonary nodule          Assessment and Plan:     Letitia Yee is a 48 year old female with H/O sinus bradycardia and mitral valve prolapse who presents to follow up dyspnea on exertion and pulmonary nodule.      ## Pulmonary nodule, solitary, incidental  An 8mm solitary nodule was incidentally found in the major fissure of the right lung on the cardiac CT scan on 8/29/18. She is low risk for lung cancer. A repeat CT scan was performed today with no change to the known nodule and no new nodules.  Per Fleischner guidelines an additional CT scan could be considered at 24 months.  This was discussed with the patient and she declined additional imaging which I think is reasonable.         ## Intermittent chest tightness  ## Possible dyspnea on exertion     For more details of her history please see my note dated 9/5/2018.  To summarize, she has a history of mitral valve prolapse and developed dyspnea on exertion and was referred to us for further evaluation.  She was found to have normal pulmonary function studies, normal imaging, normal VQ scan, and a normal physical exam.  Cardiopulmonary stress test revealed possible diminished maximal heart rate.  Overall her symptoms do not appear to be pulmonary.  Since her last visit, her symptoms have remained stable.  I encouraged her to increase her aerobic exercise and continue to follow-up with cardiology.      RTC: PRN    Patient seen and discussed with Dr. Gissell Wallace M.D.  Pulmonary & Critical Care Fellow  [phone]      I saw and evaluated patient with Fellow.  Case discussed - agree with note.  I reviewed chest CT: right lung nodule in the major fissure looks  "stable; radiology report indicates it could be a lymph node.  DUNN improved and now minimal and stable.    DOMINIC CODY M.D.              History of Present Illness / Interval History:     Her dyspnea is overall unchanged.  She has no trouble with her ADLs or with moderate exercise, but with more aggressive exercise such as vigorous dancing or pushing a stuck car, she notices dyspnea as well as a cramping feeling in the middle of her chest.  The dyspnea is not associated with wheezes or coughing.  She has not had any fevers, chills, weight loss, or night sweats.          Review of Systems:     Review of Systems   Constitutional: Negative for chills, fever and weight loss.   HENT: Positive for sore throat and tinnitus. Negative for ear discharge, ear pain, hearing loss, nosebleeds and sinus pain.    Eyes: Negative for double vision, pain and redness.   Respiratory: Positive for cough. Negative for hemoptysis, sputum production, shortness of breath and wheezing.    Cardiovascular: Negative for chest pain and leg swelling.   Gastrointestinal: Negative for heartburn, nausea and vomiting.   Genitourinary: Negative for dysuria, frequency and urgency.   Musculoskeletal: Negative for myalgias.   Neurological: Positive for headaches. Negative for dizziness, tingling, tremors, speech change, seizures, loss of consciousness and weakness.   Endo/Heme/Allergies: Negative for polydipsia.   Psychiatric/Behavioral: Negative for hallucinations and memory loss.            Physical Examination:     /67   Pulse 58   Resp 17   Ht 1.626 m (5' 4\")   Wt 60.8 kg (134 lb)   SpO2 99%   BMI 23.00 kg/m       Physical Exam   Constitutional: She is oriented to person, place, and time. She appears well-developed and well-nourished.   HENT:   Head: Normocephalic and atraumatic.   Right Ear: External ear normal.   Left Ear: External ear normal.   Mouth/Throat: Oropharynx is clear and moist.   Eyes: EOM are normal. Pupils are equal, round, " and reactive to light.   Neck: Normal range of motion. Neck supple. No thyromegaly present.   Cardiovascular: Normal rate and regular rhythm.   Murmur heard.  Grade III Systolic murmur   Pulmonary/Chest: Effort normal and breath sounds normal. No stridor. No respiratory distress. She has no wheezes. She has no rales.   Abdominal: Soft. She exhibits no distension. There is no tenderness.   Musculoskeletal: Normal range of motion. She exhibits no edema or deformity.   Lymphadenopathy:     She has no cervical adenopathy.   Neurological: She is alert and oriented to person, place, and time.   Skin: Skin is warm and dry.   Nursing note and vitals reviewed.            Data:     CT Chest 3/20/2019  IMPRESSION: No new or suspicious pulmonary nodules. Stable nodular  opacity in the right major fissure, favors intrafissural lymph node.        Medications:     Current Outpatient Medications   Medication     acyclovir (ZOVIRAX) 400 MG tablet     albuterol (PROAIR HFA/PROVENTIL HFA/VENTOLIN HFA) 108 (90 Base) MCG/ACT inhaler     levonorgestrel (MIRENA) 20 MCG/24HR IUD     LORazepam (ATIVAN) 0.5 MG tablet     No current facility-administered medications for this visit.            The above note was dictated using voice recognition software and may include typographical errors. Please contact the author for any clarifications.        Surya Wallace MD

## 2019-03-20 NOTE — Clinical Note
"3/20/2019       RE: Letitia Yee  [address]  [place] [address]     Dear Colleague,    Thank you for referring your patient, Letitia Yee, to the [facility] at [facility]. Please see a copy of my visit note below.    Pulmonology Clinic Follow up Visit       Letitia Yee MRN# [id number]   YOB: 1970 Age: 48 year old     Date of Visit: 3/20/2019    Reason for Visit: ***          Assessment and Plan:             RTC: ***    Patient seen and discussed with  ***    Surya Wallace M.D.  Pulmonary & Critical Care Fellow  [phone]          History of Present Illness / Interval History:     Letitia Yee is a 48 year old female with H/O ***              Review of Systems:     Review of Systems   Constitutional: Negative for chills, fever and weight loss.   HENT: Positive for sore throat and tinnitus. Negative for ear discharge, ear pain, hearing loss, nosebleeds and sinus pain.    Eyes: Negative for double vision, pain and redness.   Respiratory: Positive for cough. Negative for hemoptysis, sputum production, shortness of breath and wheezing.    Neurological: Positive for headaches. Negative for dizziness, tingling, tremors, speech change, seizures, loss of consciousness and weakness.   Endo/Heme/Allergies: Negative for polydipsia.   Psychiatric/Behavioral: Negative for hallucinations and memory loss.            Physical Examination:     /67   Pulse 58   Resp 17   Ht 1.626 m (5' 4\")   Wt 60.8 kg (134 lb)   SpO2 99%   BMI 23.00 kg/m       Physical Exam  Fingernails without*** clubbing        Data:     PFT: ***    CXR:         Medications:     Current Outpatient Medications   Medication     acyclovir (ZOVIRAX) 400 MG tablet     albuterol (PROAIR HFA/PROVENTIL HFA/VENTOLIN HFA) 108 (90 Base) MCG/ACT inhaler     levonorgestrel (MIRENA) 20 MCG/24HR IUD     LORazepam (ATIVAN) " 0.5 MG tablet     No current facility-administered medications for this visit.                    Answers for HPI/ROS submitted by the patient on 3/19/2019   General Symptoms: Yes  Skin Symptoms: No  HENT Symptoms: Yes  EYE SYMPTOMS: Yes  HEART SYMPTOMS: No  LUNG SYMPTOMS: Yes  INTESTINAL SYMPTOMS: No  URINARY SYMPTOMS: No  GYNECOLOGIC SYMPTOMS: Yes  BREAST SYMPTOMS: No  SKELETAL SYMPTOMS: No  BLOOD SYMPTOMS: No  NERVOUS SYSTEM SYMPTOMS: Yes  MENTAL HEALTH SYMPTOMS: No  Loss of appetite: No  Weight gain: No  Fatigue: No  Night sweats: No  Increased stress: Yes  Excessive hunger: No  Feeling hot or cold when others believe the temperature is normal: No  Loss of height: No  Post-operative complications: No  Surgical site pain: No  Change in or Loss of Energy: No  Hyperactivity: No  Confusion: No  Congestion: Yes  Trouble swallowing: No   Voice hoarseness: Yes  Mouth sores: No  Tooth pain: No  Gum tenderness: Yes  Bleeding gums: No  Change in taste: No  Change in sense of smell: No  Dry mouth: No  Hearing aid used: No  Neck lump: No  Vision loss: No  Dry eyes: Yes  Watery eyes: No  Eye bulging: No  Flashing of lights: No  Spots: No  Floaters: No  Crossed eyes: No  Tunnel Vision: No  Yellowing of eyes: No  Eye irritation: No  Snoring: No  Difficulty breathing on exertion: Yes  Nighttime Cough: No  Difficulty breathing when lying flat: No  Trouble with coordination: No  Difficulty walking: No  Paralysis: No  Numbness: No  Bleeding or spotting between periods: Yes  Heavy or painful periods: No  Irregular periods: Yes  Vaginal discharge: No  Hot flashes: No  Vaginal dryness: No  Genital ulcers: No  Reduced libido: No  Painful intercourse: Yes  Difficulty with sexual arousal: No  Post-menopausal bleeding: No      Again, thank you for allowing me to participate in the care of your patient.      Sincerely,    Surya Wallace MD

## 2019-03-20 NOTE — PROGRESS NOTES
Pulmonology Clinic Follow up Visit       Letitia Yee MRN# [id number]   YOB: 1970 Age: 48 year old     Date of Visit: 3/20/2019    Reason for Visit: Follow up incidental pulmonary nodule          Assessment and Plan:     Letitia Yee is a 48 year old female with H/O sinus bradycardia and mitral valve prolapse who presents to follow up dyspnea on exertion and pulmonary nodule.      ## Pulmonary nodule, solitary, incidental  An 8mm solitary nodule was incidentally found in the major fissure of the right lung on the cardiac CT scan on 8/29/18. She is low risk for lung cancer. A repeat CT scan was performed today with no change to the known nodule and no new nodules.  Per Fleischner guidelines an additional CT scan could be considered at 24 months.  This was discussed with the patient and she declined additional imaging which I think is reasonable.         ## Intermittent chest tightness  ## Possible dyspnea on exertion     For more details of her history please see my note dated 9/5/2018.  To summarize, she has a history of mitral valve prolapse and developed dyspnea on exertion and was referred to us for further evaluation.  She was found to have normal pulmonary function studies, normal imaging, normal VQ scan, and a normal physical exam.  Cardiopulmonary stress test revealed possible diminished maximal heart rate.  Overall her symptoms do not appear to be pulmonary.  Since her last visit, her symptoms have remained stable.  I encouraged her to increase her aerobic exercise and continue to follow-up with cardiology.      RTC: PRN    Patient seen and discussed with Dr. Gissell Wallace M.D.  Pulmonary & Critical Care Fellow  [phone]      I saw and evaluated patient with Fellow.  Case discussed - agree with note.  I reviewed chest CT: right lung nodule in the major fissure looks stable; radiology report indicates it could be a lymph node.  DUNN improved and now minimal and  "stable.    DOMINIC CODY M.D.              History of Present Illness / Interval History:     Her dyspnea is overall unchanged.  She has no trouble with her ADLs or with moderate exercise, but with more aggressive exercise such as vigorous dancing or pushing a stuck car, she notices dyspnea as well as a cramping feeling in the middle of her chest.  The dyspnea is not associated with wheezes or coughing.  She has not had any fevers, chills, weight loss, or night sweats.          Review of Systems:     Review of Systems   Constitutional: Negative for chills, fever and weight loss.   HENT: Positive for sore throat and tinnitus. Negative for ear discharge, ear pain, hearing loss, nosebleeds and sinus pain.    Eyes: Negative for double vision, pain and redness.   Respiratory: Positive for cough. Negative for hemoptysis, sputum production, shortness of breath and wheezing.    Cardiovascular: Negative for chest pain and leg swelling.   Gastrointestinal: Negative for heartburn, nausea and vomiting.   Genitourinary: Negative for dysuria, frequency and urgency.   Musculoskeletal: Negative for myalgias.   Neurological: Positive for headaches. Negative for dizziness, tingling, tremors, speech change, seizures, loss of consciousness and weakness.   Endo/Heme/Allergies: Negative for polydipsia.   Psychiatric/Behavioral: Negative for hallucinations and memory loss.            Physical Examination:     /67   Pulse 58   Resp 17   Ht 1.626 m (5' 4\")   Wt 60.8 kg (134 lb)   SpO2 99%   BMI 23.00 kg/m      Physical Exam   Constitutional: She is oriented to person, place, and time. She appears well-developed and well-nourished.   HENT:   Head: Normocephalic and atraumatic.   Right Ear: External ear normal.   Left Ear: External ear normal.   Mouth/Throat: Oropharynx is clear and moist.   Eyes: EOM are normal. Pupils are equal, round, and reactive to light.   Neck: Normal range of motion. Neck supple. No thyromegaly present. "   Cardiovascular: Normal rate and regular rhythm.   Murmur heard.  Grade III Systolic murmur   Pulmonary/Chest: Effort normal and breath sounds normal. No stridor. No respiratory distress. She has no wheezes. She has no rales.   Abdominal: Soft. She exhibits no distension. There is no tenderness.   Musculoskeletal: Normal range of motion. She exhibits no edema or deformity.   Lymphadenopathy:     She has no cervical adenopathy.   Neurological: She is alert and oriented to person, place, and time.   Skin: Skin is warm and dry.   Nursing note and vitals reviewed.            Data:     CT Chest 3/20/2019  IMPRESSION: No new or suspicious pulmonary nodules. Stable nodular  opacity in the right major fissure, favors intrafissural lymph node.        Medications:     Current Outpatient Medications   Medication     acyclovir (ZOVIRAX) 400 MG tablet     albuterol (PROAIR HFA/PROVENTIL HFA/VENTOLIN HFA) 108 (90 Base) MCG/ACT inhaler     levonorgestrel (MIRENA) 20 MCG/24HR IUD     LORazepam (ATIVAN) 0.5 MG tablet     No current facility-administered medications for this visit.            The above note was dictated using voice recognition software and may include typographical errors. Please contact the author for any clarifications.

## 2019-08-12 ENCOUNTER — MYC MEDICAL ADVICE (OUTPATIENT)
Dept: FAMILY MEDICINE | Facility: CLINIC | Age: 49
End: 2019-08-12

## 2019-09-11 ENCOUNTER — OFFICE VISIT (OUTPATIENT)
Dept: FAMILY MEDICINE | Facility: CLINIC | Age: 49
End: 2019-09-11
Payer: COMMERCIAL

## 2019-09-11 VITALS
HEIGHT: 64 IN | WEIGHT: 138 LBS | DIASTOLIC BLOOD PRESSURE: 75 MMHG | SYSTOLIC BLOOD PRESSURE: 135 MMHG | BODY MASS INDEX: 23.56 KG/M2 | HEART RATE: 72 BPM | OXYGEN SATURATION: 100 % | RESPIRATION RATE: 16 BRPM | TEMPERATURE: 97.2 F

## 2019-09-11 DIAGNOSIS — G50.0 TRIGEMINAL NEURALGIA: ICD-10-CM

## 2019-09-11 DIAGNOSIS — G47.00 INSOMNIA, UNSPECIFIED TYPE: Primary | ICD-10-CM

## 2019-09-11 PROCEDURE — 99214 OFFICE O/P EST MOD 30 MIN: CPT | Performed by: FAMILY MEDICINE

## 2019-09-11 RX ORDER — LORAZEPAM 0.5 MG/1
0.5 TABLET ORAL
Qty: 30 TABLET | Refills: 1 | Status: SHIPPED | OUTPATIENT
Start: 2019-09-11 | End: 2020-04-06

## 2019-09-11 RX ORDER — HYDROXYZINE PAMOATE 25 MG/1
25-50 CAPSULE ORAL 3 TIMES DAILY PRN
Qty: 30 CAPSULE | Refills: 0 | Status: SHIPPED | OUTPATIENT
Start: 2019-09-11 | End: 2020-09-16

## 2019-09-11 RX ORDER — GABAPENTIN 300 MG/1
300 CAPSULE ORAL 3 TIMES DAILY PRN
Qty: 15 CAPSULE | Refills: 0 | Status: SHIPPED | OUTPATIENT
Start: 2019-09-11 | End: 2020-12-03

## 2019-09-11 ASSESSMENT — ANXIETY QUESTIONNAIRES
3. WORRYING TOO MUCH ABOUT DIFFERENT THINGS: NOT AT ALL
IF YOU CHECKED OFF ANY PROBLEMS ON THIS QUESTIONNAIRE, HOW DIFFICULT HAVE THESE PROBLEMS MADE IT FOR YOU TO DO YOUR WORK, TAKE CARE OF THINGS AT HOME, OR GET ALONG WITH OTHER PEOPLE: NOT DIFFICULT AT ALL
5. BEING SO RESTLESS THAT IT IS HARD TO SIT STILL: NOT AT ALL
GAD7 TOTAL SCORE: 0
2. NOT BEING ABLE TO STOP OR CONTROL WORRYING: NOT AT ALL
6. BECOMING EASILY ANNOYED OR IRRITABLE: NOT AT ALL
1. FEELING NERVOUS, ANXIOUS, OR ON EDGE: NOT AT ALL
7. FEELING AFRAID AS IF SOMETHING AWFUL MIGHT HAPPEN: NOT AT ALL

## 2019-09-11 ASSESSMENT — MIFFLIN-ST. JEOR: SCORE: 1235.96

## 2019-09-11 ASSESSMENT — PATIENT HEALTH QUESTIONNAIRE - PHQ9: 5. POOR APPETITE OR OVEREATING: NOT AT ALL

## 2019-09-11 NOTE — NURSING NOTE
"Chief Complaint   Patient presents with     Anxiety     /75   Pulse 72   Temp 97.2  F (36.2  C) (Oral)   Resp 16   Ht 1.626 m (5' 4\")   Wt 62.6 kg (138 lb)   SpO2 100%   BMI 23.69 kg/m   Estimated body mass index is 23.69 kg/m  as calculated from the following:    Height as of this encounter: 1.626 m (5' 4\").    Weight as of this encounter: 62.6 kg (138 lb).  bp completed using cuff size: large       Health Maintenance addressed:  ACT    Possibly completing today per provider review.    Olivia Che, CMA, MA     "

## 2019-09-11 NOTE — PROGRESS NOTES
"Subjective     Letitia Yee is a 48 year old female who presents to clinic today for the following health issues:    HPI   Anxiety Follow-Up    How are you doing with your anxiety since your last visit? Improved \" a little better\"    Are you having other symptoms that might be associated with anxiety? No    Have you had a significant life event? Health Concerns and OTHER: mother moved in with her      Are you feeling depressed? No    Do you have any concerns with your use of alcohol or other drugs? No    Social History     Tobacco Use     Smoking status: Former Smoker     Packs/day: 1.00     Years: 3.00     Pack years: 3.00     Types: Cigarettes     Start date:      Last attempt to quit:      Years since quittin.7     Smokeless tobacco: Never Used   Substance Use Topics     Alcohol use: Yes     Comment: Socially     Drug use: No     JOSE-7 SCORE 2018   Total Score 7 0     How many servings of fruits and vegetables do you eat daily?  2-3    On average, how many sweetened beverages do you drink each day (soda, juice, sweet tea, etc)?   2    How many days per week do you miss taking your medication? 0    Also, the patient has been complaining about jaw pain. Right sided.       Patient Active Problem List   Diagnosis     H/O mitral valve prolapse     Hordeolum externum of right lower eyelid     Migraine with aura and without status migrainosus, not intractable     Adjustment disorder with anxious mood     Insomnia, unspecified type     No past surgical history on file.    Social History     Tobacco Use     Smoking status: Former Smoker     Packs/day: 1.00     Years: 3.00     Pack years: 3.00     Types: Cigarettes     Start date:      Last attempt to quit:      Years since quittin.7     Smokeless tobacco: Never Used   Substance Use Topics     Alcohol use: Yes     Comment: Socially     Family History   Problem Relation Age of Onset     Arthritis Mother      Depression Mother " "     Genitourinary Problems Mother      Heart Defect Father      Hypertension Father      Hyperlipidemia Father      Alcoholism Sister      Depression Sister      Mental Illness Sister      Alcoholism Brother      Depression Brother      Asthma Maternal Grandfather            Reviewed and updated as needed this visit by Provider         Review of Systems   ROS COMP: Constitutional, HEENT, cardiovascular, pulmonary, gi and gu systems are negative, except as otherwise noted.      Objective    /75   Pulse 72   Temp 97.2  F (36.2  C) (Oral)   Resp 16   Ht 1.626 m (5' 4\")   Wt 62.6 kg (138 lb)   SpO2 100%   BMI 23.69 kg/m    Body mass index is 23.69 kg/m .  Physical Exam   GENERAL: healthy, alert and no distress  RESP: lungs clear to auscultation - no rales, rhonchi or wheezes  CV: regular rate and rhythm, normal S1 S2, no S3 or S4, no murmur, click or rub, no peripheral edema and peripheral pulses strong  PSYCH: mentation appears normal, affect normal/bright    Diagnostic Test Results:  Labs reviewed in Epic        Assessment & Plan       ICD-10-CM    1. Insomnia, unspecified type G47.00 LORazepam (ATIVAN) 0.5 MG tablet     hydrOXYzine (VISTARIL) 25 MG capsule   2. Trigeminal neuralgia G50.0 gabapentin (NEURONTIN) 300 MG capsule       Return in about 6 months (around 3/11/2020) for Follow-up visit.    Shayan Warren MD  [facility]    "

## 2019-09-12 ASSESSMENT — ASTHMA QUESTIONNAIRES: ACT_TOTALSCORE: 22

## 2019-09-24 ASSESSMENT — ANXIETY QUESTIONNAIRES: GAD7 TOTAL SCORE: 0

## 2019-09-30 ENCOUNTER — MYC MEDICAL ADVICE (OUTPATIENT)
Dept: FAMILY MEDICINE | Facility: CLINIC | Age: 49
End: 2019-09-30

## 2019-09-30 ENCOUNTER — E-VISIT (OUTPATIENT)
Dept: FAMILY MEDICINE | Facility: CLINIC | Age: 49
End: 2019-09-30
Payer: COMMERCIAL

## 2019-09-30 DIAGNOSIS — Z20.7 SCABIES EXPOSURE: ICD-10-CM

## 2019-09-30 DIAGNOSIS — R23.4 SCAB: Primary | ICD-10-CM

## 2019-09-30 DIAGNOSIS — B86 SCABIES: Primary | ICD-10-CM

## 2019-09-30 PROCEDURE — 99444 ZZC PHYSICIAN ONLINE EVALUATION & MANAGEMENT SERVICE: CPT | Performed by: FAMILY MEDICINE

## 2019-09-30 RX ORDER — PERMETHRIN 50 MG/G
CREAM TOPICAL ONCE
Qty: 60 G | Refills: 0 | Status: SHIPPED | OUTPATIENT
Start: 2019-09-30 | End: 2019-09-30

## 2019-09-30 NOTE — PATIENT INSTRUCTIONS
Patient Education     Scabies  Scabies is a skin infection. It is caused by a tiny parasitic insect, or mite, that is too small to see directly. It can be seen under a microscope, but it is usually recognized only by the rash and symptoms it causes. This can make it hard to diagnose since the signs and symptoms can be similar to other diseases.  The scabies mite tunnels under the skin. It creates a small burrow, where it leaves its eggs. These eggs noe and grow into adults. They then create new burrows over the next 1 to 2 weeks. The mites die in about 4 to 6 weeks. The rash and itching are caused by an allergic reaction to the scabies saliva or feces.  Scabies is highly contagious. It is spread by direct skin contact. It is easily spread by close personal contact, sexual contact, or by sharing bed linens or clothing used by an infected person.  It may take 4 to 6 weeks for symptoms to appear after being exposed. Everyone living in the house with you, as well as your sexual partners, should be treated at the same time. After the first treatment, you will no longer be contagious. You may return to work, school or .  Home care    Machine wash in hot water all sheets, towels, pillowcases, underwear, pajamas, and any other clothing you have worn lately. Use the hot cycle of a dryer or use a hot iron to sterilize.    Seal anything that is hard to wash in a plastic trash bag for 4 days. This includes coats, jackets, blankets, and bedspreads. (The insects die after 3 days off the human body.)  Medicines  Scabicides  Medicines used to treat scabies are called scabicides. These are creams that kill the scabies mites. A prescription is needed. When using these medicines:    Always follow instructions provided by your healthcare provider and pharmacist. Also follow the printed instructions that come with the medicine.    Talk with your provider about precautions to take when using these medicines.    Use the cream  on your body when your skin is cool and dry. Don t use it after a hot shower or bath.    Usually the cream is put on your whole body. This means from your chin all the way down to your toes. Scabies does not usually affect an adult s head. So cream is not needed there. For children, discuss this with your child s provider.    Leave the cream or lotion on for the recommended amount of time. This is usually 8 to 12 hours.    Don t leave cream or lotion on your skin longer than directed. Don t use more than recommended.    Clean clothes should be worn after the treatment.    If you wash your hands after using the cream, you will need to reapply the cream to your hands.    If you are breastfeeding, wash off your nipples before feeding. Then reapply the cream after breastfeeding.    For babies or infants, put mittens on their hands. This will stop them from licking the cream or lotion. It will also stop them from scratching themselves because of the itching.  Other medicines    An oral medicine called ivermectin may be prescribed for severe cases. It may also be used if you can t apply creams.    Itching may cause the most discomfort. If large areas of your skin are affected, over-the-counter antihistamines may be used to reduce itching. Or you may be given a prescription antihistamine. Some of these medicines may make you sleepy. They are best used at bedtime. Antihistamines that don t make you sleepy can be used during the day. Note: Don t use medicine that has diphenhydramine if you have glaucoma, or if you are a man who has trouble urinating due to an enlarged prostate.    If you were given antibiotics due to a bacterial infection, take them until they are finished. It is important to finish the antibiotics even if the wound looks better. This is to make sure the infection has cleared.  Follow-up care  Follow up with your healthcare provider, or as advised. Call your provider if your symptoms don t improve after 1  week, or if new burrows or rashes appear.  When to seek medical advice  Call your healthcare provider right away if any of these occur:    Yellow-brown crusts or drainage from the sores    Other signs of infection, including increasing redness, swelling, pain, or pus    Fever of 100.4 F (38 C) or higher, or as directed by your provider  Date Last Reviewed: 8/1/2016 2000-2018 [employer]. [address] [place] [address], [place] [address]. All rights reserved. This information is not intended as a substitute for professional medical care. Always follow your healthcare professional's instructions.

## 2019-10-01 NOTE — TELEPHONE ENCOUNTER
FS    Please see [email or web address] message below.    How many days is patient suppose to use cream?    Thanks,  Jelena Camejo RN

## 2019-10-02 NOTE — TELEPHONE ENCOUNTER
Frank Dong  Permethrin cream 5% is applied topically x 1 and repeating the dose 2 weeks later.   If you are not noticing any improvement and still itching after the first treatment, I can send in an extended treatment course:  - Once daily x 7 days then 2 times per week until cured.     Briana

## 2019-10-03 ENCOUNTER — HEALTH MAINTENANCE LETTER (OUTPATIENT)
Age: 49
End: 2019-10-03

## 2019-10-03 RX ORDER — PERMETHRIN 50 MG/G
CREAM TOPICAL
Qty: 60 G | Refills: 3 | Status: SHIPPED | OUTPATIENT
Start: 2019-10-03 | End: 2019-11-18

## 2019-11-14 ENCOUNTER — MYC MEDICAL ADVICE (OUTPATIENT)
Dept: FAMILY MEDICINE | Facility: CLINIC | Age: 49
End: 2019-11-14

## 2019-11-14 NOTE — TELEPHONE ENCOUNTER
Scheduled patient for her physical with A.S on Monday 11/18 at 8:20 am.  Waiting for confirmation from PATRICIA Camejo RN     pain, shoulder

## 2019-11-18 ENCOUNTER — OFFICE VISIT (OUTPATIENT)
Dept: FAMILY MEDICINE | Facility: CLINIC | Age: 49
End: 2019-11-18
Payer: COMMERCIAL

## 2019-11-18 VITALS
OXYGEN SATURATION: 98 % | RESPIRATION RATE: 16 BRPM | HEIGHT: 64 IN | WEIGHT: 137 LBS | DIASTOLIC BLOOD PRESSURE: 70 MMHG | TEMPERATURE: 97.5 F | BODY MASS INDEX: 23.39 KG/M2 | SYSTOLIC BLOOD PRESSURE: 119 MMHG | HEART RATE: 64 BPM

## 2019-11-18 DIAGNOSIS — Z12.4 SCREENING FOR MALIGNANT NEOPLASM OF CERVIX: ICD-10-CM

## 2019-11-18 DIAGNOSIS — Z12.11 ENCOUNTER FOR SCREENING FOR MALIGNANT NEOPLASM OF COLON: ICD-10-CM

## 2019-11-18 DIAGNOSIS — Z12.39 ENCOUNTER FOR SCREENING FOR MALIGNANT NEOPLASM OF BREAST: ICD-10-CM

## 2019-11-18 DIAGNOSIS — Z00.00 ROUTINE GENERAL MEDICAL EXAMINATION AT A HEALTH CARE FACILITY: Primary | ICD-10-CM

## 2019-11-18 LAB
ALBUMIN SERPL-MCNC: 3.7 G/DL (ref 3.4–5)
ALP SERPL-CCNC: 37 U/L (ref 40–150)
ALT SERPL W P-5'-P-CCNC: 19 U/L (ref 0–50)
ANION GAP SERPL CALCULATED.3IONS-SCNC: 7 MMOL/L (ref 3–14)
AST SERPL W P-5'-P-CCNC: 18 U/L (ref 0–45)
BILIRUB SERPL-MCNC: 0.8 MG/DL (ref 0.2–1.3)
BUN SERPL-MCNC: 11 MG/DL (ref 7–30)
CALCIUM SERPL-MCNC: 8.9 MG/DL (ref 8.5–10.1)
CHLORIDE SERPL-SCNC: 106 MMOL/L (ref 94–109)
CHOLEST SERPL-MCNC: 162 MG/DL
CO2 SERPL-SCNC: 25 MMOL/L (ref 20–32)
CREAT SERPL-MCNC: 0.75 MG/DL (ref 0.52–1.04)
ERYTHROCYTE [DISTWIDTH] IN BLOOD BY AUTOMATED COUNT: 11.7 % (ref 10–15)
FERRITIN SERPL-MCNC: 38 NG/ML (ref 8–252)
FOLATE SERPL-MCNC: 7.6 NG/ML
GFR SERPL CREATININE-BSD FRML MDRD: >90 ML/MIN/{1.73_M2}
GLUCOSE SERPL-MCNC: 78 MG/DL (ref 70–99)
HCT VFR BLD AUTO: 37.4 % (ref 35–47)
HDLC SERPL-MCNC: 60 MG/DL
HGB BLD-MCNC: 12.8 G/DL (ref 11.7–15.7)
HIV 1+2 AB+HIV1 P24 AG SERPL QL IA: NONREACTIVE
LDLC SERPL CALC-MCNC: 91 MG/DL
MCH RBC QN AUTO: 31.8 PG (ref 26.5–33)
MCHC RBC AUTO-ENTMCNC: 34.2 G/DL (ref 31.5–36.5)
MCV RBC AUTO: 93 FL (ref 78–100)
NONHDLC SERPL-MCNC: 102 MG/DL
PLATELET # BLD AUTO: 185 10E9/L (ref 150–450)
POTASSIUM SERPL-SCNC: 3.7 MMOL/L (ref 3.4–5.3)
PROT SERPL-MCNC: 6.6 G/DL (ref 6.8–8.8)
RBC # BLD AUTO: 4.03 10E12/L (ref 3.8–5.2)
SODIUM SERPL-SCNC: 138 MMOL/L (ref 133–144)
TRIGL SERPL-MCNC: 57 MG/DL
VIT B12 SERPL-MCNC: 994 PG/ML (ref 193–986)
WBC # BLD AUTO: 4.1 10E9/L (ref 4–11)

## 2019-11-18 PROCEDURE — 80061 LIPID PANEL: CPT | Performed by: FAMILY MEDICINE

## 2019-11-18 PROCEDURE — 80053 COMPREHEN METABOLIC PANEL: CPT | Performed by: FAMILY MEDICINE

## 2019-11-18 PROCEDURE — G0145 SCR C/V CYTO,THINLAYER,RESCR: HCPCS | Performed by: FAMILY MEDICINE

## 2019-11-18 PROCEDURE — 90715 TDAP VACCINE 7 YRS/> IM: CPT | Performed by: FAMILY MEDICINE

## 2019-11-18 PROCEDURE — 82746 ASSAY OF FOLIC ACID SERUM: CPT | Performed by: FAMILY MEDICINE

## 2019-11-18 PROCEDURE — 90471 IMMUNIZATION ADMIN: CPT | Performed by: FAMILY MEDICINE

## 2019-11-18 PROCEDURE — 99396 PREV VISIT EST AGE 40-64: CPT | Mod: 25 | Performed by: FAMILY MEDICINE

## 2019-11-18 PROCEDURE — 87389 HIV-1 AG W/HIV-1&-2 AB AG IA: CPT | Performed by: FAMILY MEDICINE

## 2019-11-18 PROCEDURE — 85027 COMPLETE CBC AUTOMATED: CPT | Performed by: FAMILY MEDICINE

## 2019-11-18 PROCEDURE — 82728 ASSAY OF FERRITIN: CPT | Performed by: FAMILY MEDICINE

## 2019-11-18 PROCEDURE — G0476 HPV COMBO ASSAY CA SCREEN: HCPCS | Performed by: FAMILY MEDICINE

## 2019-11-18 PROCEDURE — 82607 VITAMIN B-12: CPT | Performed by: FAMILY MEDICINE

## 2019-11-18 PROCEDURE — 36415 COLL VENOUS BLD VENIPUNCTURE: CPT | Performed by: FAMILY MEDICINE

## 2019-11-18 RX ORDER — LANOLIN ALCOHOL/MO/W.PET/CERES
CREAM (GRAM) TOPICAL DAILY
COMMUNITY
End: 2023-04-06

## 2019-11-18 ASSESSMENT — MIFFLIN-ST. JEOR: SCORE: 1226.43

## 2019-11-18 NOTE — PATIENT INSTRUCTIONS
Preventive Health Recommendations  Female Ages 50 - 64    Yearly exam: See your health care provider every year in order to  o Review health changes.   o Discuss preventive care.    o Review your medicines if your doctor has prescribed any.      Get a Pap test every three years (unless you have an abnormal result and your provider advises testing more often).    If you get Pap tests with HPV test, you only need to test every 5 years, unless you have an abnormal result.     You do not need a Pap test if your uterus was removed (hysterectomy) and you have not had cancer.    You should be tested each year for STDs (sexually transmitted diseases) if you're at risk.     Have a mammogram every 1 to 2 years.    Have a colonoscopy at age 50, or have a yearly FIT test (stool test). These exams screen for colon cancer.      Have a cholesterol test every 5 years, or more often if advised.    Have a diabetes test (fasting glucose) every three years. If you are at risk for diabetes, you should have this test more often.     If you are at risk for osteoporosis (brittle bone disease), think about having a bone density scan (DEXA).    Shots: Get a flu shot each year. Get a tetanus shot every 10 years.    Nutrition:     Eat at least 5 servings of fruits and vegetables each day.    Eat whole-grain bread, whole-wheat pasta and brown rice instead of white grains and rice.    Get adequate Calcium and Vitamin D.     Lifestyle    Exercise at least 150 minutes a week (30 minutes a day, 5 days a week). This will help you control your weight and prevent disease.    Limit alcohol to one drink per day.    No smoking.     Wear sunscreen to prevent skin cancer.     See your dentist every six months for an exam and cleaning.    See your eye doctor every 1 to 2 years.    PLEASE CALL TO SCHEDULE YOUR MAMMOGRAM  [facility] [phone]  [facility] [phone]  [facility]   [phone]  [place]  Scheduling (all locations) [phone]    If you are under/uninsured, we recommend you contact the [facility]. They offer mammograms on a sliding fee charge. You can schedule with them at [phone].

## 2019-11-18 NOTE — NURSING NOTE
Prior to immunization administration, verified patients identity using patient s name and date of birth. Please see Immunization Activity for additional information.     Screening Questionnaire for Adult Immunization    Are you sick today?   No   Do you have allergies to medications, food, a vaccine component or latex?   No   Have you ever had a serious reaction after receiving a vaccination?   No   Do you have a long-term health problem with heart disease, lung disease, asthma, kidney disease, metabolic disease (e.g. diabetes), anemia, or other blood disorder?   No   Do you have cancer, leukemia, HIV/AIDS, or any other immune system problem?   No   In the past 3 months, have you taken medications that affect  your immune system, such as prednisone, other steroids, or anticancer drugs; drugs for the treatment of rheumatoid arthritis, Crohn s disease, or psoriasis; or have you had radiation treatments?   No   Have you had a seizure, or a brain or other nervous system problem?   No   During the past year, have you received a transfusion of blood or blood     products, or been given immune (gamma) globulin or antiviral drug?   No   For women: Are you pregnant or is there a chance you could become        pregnant during the next month?   No   Have you received any vaccinations in the past 4 weeks?   No     Immunization questionnaire answers were all negative.        Per orders of Dr. Warren, injection of TDAP given by Olivia Che CMA. Patient instructed to remain in clinic for 15 minutes afterwards, and to report any adverse reaction to me immediately.       Screening performed by Olivia Che CMA on 11/18/2019 at 9:40 AM.

## 2019-11-18 NOTE — PROGRESS NOTES
"   SUBJECTIVE:   CC: Letitia Yee is an 49 year old woman who presents for preventive health visit.     Healthy Habits:     Getting at least 3 servings of Calcium per day:  NO    Bi-annual eye exam:  NO    Dental care twice a year:  Yes    Sleep apnea or symptoms of sleep apnea:  None    Diet:  Vegetarian/vegan    Frequency of exercise:  4-5 days/week    Duration of exercise:  30-45 minutes    Taking medications regularly:  Yes    Medication side effects:  None    PHQ-2 Total Score: 1    Additional concerns today:  Yes    Patient would like to have \"some sort of full blood count\" states she has been fatigued for two years.     Today's PHQ-2 Score:   PHQ-2 (  [employer]) 2019   Q1: Little interest or pleasure in doing things 0   Q2: Feeling down, depressed or hopeless 1   PHQ-2 Score 1   Q1: Little interest or pleasure in doing things Not at all   Q2: Feeling down, depressed or hopeless Several days   PHQ-2 Score 1       Abuse: Current or Past(Physical, Sexual or Emotional)- No  Do you feel safe in your environment? Yes        Social History     Tobacco Use     Smoking status: Former Smoker     Packs/day: 1.00     Years: 3.00     Pack years: 3.00     Types: Cigarettes     Start date:      Last attempt to quit:      Years since quittin.8     Smokeless tobacco: Never Used   Substance Use Topics     Alcohol use: Yes     Comment: Socially     If you drink alcohol do you typically have >3 drinks per day or >7 drinks per week? No    Alcohol Use 2019   Prescreen: >3 drinks/day or >7 drinks/week? No   No flowsheet data found.    Reviewed orders with patient.  Reviewed health maintenance and updated orders accordingly - Yes  Lab work is in process    Mammogram Screening: Patient over age 50, mutual decision to screen reflected in health maintenance.    Pertinent mammograms are reviewed under the imaging tab.  History of abnormal Pap smear: NO - age 30-65 PAP every 5 years with negative HPV " "co-testing recommended     Reviewed and updated as needed this visit by clinical staff  Tobacco         Reviewed and updated as needed this visit by Provider            Review of Systems  CONSTITUTIONAL: NEGATIVE for fever, chills, change in weight  INTEGUMENTARU/SKIN: NEGATIVE for worrisome rashes, moles or lesions  EYES: NEGATIVE for vision changes or irritation  ENT: NEGATIVE for ear, mouth and throat problems  RESP: NEGATIVE for significant cough or SOB  BREAST: NEGATIVE for masses, tenderness or discharge  CV: NEGATIVE for chest pain, palpitations or peripheral edema  GI: NEGATIVE for nausea, abdominal pain, heartburn, or change in bowel habits  : NEGATIVE for unusual urinary or vaginal symptoms. Periods are regular.  MUSCULOSKELETAL: NEGATIVE for significant arthralgias or myalgia  NEURO: NEGATIVE for weakness, dizziness or paresthesias  PSYCHIATRIC: NEGATIVE for changes in mood or affect     OBJECTIVE:   /70   Pulse 64   Resp 16   Ht 1.626 m (5' 4\")   Wt 62.1 kg (137 lb)   SpO2 98%   BMI 23.52 kg/m    Physical Exam  GENERAL: healthy, alert and no distress  EYES: Eyes grossly normal to inspection, PERRL and conjunctivae and sclerae normal  HENT: ear canals and TM's normal, nose and mouth without ulcers or lesions  NECK: no adenopathy, no asymmetry, masses, or scars and thyroid normal to palpation  RESP: lungs clear to auscultation - no rales, rhonchi or wheezes  BREAST: normal without masses, tenderness or nipple discharge and no palpable axillary masses or adenopathy  CV: regular rate and rhythm, normal S1 S2, no S3 or S4, no murmur, click or rub, no peripheral edema and peripheral pulses strong  ABDOMEN: soft, nontender, no hepatosplenomegaly, no masses and bowel sounds normal   (female): normal female external genitalia, normal urethral meatus, vaginal mucosa pink, moist, well rugated, and normal cervix/adnexa/uterus without masses or discharge  MS: no gross musculoskeletal defects noted, " "no edema  SKIN: no suspicious lesions or rashes  NEURO: Normal strength and tone, mentation intact and speech normal  PSYCH: mentation appears normal, affect normal/bright    Diagnostic Test Results:  Labs reviewed in Epic  No results found for this or any previous visit (from the past 24 hour(s)).    ASSESSMENT/PLAN:       ICD-10-CM    1. Routine general medical examination at a health care facility Z00.00 HIV Screening     Lipid panel reflex to direct LDL Fasting     VACCINE ADMINISTRATION, INITIAL     ADMIN VACCINE, ADDL     TDAP VACCINE (ADACEL)     Comprehensive metabolic panel     CBC with platelets     Vitamin B12     Folate     Ferritin   2. Encounter for screening for malignant neoplasm of colon Z12.11 GASTROENTEROLOGY ADULT REF PROCEDURE ONLY [facility] [phone]; No Provider Preference     CANCELED: GASTROENTEROLOGY ADULT REF PROCEDURE ONLY   3. Encounter for screening for malignant neoplasm of breast Z12.39 MA SCREENING DIGITAL BILAT - Future  (s+30)   4. Screening for malignant neoplasm of cervix Z12.4 Pap imaged thin layer screen with HPV - recommended age 30 - 65 years (select HPV order below)     HPV High Risk Types DNA Cervical       COUNSELING:  Reviewed preventive health counseling, as reflected in patient instructions    Estimated body mass index is 23.52 kg/m  as calculated from the following:    Height as of this encounter: 1.626 m (5' 4\").    Weight as of this encounter: 62.1 kg (137 lb).         reports that she quit smoking about 27 years ago. Her smoking use included cigarettes. She started smoking about 30 years ago. She has a 3.00 pack-year smoking history. She has never used smokeless tobacco.      Counseling Resources:  ATP IV Guidelines  Pooled Cohorts Equation Calculator  Breast Cancer Risk Calculator  FRAX Risk Assessment  ICSI Preventive Guidelines  Dietary Guidelines for Americans, 2010  USDA's MyPlate  ASA Prophylaxis  Lung CA Screening    Shayan Warren, " MD  [facility]

## 2019-11-20 ENCOUNTER — MYC MEDICAL ADVICE (OUTPATIENT)
Dept: FAMILY MEDICINE | Facility: CLINIC | Age: 49
End: 2019-11-20

## 2019-11-20 LAB
COPATH REPORT: NORMAL
PAP: NORMAL

## 2019-11-20 NOTE — TELEPHONE ENCOUNTER
Dear Letitia,   - Normal range for vitamin B12 is between 193-989. Anything above 989 means that you are taking too much. Vitamin B12 will differ from one person to another. Some will be in the higher end of normal, others will be in the lower end of normal. It is effected by your diet and your genetics.   - Your protein level is at 6.6 and the normal is 6.8-8.8. lower number is worrisome for kidney disease, liver disease or a diet low in proteins. In your case, your liver and kidney function are normal. It is likely related to your diet. Nutritional supplement or adjusting your diet to include more proteins will bring it up.     Your iron level is normal. There is no need to take any additional supplement. If you have some at home and you are already taking it, you may continue to take it. You may take 1 tablet every other day. Do not take more than 3 tablets per week.     I hope this helps.   Shayan Warren MD

## 2019-11-20 NOTE — RESULT ENCOUNTER NOTE
Dear Letitia,   Here are your recent results. All labs are within expected except for a mild increase in your vitamin B 12. Please decrease the frequency of Vitamin B to less than 4 times per week.     Best regards,  Shayan Warren MD

## 2019-11-21 LAB
FINAL DIAGNOSIS: NORMAL
HPV HR 12 DNA CVX QL NAA+PROBE: NEGATIVE
HPV16 DNA SPEC QL NAA+PROBE: NEGATIVE
HPV18 DNA SPEC QL NAA+PROBE: NEGATIVE
SPECIMEN DESCRIPTION: NORMAL
SPECIMEN SOURCE CVX/VAG CYTO: NORMAL

## 2020-01-30 ENCOUNTER — HOSPITAL ENCOUNTER (OUTPATIENT)
Facility: CLINIC | Age: 50
Discharge: HOME | End: 2020-01-30
Attending: INTERNAL MEDICINE | Admitting: INTERNAL MEDICINE
Payer: COMMERCIAL

## 2020-01-30 VITALS
HEIGHT: 64 IN | HEART RATE: 74 BPM | DIASTOLIC BLOOD PRESSURE: 86 MMHG | WEIGHT: 134 LBS | RESPIRATION RATE: 16 BRPM | OXYGEN SATURATION: 95 % | BODY MASS INDEX: 22.88 KG/M2 | SYSTOLIC BLOOD PRESSURE: 116 MMHG

## 2020-01-30 LAB — COLONOSCOPY: NORMAL

## 2020-01-30 PROCEDURE — G0500 MOD SEDAT ENDO SERVICE >5YRS: HCPCS | Performed by: INTERNAL MEDICINE

## 2020-01-30 PROCEDURE — 45378 DIAGNOSTIC COLONOSCOPY: CPT | Performed by: INTERNAL MEDICINE

## 2020-01-30 PROCEDURE — 25000128 H RX IP 250 OP 636: Performed by: INTERNAL MEDICINE

## 2020-01-30 PROCEDURE — G0121 COLON CA SCRN NOT HI RSK IND: HCPCS | Performed by: INTERNAL MEDICINE

## 2020-01-30 RX ORDER — ONDANSETRON 4 MG/1
4 TABLET, ORALLY DISINTEGRATING ORAL EVERY 6 HOURS PRN
Status: DISCONTINUED | OUTPATIENT
Start: 2020-01-30 | End: 2020-01-30 | Stop reason: HOSPADM

## 2020-01-30 RX ORDER — FLUMAZENIL 0.1 MG/ML
0.2 INJECTION, SOLUTION INTRAVENOUS
Status: DISCONTINUED | OUTPATIENT
Start: 2020-01-30 | End: 2020-01-30 | Stop reason: HOSPADM

## 2020-01-30 RX ORDER — NALOXONE HYDROCHLORIDE 0.4 MG/ML
.1-.4 INJECTION, SOLUTION INTRAMUSCULAR; INTRAVENOUS; SUBCUTANEOUS
Status: DISCONTINUED | OUTPATIENT
Start: 2020-01-30 | End: 2020-01-30 | Stop reason: HOSPADM

## 2020-01-30 RX ORDER — FENTANYL CITRATE 50 UG/ML
INJECTION, SOLUTION INTRAMUSCULAR; INTRAVENOUS PRN
Status: DISCONTINUED | OUTPATIENT
Start: 2020-01-30 | End: 2020-01-30 | Stop reason: HOSPADM

## 2020-01-30 RX ORDER — ONDANSETRON 2 MG/ML
4 INJECTION INTRAMUSCULAR; INTRAVENOUS EVERY 6 HOURS PRN
Status: DISCONTINUED | OUTPATIENT
Start: 2020-01-30 | End: 2020-01-30 | Stop reason: HOSPADM

## 2020-01-30 RX ORDER — ONDANSETRON 2 MG/ML
4 INJECTION INTRAMUSCULAR; INTRAVENOUS
Status: DISCONTINUED | OUTPATIENT
Start: 2020-01-30 | End: 2020-01-30 | Stop reason: HOSPADM

## 2020-01-30 RX ORDER — LIDOCAINE 40 MG/G
CREAM TOPICAL
Status: DISCONTINUED | OUTPATIENT
Start: 2020-01-30 | End: 2020-01-30 | Stop reason: HOSPADM

## 2020-01-30 ASSESSMENT — MIFFLIN-ST. JEOR: SCORE: 1212.82

## 2020-01-31 ENCOUNTER — TRANSFERRED RECORDS (OUTPATIENT)
Dept: HEALTH INFORMATION MANAGEMENT | Facility: CLINIC | Age: 50
End: 2020-01-31

## 2020-02-08 ENCOUNTER — HEALTH MAINTENANCE LETTER (OUTPATIENT)
Age: 50
End: 2020-02-08

## 2020-05-11 ENCOUNTER — MYC MEDICAL ADVICE (OUTPATIENT)
Dept: CARDIOLOGY | Facility: CLINIC | Age: 50
End: 2020-05-11

## 2020-05-19 ENCOUNTER — TELEPHONE (OUTPATIENT)
Dept: FAMILY MEDICINE | Facility: CLINIC | Age: 50
End: 2020-05-19

## 2020-05-19 NOTE — TELEPHONE ENCOUNTER
Summary:    Patient is due/failing the following:   Flu vaccine    Type of outreach:  none  Action needed: flu abstracted     If need for provider review:    Please indicate OV, lab, MTM, or nurse appt if needed.  Indicate fasting or not fasting.                                                                                                                                          Ashlyn Arce MA

## 2020-07-01 ENCOUNTER — TELEPHONE (OUTPATIENT)
Dept: CARDIOLOGY | Facility: CLINIC | Age: 50
End: 2020-07-01

## 2020-07-01 NOTE — TELEPHONE ENCOUNTER
Placido Muir Bullis Mary, RN             Hi,   This pt called wanting to do follow up with Dr. Solis PH - she states that she thought she had to do ECHO or test before talking to Will. Please review and let me know what to schedule fo rthis pt last seen 2018.   Thank you,   Vivienne      ===============    Last seen 8/2018 for MR and Mitral valve prolapse  with plan for 6 month return with labs and echo. Orders are two years old. Message to DR Solis for clarification of plan.  Lyla Kendall RN 07/01/20 10:54 AM    ==============    Kelli Solis MD  You  7/9/2020 (1:43 PM)       Carl Arita    She needs a complete transthoracic echocardiogram for assessment of mitral valve regurgitation, ECG, and in clinic visit (non-urgent visit).    30 minutes will suffice.    Thank you.    Dr. Solis         =======  Update to Scheduling and orders placed.  Lyla Kendall RN 07/09/2020 2:30 PM

## 2020-07-02 ENCOUNTER — TELEPHONE (OUTPATIENT)
Dept: FAMILY MEDICINE | Facility: CLINIC | Age: 50
End: 2020-07-02

## 2020-07-02 NOTE — TELEPHONE ENCOUNTER
Summary:    Patient is due/failing the following:   MAMMOGRAM    Type of outreach:  Sent [employer] message.  Action needed: Patient needs office visit for mammo.    If need for provider review:    Please indicate OV, lab, MTM, or nurse appt if needed.  Indicate fasting or not fasting.                                                                                                                                          Ashlyn Arce MA

## 2020-07-09 DIAGNOSIS — I34.0 MITRAL VALVE REGURGITATION: Primary | ICD-10-CM

## 2020-07-13 ENCOUNTER — TELEPHONE (OUTPATIENT)
Dept: FAMILY MEDICINE | Facility: CLINIC | Age: 50
End: 2020-07-13

## 2020-07-13 NOTE — TELEPHONE ENCOUNTER
Summary:    Patient is due/failing the following:   ACT    Type of outreach:  Sent [employer] message.  Action needed: Patient needs to do ACT.    If need for provider review:    Please indicate OV, lab, MTM, or nurse appt if needed.  Indicate fasting or not fasting.                                                                                                                                          Ashlyn Arce MA

## 2020-07-27 ENCOUNTER — TELEPHONE (OUTPATIENT)
Dept: CARDIOLOGY | Facility: CLINIC | Age: 50
End: 2020-07-27

## 2020-07-27 NOTE — TELEPHONE ENCOUNTER

## 2020-07-27 NOTE — TELEPHONE ENCOUNTER
PATIENT WELLNESS TELEPHONE SCREENING     Step 1 Screening Questions    In the past 3 weeks, have you been exposed to someone with a suspected or known illness?  COVID-19? No  Chickenpox? No   Measles? No  Pertussis? No    In the past 2 weeks, have you had any of the following symptoms?   Fever/Chills? No   Cough? No   Shortness of breath? No   New loss of taste or smell? No  Sore throat? No  Muscle or body aches? No  Headaches? No  Fatigue? No  Vomiting or diarrhea? No    Step 2 Screening Results (Skip if the patient is negative for symptoms)    If the patient is positive for new or worsening symptoms, contact the ordering provider to determine if the procedure is deemed necessary. Determine if patient can be re-scheduled when the patient is symptom free or has a negative COVID test.     If ordering provider deems the procedure is necessary, notify your manager/supervisor. Provide the patient with the procedural department phone number and inform the patient to call the procedural department upon arrival.  The patient will be registered over the phone.    Step 3 Review Visitor Policy  Patient informed of the updated visitor policy   1 visitor allowed per patient   Visitor must screen negative for COVID symptoms   Visitor must wear a mask    Shonda Pineda

## 2020-07-28 ENCOUNTER — HOSPITAL ENCOUNTER (OUTPATIENT)
Dept: CARDIOLOGY | Facility: CLINIC | Age: 50
Discharge: HOME | End: 2020-07-28
Attending: INTERNAL MEDICINE | Admitting: INTERNAL MEDICINE
Payer: COMMERCIAL

## 2020-07-28 ENCOUNTER — TELEPHONE (OUTPATIENT)
Dept: CARDIOLOGY | Facility: CLINIC | Age: 50
End: 2020-07-28

## 2020-07-28 ENCOUNTER — DOCUMENTATION ONLY (OUTPATIENT)
Dept: CARDIOLOGY | Facility: CLINIC | Age: 50
End: 2020-07-28

## 2020-07-28 DIAGNOSIS — I34.0 MITRAL VALVE REGURGITATION: ICD-10-CM

## 2020-07-28 DIAGNOSIS — R00.1 BRADYCARDIA: Primary | ICD-10-CM

## 2020-07-28 PROCEDURE — 93000 ELECTROCARDIOGRAM COMPLETE: CPT | Performed by: INTERNAL MEDICINE

## 2020-07-28 PROCEDURE — 93306 TTE W/DOPPLER COMPLETE: CPT

## 2020-07-28 PROCEDURE — 93306 TTE W/DOPPLER COMPLETE: CPT | Mod: 26 | Performed by: INTERNAL MEDICINE

## 2020-07-28 NOTE — PROGRESS NOTES
Spoke with Patient who states she is a little fatigued today, did not sleep well last night.  Patient agrees with recommendation of a treadmill  test, and wearing a 48 hr holter.  Orders entered. Scheduling to call.

## 2020-07-28 NOTE — PROGRESS NOTES
"Pt here for OP EKG in preparation for 2 yr follow up with Dr. Solis scheduled on 8/7/20. EKG reviewed and shows marked bradycardia at 44 bpm. Writer spoke with pt and she denies any lightheadedness, dizziness, presyncope or syncopal episodes since last OV with Dr. Solis on 8/2018. Denies chest pain or edema. States 2 yrs ago developed sudden onset of, \"I felt like I suddenly had aged 10-20 yrs. I had extreme fatigue and SOB with activity.\" It was at this time that she was seen by Dr. Solis at our clinic and work up completed. States the fatigue has improved moderately, but continues to feel DUNN...\"When I run, I feel like I am panting and have a hard time catching my breath.\"  Pt describes SOB as extreme. Pt bikes approximately 28 miles per week, and runs 1-2 mile 4 x weekly x past 3 months. Prior to that, she has always walked at least 3 miles daily. Pt denies any recent illness, except for some right hip pain. Prior EKG in 2018 showed SB rate of 51 bpm. Pt does not take any BB. Pt in no apparent distress today. Will continue to monitor and will route this note to Dr. Solis for review. Pt also had a scheduled Echo today. Echo results from 4/2018 as below. AAYUSH Velasquez RN.    Interpretation Summary  The visual ejection fraction is estimated at 60-65%.  The left ventricle is mildly dilated.  There is moderate (2+) mitral regurgitation.  There is moderate mitral valve prolapse.  "

## 2020-07-28 NOTE — TELEPHONE ENCOUNTER
Echo completed today, results below. Pt had outpatient EKG today which noted SR/SB 40's, plan is for Holter monitor and stress test. Routed to Dr Solis for review. Follow up appointment scheduled 8/7/20 w/ Dr Solis.     Interpretation Summary   (The upper limit of normal is 5.6cm for left ventricular size in end-diastole.)  LVIDs is 3.1cm  Left ventricular systolic function is normal.  The mitral leaflets appear thickened, hooded, and/or redundant, consistent  with myxomatous degeneration.  There is severe mitral valve prolapse.  There is moderate to mod-severe (2-3+) mitral regurgitation.  Late systole MR  Compared to prior study, there is no significant change.

## 2020-07-28 NOTE — PROGRESS NOTES
EKG performed per provider order, printed and shown to Amparo Velasquez RN, who came out and spoke with patient.  Patient stated she had fatigue and SOB with activity.  She has an in clinic appointment 8/7/2020 with Dr. Solis.  RIMA Bray

## 2020-07-28 NOTE — PROGRESS NOTES
Dr. Solis's reply - Patient noted to have sinus bradycardia on her routine ECG.  No particular symptoms of bradycardia.  Not on any AV slowing drugs.  Please have her complete the following testing before she sees me:    1.  48-hour Holter.  2.  An ECG treadmill stress test or GXT (no need for echocardiogram, just ECG).    Thank you.     SJ

## 2020-07-30 ENCOUNTER — TELEPHONE (OUTPATIENT)
Dept: CARDIOLOGY | Facility: CLINIC | Age: 50
End: 2020-07-30

## 2020-07-30 NOTE — TELEPHONE ENCOUNTER
PATIENT WELLNESS TELEPHONE SCREENING     Step 1 Screening Questions    In the past 3 weeks, have you been exposed to someone with a suspected or known illness?  COVID-19? No  Chickenpox? No   Measles? No  Pertussis? No    In the past 2 weeks, have you had any of the following symptoms?   Fever/Chills? No   Cough? No   Shortness of breath? No   New loss of taste or smell? No  Sore throat? No  Muscle or body aches? No  Headaches? No  Fatigue? No  Vomiting or diarrhea? No    Step 2 Screening Results (Skip if the patient is negative for symptoms)    If the patient is positive for new or worsening symptoms, contact the ordering provider to determine if the procedure is deemed necessary. Determine if patient can be re-scheduled when the patient is symptom free or has a negative COVID test.     If ordering provider deems the procedure is necessary, notify your manager/supervisor. Provide the patient with the procedural department phone number and inform the patient to call the procedural department upon arrival.  The patient will be registered over the phone.    Step 3 Review Visitor Policy  Patient informed of the updated visitor policy   1 visitor allowed per patient   Visitor must screen negative for COVID symptoms   Visitor must wear a mask    Yvonne Szymanski

## 2020-07-31 ENCOUNTER — MYC MEDICAL ADVICE (OUTPATIENT)
Dept: CARDIOLOGY | Facility: CLINIC | Age: 50
End: 2020-07-31

## 2020-08-03 NOTE — TELEPHONE ENCOUNTER
Patient called with Dr. Solis's reply - Patient transferred to scheduling to arrange 48 hr holter and stress echo.     Patient will call with any question s or concerns such as light headedness, dizziness or near syncope.     Patient agrees with plan.

## 2020-08-04 ENCOUNTER — TELEPHONE (OUTPATIENT)
Dept: CARDIOLOGY | Facility: CLINIC | Age: 50
End: 2020-08-04

## 2020-08-04 DIAGNOSIS — R00.1 BRADYCARDIA: ICD-10-CM

## 2020-08-04 DIAGNOSIS — Z86.79 H/O MITRAL VALVE PROLAPSE: Primary | ICD-10-CM

## 2020-08-04 NOTE — TELEPHONE ENCOUNTER
Left message for Dr. Solis with care team 2 in regards to patient having a stress test tomorrow, and patient answers yes to a sore throat on Covid-19 wellness screening.  Will call patient back after I hear recommendation from Dr. Solis's care team as to whether or not patient should reschedule.  Yvonne Szymanski, exercise physiologist.

## 2020-08-04 NOTE — TELEPHONE ENCOUNTER
PATIENT WELLNESS TELEPHONE SCREENING     Step 1 Screening Questions    In the past 3 weeks, have you been exposed to someone with a suspected or known illness?  COVID-19? No  Chickenpox? No   Measles? No  Pertussis? No    In the past 2 weeks, have you had any of the following symptoms?   Fever/Chills? No   Cough? No   Shortness of breath? No   New loss of taste or smell? No  Sore throat? Yes:   Muscle or body aches? No  Headaches? No  Fatigue? No  Vomiting or diarrhea? No    Step 2 Screening Results (Skip if the patient is negative for symptoms)    If the patient is positive for new or worsening symptoms, contact the ordering provider to determine if the procedure is deemed necessary. Determine if patient can be re-scheduled when the patient is symptom free or has a negative COVID test.     If ordering provider deems the procedure is necessary, notify your manager/supervisor. Provide the patient with the procedural department phone number and inform the patient to call the procedural department upon arrival.  The patient will be registered over the phone.    Step 3 Review Visitor Policy  Patient informed of the updated visitor policy   1 visitor allowed per patient   Visitor must screen negative for COVID symptoms   Visitor must wear a mask    Yvonne Szymanski

## 2020-08-04 NOTE — TELEPHONE ENCOUNTER
Nuclear Department called. Wellness screening done today for GXT tomorrow and olter 8-6-2020.  Patient failed screening due to a sore throat.  Patient had sore throat last week and tests were postponed.  Sore throat went away but returned this week.    Dr. Solis paged  - postpone test to maybe next week??

## 2020-08-05 ENCOUNTER — MYC MEDICAL ADVICE (OUTPATIENT)
Dept: FAMILY MEDICINE | Facility: CLINIC | Age: 50
End: 2020-08-05

## 2020-08-05 DIAGNOSIS — Z20.828 VIRAL DISEASE EXPOSURE: Primary | ICD-10-CM

## 2020-08-05 NOTE — PROGRESS NOTES
"Date: 2020 13:34:16  Clinician: Roberto Carlos Abdalla  Clinician NPI: [id number]  Patient: Letitia Yee  Patient : 1970  Patient Address: [address] [place] [address]  Patient Phone: [phone]  Visit Protocol: URI  Patient Summary:  Letitia is a 49 year old ( : 1970 ) female who initiated a Visit for COVID-19 (Coronavirus) evaluation and screening. When asked the question \"Please sign me up to receive news, health information and promotions. \", Letitia responded \"No\".    Letitia states her symptoms started gradually 5-6 days ago. After her symptoms started, they improved and then got worse again.   Her symptoms consist of a sore throat.   Symptom details   Sore throat: Letitia reports having mild throat pain (1-3 on a 10 point pain scale), does not have exudate on her tonsils, and can swallow liquids. The lymph nodes in her neck are not enlarged. A rash has not appeared on the skin since the sore throat started.    Letitia denies having wheezing, nausea, teeth pain, ageusia, diarrhea, myalgias, anosmia, facial pain or pressure, fever, cough, nasal congestion, vomiting, rhinitis, malaise, ear pain, headache, chills, and enlarged lymph nodes. She also denies having recent facial or sinus surgery in the past 60 days and taking antibiotic medication in the past month. She is not experiencing dyspnea.   Precipitating events  Within the past week, Letitia has not been exposed to someone with strep throat.   Pertinent COVID-19 (Coronavirus) information  In the past 14 days, Letitia has not worked in a congregate living setting.   She does not work or volunteer as healthcare worker or a  and does not work or volunteer in a healthcare facility.   Letitia also has not lived in a congregate living setting in the past 14 days. She does not live with a healthcare worker.   Letitia has not had a close contact with a laboratory-confirmed COVID-19 patient within 14 days of " symptom onset.   Pertinent medical history  Letitia does not get yeast infections when she takes antibiotics.   Letitia does not need a return to work/school note.   Weight: 136 lbs   Letitia does not smoke or use smokeless tobacco.   Weight: 136 lbs    MEDICATIONS: albuterol sulfate inhalation, lorazepam oral, ALLERGIES: NyQuil  Clinician Response:  Dear Letitia,   Your symptoms show that you may have coronavirus (COVID-19). This illness can cause fever, cough and trouble breathing. Many people get a mild case and get better on their own. Some people can get very sick.  What should I do?  We would like to test you for this virus.   1. Please call [phone] to schedule your visit. Explain that you were referred by [facility] to have a COVID-19 test. Be ready to share your [facility] visit ID number.  The following will serve as your written order for this COVID Test, ordered by me, for the indication of suspected COVID [Z20.828]: The test will be ordered in [employer], our electronic health record, after you are scheduled. It will show as ordered and authorized by Everett Muir MD.  Order: COVID-19 (Coronavirus) PCR for SYMPTOMATIC testing from [facility].      2. When it's time for your COVID test:  Stay at least 6 feet away from others. (If someone will drive you to your test, stay in the backseat, as far away from the  as you can.)   Cover your mouth and nose with a mask, tissue or washcloth.  Go straight to the testing site. Don't make any stops on the way there or back.      3.Starting now: Stay home and away from others (self-isolate) until:   You've had no fever---and no medicine that reduces fever---for 3 full days (72 hours). And...   Your other symptoms have gotten better. For example, your cough or breathing has improved. And...   At least 10 days have passed since your symptoms started.       During this time, don't leave the house except for testing or medical care.   Stay in your own room, even for meals. Use  "your own bathroom if you can.   Stay away from others in your home. No hugging, kissing or shaking hands. No visitors.  Don't go to work, school or anywhere else.    Clean \"high touch\" surfaces often (doorknobs, counters, handles, etc.). Use a household cleaning spray or wipes. You'll find a full list of  on the EPA website: www.epa.gov/pesticide-registration/list-n-disinfectants-use-against-sars-cov-2.   Cover your mouth and nose with a mask, tissue or washcloth to avoid spreading germs.  Wash your hands and face often. Use soap and water.  Caregivers in these groups are at risk for severe illness due to COVID-19:  o People 65 years and older  o People who live in a nursing home or long-term care facility  o People with chronic disease (lung, heart, cancer, diabetes, kidney, liver, immunologic)  o People who have a weakened immune system, including those who:   Are in cancer treatment  Take medicine that weakens the immune system, such as corticosteroids  Had a bone marrow or organ transplant  Have an immune deficiency  Have poorly controlled HIV or AIDS  Are obese (body mass index of 40 or higher)  Smoke regularly   o Caregivers should wear gloves while washing dishes, handling laundry and cleaning bedrooms and bathrooms.  o Use caution when washing and drying laundry: Don't shake dirty laundry, and use the warmest water setting that you can.  o For more tips, go to www.cdc.gov/coronavirus/2019-ncov/downloads/10Things.pdf.    4.Sign up for [facility]. We know it's scary to hear that you might have COVID-19. We want to track your symptoms to make sure you're okay over the next 2 weeks. Please look for an email from [employer]---this is a free, online program that we'll use to keep in touch. To sign up, follow the link in the email. Learn more at [email or web address]  How can I take care of myself?   Get lots of rest. Drink extra fluids (unless a doctor has told you not to).   Take Tylenol " (acetaminophen) for fever or pain. If you have liver or kidney problems, ask your family doctor if it's okay to take Tylenol.   Adults can take either:    650 mg (two 325 mg pills) every 4 to 6 hours, or...   1,000 mg (two 500 mg pills) every 8 hours as needed.    Note: Don't take more than 3,000 mg in one day. Acetaminophen is found in many medicines (both prescribed and over-the-counter medicines). Read all labels to be sure you don't take too much.   For children, check the Tylenol bottle for the right dose. The dose is based on the child's age or weight.    If you have other health problems (like cancer, heart failure, an organ transplant or severe kidney disease): Call your specialty clinic if you don't feel better in the next 2 days.       Know when to call 911. Emergency warning signs include:    Trouble breathing or shortness of breath Pain or pressure in the chest that doesn't go away Feeling confused like you haven't felt before, or not being able to wake up Bluish-colored lips or face.  Where can I get more information?   [facility] -- About COVID-19: [email or web address]   CDC -- What to Do If You're Sick: www.cdc.gov/coronavirus/2019-ncov/about/steps-when-sick.html   CDC -- Ending Home Isolation: www.cdc.gov/coronavirus/2019-ncov/hcp/disposition-in-home-patients.html   CDC -- Caring for Someone: www.cdc.gov/coronavirus/2019-ncov/if-you-are-sick/care-for-someone.html   [facility] -- Interim Guidance for Hospital Discharge to Home: www.health.[facility].[place]/diseases/coronavirus/hcp/hospdischarge.pdf   [facility] clinical trials (COVID-19 research studies): clinicalaffairs.[facility]/umn-clinical-trials    Below are the COVID-19 hotlines at the [place] Department of Health ([facility]). Interpreters are available.    For health questions: Call 537-393-1408 or 1-886.105.3093 (7 a.m. to 7 p.m.) For questions about schools and childcare: Call 059-212-0380 or 1-991.653.6537 (7 a.m. to 7 p.m.)        Rapid Strep Test - [place]    I am sorry you are not feeling well. Your strep test has . Based on the information provided, it is possible that you could have strep throat. When left untreated, strep can spread to other areas of the body and cause a more serious infection.  A strep test is the only way to determine if a bacterial infection or a virus is causing your sore throat. This is done in a lab where a swab of the back of your throat is collected tested for the bacteria that causes strep.  Since you chose not to use the lab order, please be seen:     In a clinic or urgent care    Within 24 hours     Call 911 or go to the emergency room if you suddenly develop a rash, are drooling or unable to swallow fluids, if you are having difficulty breathing, or feel that your throat is closing off.   Diagnosis: Pain in throat  Diagnosis ICD: R07.0  [facility] Results: Rapid Strep Test - [place] -   [employer]Ticket Secondary Results: null

## 2020-08-05 NOTE — TELEPHONE ENCOUNTER
FS  Please see [employer] message  Did you want to have a virtual visit for this?  Did pend order just in case.   Thank you,  Liz Cam RN

## 2020-08-05 NOTE — TELEPHONE ENCOUNTER
Frank Dong  Symptoms of Covid-19 varies from one person to another. Some will have mild symptoms or asymptomatic, others will have more severe symptoms.   If you wait 14 days and you fail the wellness screening again, they will cancel your appointments again.   I would recommend getting tested for Covid in 3-4 days to ensure that it is not false negative and re-schedule your cardiology appointments after you get your results.     Briana

## 2020-08-05 NOTE — TELEPHONE ENCOUNTER
Reviewed PCP note Recommend Covid test to r/o. Order has been placed by PCP.   GXT and 48 HR Holter orders re entered for 3-4 weeks out.   Final dates will be determined after Covid results received.     Patient to call when covid test results available.

## 2020-08-05 NOTE — TELEPHONE ENCOUNTER
Reviewed with manager regarding wellness screening noting new sore throat.  Recommendation is to cancel today and tomorrow's testing and reschedule for at least 2 weeks  - must be symptoms free .     Patient called. Patient states confusion regarding test or not test and how urgent is it to have GXT and Holter?    Patient will send PCP a My Chart message regarding sore throat and ask if Covid test advised or if she should wait for 2 weeks before rescheduling tests?    Patient agrees with cancelling today;s GXT and tomorrow's Holter.    Patient states she prefers to wait for 2 weeks to reschedule if these tests are not urgent.    Will message Dr. Solis.    Tests cancelled for today and tomorrow.   Nuclear Dept notified.

## 2020-08-06 ENCOUNTER — NURSE TRIAGE (OUTPATIENT)
Dept: NURSING | Facility: CLINIC | Age: 50
End: 2020-08-06

## 2020-08-06 ENCOUNTER — TELEPHONE (OUTPATIENT)
Dept: CARDIOLOGY | Facility: CLINIC | Age: 50
End: 2020-08-06

## 2020-08-06 NOTE — TELEPHONE ENCOUNTER
Letitia went for a check up for heart recommended by cardiologist.  EKG and echo and was told heart rate is low.  Letitia was told to have a covid test before she has her procedure with cardiology. (however there is no date set for procedure yet).     COVID 19 Nurse Triage Plan/Patient Instructions    Please be aware that novel coronavirus (COVID-19) may be circulating in the community. If you develop symptoms such as fever, cough, or SOB or if you have concerns about the presence of another infection including coronavirus (COVID-19), please contact your health care provider or visit [email or web address].     Disposition/Instructions    Home care recommended. Follow home care protocol based instructions.    Thank you for taking steps to prevent the spread of this virus.  o Limit your contact with others.  o Wear a simple mask to cover your cough.  o Wash your hands well and often.    Resources    [facility]: About COVID-19: [email or web address]    CDC: What to Do If You're Sick: www.cdc.gov/coronavirus/2019-ncov/about/steps-when-sick.html    CDC: Ending Home Isolation: www.cdc.gov/coronavirus/2019-ncov/hcp/disposition-in-home-patients.html     CDC: Caring for Someone: www.cdc.gov/coronavirus/2019-ncov/if-you-are-sick/care-for-someone.html     [facility]: Interim Guidance for Hospital Discharge to Home: www.health.[facility].[place]/diseases/coronavirus/hcp/hospdischarge.pdf    [facility] clinical trials (COVID-19 research studies): clinicalaffairs.[facility]-clinical-trials     Below are the COVID-19 hotlines at the [place] Department of Health ([facility]). Interpreters are available.   o For health questions: Call 139-769-3335 or 1-641.324.2033 (7 a.m. to 7 p.m.)  o For questions about schools and childcare: Call 022-544-5172 or 1-820.175.2828 (7 a.m. to 7 p.m.)                     Additional Information    Negative: Nursing judgment, per information in Reference    Negative: Information only call about a  Well Adult (no illness or injury)    Negative: Nursing judgment or information in reference    Negative: Nursing judgment or information in reference    Negative: Nursing judgment or information in reference    Negative: Nursing judgment or information in reference    Negative: Nursing judgment or information in reference    Negative: Nursing judgment or information in reference    Negative: Nursing judgment or information in reference    Negative: Nursing judgment or information in reference    Negative: Nursing judgment or information in reference    Negative: Nursing judgment or information in reference    Negative: Nursing judgment or information in reference    Negative: Nursing judgment or information in reference    Negative: Nursing judgment or information in reference    Nursing judgment or information in reference    Protocols used: NO GUIDELINE [id number]

## 2020-08-06 NOTE — TELEPHONE ENCOUNTER
"My Sent my chart message 8-6-2020 -   called Patient who stated frustration regarding getting her tests re scheduled.    PCP offered Covid testing from PCP  due to sore throat and wellness screening needed for testing.  Patient states she may not have a covid test because she is worried about her own safety.    Patient states she has received different information from \"everyone she has spoken to\"  Patient states \"I am tired of everything and just need to take a break\" from it.     Apologized for the inconvenience of cancelled testing and appointment.    Per Dr. Solis, GXT and Holter are not urgent and can be scheduled again in a few weeks.      No significant  change echo results. All tests will be reviewed again when she sees Dr. Solis post other testing.   Clarified that CA+ score was done 2018 had 0 Ca+ noted.        Patient agreed with plan and aware that scheduling will call in 2-3 weeks to reschedule.   "

## 2020-08-06 NOTE — TELEPHONE ENCOUNTER
Pt called and is seeking clarification on why she needs to wait to get a test. She is requesting a call back to talk about this.

## 2020-08-23 DIAGNOSIS — Z20.828 VIRAL DISEASE EXPOSURE: ICD-10-CM

## 2020-08-23 PROCEDURE — U0003 INFECTIOUS AGENT DETECTION BY NUCLEIC ACID (DNA OR RNA); SEVERE ACUTE RESPIRATORY SYNDROME CORONAVIRUS 2 (SARS-COV-2) (CORONAVIRUS DISEASE [COVID-19]), AMPLIFIED PROBE TECHNIQUE, MAKING USE OF HIGH THROUGHPUT TECHNOLOGIES AS DESCRIBED BY CMS-2020-01-R: HCPCS | Performed by: FAMILY MEDICINE

## 2020-08-25 LAB
SARS-COV-2 RNA SPEC QL NAA+PROBE: NOT DETECTED
SPECIMEN SOURCE: NORMAL

## 2020-08-26 ENCOUNTER — HOSPITAL ENCOUNTER (OUTPATIENT)
Dept: CARDIOLOGY | Facility: CLINIC | Age: 50
Discharge: HOME | End: 2020-08-26
Attending: INTERNAL MEDICINE | Admitting: INTERNAL MEDICINE
Payer: COMMERCIAL

## 2020-08-26 ENCOUNTER — TELEPHONE (OUTPATIENT)
Dept: CARDIOLOGY | Facility: CLINIC | Age: 50
End: 2020-08-26

## 2020-08-26 DIAGNOSIS — Z86.79 H/O MITRAL VALVE PROLAPSE: ICD-10-CM

## 2020-08-26 DIAGNOSIS — R00.1 BRADYCARDIA: ICD-10-CM

## 2020-08-26 PROCEDURE — 93017 CV STRESS TEST TRACING ONLY: CPT

## 2020-08-26 PROCEDURE — 93227 XTRNL ECG REC<48 HR R&I: CPT | Performed by: INTERNAL MEDICINE

## 2020-08-26 PROCEDURE — 93226 XTRNL ECG REC<48 HR SCAN A/R: CPT

## 2020-08-26 PROCEDURE — 93016 CV STRESS TEST SUPVJ ONLY: CPT | Performed by: INTERNAL MEDICINE

## 2020-08-26 PROCEDURE — 93018 CV STRESS TEST I&R ONLY: CPT | Performed by: INTERNAL MEDICINE

## 2020-08-26 NOTE — TELEPHONE ENCOUNTER
Exercise stress echo completed today, results below. Holter monitor is pending. Testing ordered 7/28/20 due to bradycardia 40s noted on routine EKG 7/28/20. Testing was delayed due to rule-out COVID, was negative on 8/23/20. Routed to Dr Solis for review.     Interpretation Summary     1. The patient exercised 10:55. Above average aerobic functional capacity;  achieved 13 METS.  2. The patient exhibited no chest pain during exercise. There was a normal BP  response to exercise.  3. The stress EKG is non-diagnostic due to pre-existing EKG abnomalities.  4. Mildly blunted HR response to exercise; achieved 81% of maximal age  predicted heart rate.

## 2020-08-27 NOTE — TELEPHONE ENCOUNTER
Kelli Solis MD Hiljus, Audrey G RN  Cc: [facility] Heart Team 2    Caller: Unspecified (Yesterday, 10:20 AM)               Will discuss all results at her appointment. Thanks.

## 2020-08-29 ENCOUNTER — OFFICE VISIT (OUTPATIENT)
Dept: URGENT CARE | Facility: URGENT CARE | Age: 50
End: 2020-08-29
Payer: COMMERCIAL

## 2020-08-29 VITALS
WEIGHT: 136 LBS | HEART RATE: 79 BPM | SYSTOLIC BLOOD PRESSURE: 133 MMHG | OXYGEN SATURATION: 97 % | TEMPERATURE: 98.4 F | DIASTOLIC BLOOD PRESSURE: 79 MMHG | BODY MASS INDEX: 23.34 KG/M2

## 2020-08-29 DIAGNOSIS — T63.441A BEE STING REACTION, ACCIDENTAL OR UNINTENTIONAL, INITIAL ENCOUNTER: ICD-10-CM

## 2020-08-29 DIAGNOSIS — L03.116 CELLULITIS OF LEG, LEFT: Primary | ICD-10-CM

## 2020-08-29 PROCEDURE — 99214 OFFICE O/P EST MOD 30 MIN: CPT

## 2020-08-29 RX ORDER — SULFAMETHOXAZOLE/TRIMETHOPRIM 800-160 MG
1 TABLET ORAL 2 TIMES DAILY
Qty: 20 TABLET | Refills: 0 | Status: SHIPPED | OUTPATIENT
Start: 2020-08-29 | End: 2020-09-08

## 2020-08-29 RX ORDER — MUPIROCIN 20 MG/G
OINTMENT TOPICAL 3 TIMES DAILY
Qty: 22 G | Refills: 0 | Status: SHIPPED | OUTPATIENT
Start: 2020-08-29 | End: 2020-09-16

## 2020-08-29 RX ORDER — CEPHALEXIN 500 MG/1
500 CAPSULE ORAL 2 TIMES DAILY
Qty: 20 CAPSULE | Refills: 0 | Status: SHIPPED | OUTPATIENT
Start: 2020-08-29 | End: 2020-09-08

## 2020-08-29 NOTE — PATIENT INSTRUCTIONS
If redness has not decreased by Monday am, return to clinic/PCP for a recheck    If increase redness, increase pain, fever to ER     Start cephalexin 500 mg 2 times a day for 10 days    Start bactrim 1 tablet 2 times a day for 10 days    Start mupirocin to area with each dressing change    If area is itching, ok to use hydrocortisone cream to area       Patient Education     Cellulitis  Cellulitis is an infection of the deep layers of skin. A break in the skin, such as a cut or scratch, can let bacteria under the skin. If the bacteria get to deep layers of the skin, it can be serious. If not treated, cellulitis can get into the bloodstream and lymph nodes. The infection can then spread throughout the body. This causes serious illness.  Cellulitis causes the affected skin to become red, swollen, warm, and sore. The reddened areas have a visible border. An open sore may leak fluid (pus). You may have a fever, chills, and pain.  Cellulitis is treated with antibiotics taken for 7 to 10 days. An open sore may be cleaned and covered with cool wet gauze. Symptoms should get better 1 to 2 days after treatment is started. Make sure to take all the antibiotics for the full number of days until they are gone. Keep taking the medicine even if your symptoms go away.  Home care  Follow these tips:    Limit the use of the part of your body with cellulitis.     If the infection is on your leg, keep your leg raised while sitting. This will help to reduce swelling.    Take all of the antibiotic medicine exactly as directed until it is gone. Do not miss any doses, especially during the first 7 days. Don t stop taking the medicine when your symptoms get better.    Keep the affected area clean and dry.    Wash your hands with soap and warm water before and after touching your skin. Anyone else who touches your skin should also wash his or her hands. Don't share towels.  Follow-up care  Follow up with your healthcare provider, or as  advised. If your infection does not go away on the first antibiotic, your healthcare provider will prescribe a different one.  When to seek medical advice  Call your healthcare provider right away if any of these occur:    Red areas that spread    Swelling or pain that gets worse    Fluid leaking from the skin (pus)    Fever higher of 100.4  F (38.0  C) or higher after 2 days on antibiotics  Date Last Reviewed: 9/1/2016 2000-2019 [employer]. [address] [place] [address]. All rights reserved. This information is not intended as a substitute for professional medical care. Always follow your healthcare professional's instructions.           Patient Education     Local Reaction to an Insect Sting   You have been stung or bitten by an insect. The insect s venom or body fluid is causing your skin to react in the area where you were stung or bitten. This often causes redness, itching and swelling. This reaction will fade over a few hours, but it can last a few days. An insect bite or sting can become infected 1 to 3 days later, so watch for the signs below. Sometimes it is hard to tell the difference between a local reaction to the insect bite or sting and an early infection, so you may be given antibiotics.  Common insect stings causing problems are from wasps, bees, yellow jackets, and hornets. Common bites are from spiders, mosquitoes, fleas, or ticks. Other types of insects may be more common in different parts of the country or world.  Most people think of allergic reactions when someone has a rash or itchy skin. Symptoms can include:    Rash, hives, redness, welts, or blisters    Itching, burning, stinging, or pain    Swelling around the sting area.  Sometimes swelling spreads to other areas.  Home care  Medicines  The healthcare provider may prescribe medicines to relieve swelling, itching, and pain. Follow the provider s instructions when taking these medicines.    If you had a severe  reaction, the provider may prescribe an epinephrine kit. Epinephrine will stop an allergic reaction from getting worse. Before you leave the hospital, be sure that you understand when and how to use this medicine.    Diphenhydramine is an oral antihistamine available at drugstores and groceries. Unless a prescription antihistamine was given, you can use this medicine to reduce itching if large areas of the skin are involved. The medicine may make you sleepy, so be careful using it in the daytime or when going to school, working, or driving. Don t use diphenhydramine if you have glaucoma or if you are a man with trouble urinating because of an enlarged prostate. Other antihistamines cause less drowsiness and are good choices for daytime use. Ask your pharmacist for suggestions.    Don t use diphenhydramine cream on your skin. In some people it can cause additional reaction and make you allergic to this medicine.    Calamine lotion or oatmeal baths sometimes help with itching.    You may use acetaminophen or ibuprofen to control pain, unless another pain medicine was prescribed. Talk with your healthcare provider before using these medicines if you have chronic liver or kidney disease. Also talk with your provider if you ve had a stomach ulcer or GI bleeding.  General care      If itching is a problem, don t take hot showers or baths. Stay out of direct sunlight. These heat up your skin and will make the itching worse.    Use an ice pack to reduce local areas of redness and itching. You can make your own ice pack by putting ice cubes in a bag that seals and wrapping it in a thin towel. Don t put the ice directly on your skin, because it can damage the skin.    Try not to scratch any affected areas and damage the skin. This will help prevent an infection.    If oral antibiotics were prescribed, be sure to take them until finished.  Preventing future reactions    Future reactions could be worse than this one, so try to  stay away from places where you might be stung again.    Be aware that honeybees nest in trees. Wasps and yellow jackets nest in the ground, trees, or roof eaves.    If you are stung by a honeybee, a stinger will remain in your skin. Wasps, yellow jackets, and hornets don t leave a stinger behind. Move away from the nest area right away. The stinger of a honeybee releases a substance that will attract other bees to you. Once you are away from the nest, then remove the stinger as quickly as possible.    After any sting, you may apply ice and take diphenhydramine or another antihistamine. If you develop any of the warning signs below, seek help right away.    If you are at high risk for another sting, or if your reaction included dizziness, fainting, or trouble breathing or swallowing, ask your doctor for an insect allergy kit.    Remove any ticks on the skin with a set of fine tweezers.  the tick as close to the skin as possible. Pull back gently but firmly. Use an even, steady pressure. Don t jerk or twist. Don t squeeze, crush, or puncture the body of the tick. The bodily fluids may contain infection-causing germs. Don t use a smoldering match or cigarette, nail polish, petroleum jelly, liquid soap, or kerosene. These may irritate the tick. If any mouthparts of the tick remain in the skin, these should be left alone. They will fall off on their own. Trying to remove these parts may damage the skin unless they can be removed very easily. After the tick is removed, wash the bite area with rubbing alcohol, iodine, or soap and water.  Follow-up care  Follow up with your doctor in 2 days, or as advised, if your symptoms don t start to get better.  Call 911  Call 911 if any of these occur:    Trouble breathing or swallowing, or wheezing    New or worsening swelling in the mouth, throat, or tongue    Hoarse voice or trouble speaking    Confused    Very drowsy or trouble awakening    Fainting or loss of  consciousness    Rapid heart rate    Low blood pressure    Feeling of doom    Nausea, vomiting, abdominal pain, or diarrhea    Vomiting blood, or large amounts of blood in stool    Seizure  When to seek medical advice  Call your healthcare provider right away if any of these occur:    Spreading areas of itching, redness or swelling    New or worse swelling in the face, eyelids, or  lips    Dizziness or weakness  Also call your provider right away if you have signs of infection:    Spreading redness    Increased pain or swelling    Fever of 100.4 F (38 C) or higher, or as directed by your healthcare provider    Colored fluid draining from the sting area   Date Last Reviewed: 10/1/2016    7430-7271 [employer]. [address] [place], [place] [id number]. All rights reserved. This information is not intended as a substitute for professional medical care. Always follow your healthcare professional's instructions.           Patient Education     Infected Insect Bite or Sting    When an insect stings you, it injects venom. When an insect bites you, it does not. Stings and bites may cause a local reaction. Or they may cause a reaction that affects your whole body. Bites and stings may become infected. Signs of infection include redness, warmth, pain, drainage of pus, and swelling. Infections will need treatment with antibiotics and should get better over the next 10 days. However, they can sometimes form an abscess (a pocket of pus) that needs to be opened by a healthcare provider to release the pus.   Home care  The following will help you care for your bite or sting at home:    If a stinger is still in your skin, it will need to be removed. Don't use tweezers. Gently scrape the stinger from the side with a firm object such as the side of a credit card. This will loosen it and remove it from your skin.    If itching is a problem, applying ice packs to the sting area will help.    Wash the area with soap and  "water at least 3 times a day. Apply a topical antibiotic cream or ointment.    You can use an over-the counter antihistamine unless your doctor has given you a prescription antihistamine. You may use antihistamines to reduce itching if large areas of the skin are involved. Use lower doses during the daytime and higher doses at bedtime since the drug may make you sleepy. Don't use an antihistamine if you have glaucoma or if you are a man with trouble urinating due to an enlarged prostate. Some antihistamines cause less drowsiness and are a good choice for daytime use.    If oral antibiotics have been prescribed, be sure to take them as directed until they are all finished.    You may use over-the-counter pain medicine to control pain, unless another pain medicine was prescribed. Talk with your doctor before using acetaminophen or ibuprofen if you have chronic liver or kidney disease. Also talk with your doctor if you have ever had a stomach ulcer or gastrointestinal bleeding.  Follow-up care  Follow up with your healthcare provider, or as advised if you don't get better over the next 2 days or if your symptoms get worse.  Call 911  Call 911 if any of these occur:    Swelling of the face, eyelids, mouth, throat, or tongue    Difficulty swallowing or breathing  When to seek medical advice  Call your healthcare provider right away if any of these occur:    Spreading areas of redness or swelling    Fever of 100.4 F (38 C) or higher, or as directed by your healthcare provider    Increased local pain, especially if the area starts feeling \"squishy\" like a water ballon.    Headache, fever, chills, muscle or joint aching, or vomiting,    New rash  Date Last Reviewed: 10/1/2016    0919-9160 [employer]. [address] [place] [id number]. All rights reserved. This information is not intended as a substitute for professional medical care. Always follow your healthcare professional's instructions.       "

## 2020-08-29 NOTE — PROGRESS NOTES
Patient presents with:  Urgent Care  Insect Bites: bee sting on thursday, very painful,redness getting bigger,swollen,blister.       Subjective     Letitia Yee is a 49 year old female who presents to clinic today for the following health issues:    HPI   Insect bite   Onset/Duration: 3 day ago  Description   insect bite on leg/ankle, now red/painfuil   Location: posterior lower leg on the left, bite on dorsum of ankle on right  Character: large red area with central pustule and blisters on the soft tissue surrounding the pustule  Itching: no  Intensity:  moderate  Progression of Symptoms:  worsening  Accompanying signs and symptoms:   Fever: no/felt chilled yesterday   Body aches or joint pain: no  Sore throat symptoms: no  Recent cold symptoms: no  History:           Previous episodes of similar rash: None  New exposures:bee sting in past, on her ear, this reaction is more severe, no history of anaphylaxis, throat tighting, can not take benadryl  Recent travel: none  Exposure to similar rash: no  Precipitating or alleviating factors: bee sting as noted, area was initially  pruritic and patient was scratching at the area in her sleep    Therapies tried and outcome: none      Patient Active Problem List   Diagnosis     Hordeolum externum of right lower eyelid     Migraine with aura and without status migrainosus, not intractable     Adjustment disorder with anxious mood     Insomnia, unspecified type     Mitral valve prolapse     Mitral regurgitation     Past Surgical History:   Procedure Laterality Date     COLONOSCOPY N/A 2020    Procedure: COLONOSCOPY;  Surgeon: Dario Vieyra MD;  Location:  GI       Social History     Tobacco Use     Smoking status: Former Smoker     Packs/day: 1.00     Years: 3.00     Pack years: 3.00     Types: Cigarettes     Start date:      Quit date:      Years since quittin.0     Smokeless tobacco: Never Used   Substance Use Topics     Alcohol use:  Yes     Comment: Socially     Family History   Problem Relation Age of Onset     Arthritis Mother      Depression Mother      Genitourinary Problems Mother      Heart Defect Father      Hypertension Father      Hyperlipidemia Father      Alcoholism Sister      Depression Sister      Mental Illness Sister      Alcoholism Brother      Depression Brother      Asthma Maternal Grandfather          Current Outpatient Medications   Medication Sig Dispense Refill     acyclovir (ZOVIRAX) 400 MG tablet TK 1 T PO  BID. TK 1 T PO BID FOR 5 DAYS FOR EACH OUTBREAK  1     albuterol (PROAIR HFA/PROVENTIL HFA/VENTOLIN HFA) 108 (90 Base) MCG/ACT inhaler Inhale 2 puffs into the lungs every 4 hours as needed for shortness of breath / dyspnea or wheezing 1 Inhaler 5     cyanocobalamin (VITAMIN B-12) 1000 MCG tablet Take by mouth daily       levonorgestrel (MIRENA) 20 MCG/24HR IUD 1 each by Intrauterine route once       LORazepam (ATIVAN) 0.5 MG tablet Take 1 tablet (0.5 mg) by mouth nightly as needed for agitation, anxiety or sleep 30 tablet 1     neomycin-polymyxin-hydrocortisone (CORTISPORIN) 3.5-38520-0 otic suspension Place 3 drops Into the left ear 4 times daily 1 Bottle 1     Allergies   Allergen Reactions     Diphenhydramine Other (See Comments)     PN: Numbness and tingling.     Seasonal Allergies      Adhesive Tape Rash     Recent Labs   Lab Test 11/18/19  0941 07/17/18  0800 03/06/18  1426   A1C  --   --  4.7   LDL 91  --   --    HDL 60  --   --    TRIG 57  --   --    ALT 19  --   --    CR 0.75  --   --    GFRESTIMATED >90 77  --    GFRESTBLACK >90 >90  --    POTASSIUM 3.7  --   --    TSH  --   --  2.87                      Reviewed and updated as needed this visit by Provder                 Review of Systems   Constitutional, HEENT, cardiovascular, pulmonary, gi and gu systems are negative, except as otherwise noted.      Objective    /79   Pulse 79   Temp 98.4  F (36.9  C) (Tympanic)   Wt 61.7 kg (136 lb)   SpO2  97%   BMI 23.34 kg/m    Body mass index is 23.34 kg/m .  Physical Exam   GENERAL: healthy, alert and, mild distress from pain in left leg   HENT: Ears and TMs normal, oral mucosa and posterior oropharynx normal  NECK: no adenopathy, no asymmetry, masses, or scars and thyroid normal to palpation  RESP: lungs clear to auscultation - no rales, rhonchi or wheezes  CV: regular rate and rhythm, normal S1 S2, no S3 or S4, no murmur, click or rub, no peripheral edema and peripheral pulses strong    MS: no gross musculoskeletal defects noted, no edema on right, dorsum of right ankle small 1 cm in diameter red circular site of a bee sting, mild tender, no fluctuance, has decreased in size from the time of th initial sting yesterday  Left posterior upper calf below the popliteal space-raised pustule, with vesicular lesion in the soft tissue surrounding the pustule -1cm , overall lesion including the vesicular lesion is about 2 cm, Light palpated the area and the pustule ruptured with thick pale yellow pus expressed no strong odor, jagged opening to the pustule which  is about 0.5 cm in depth into the subcutaneous tissue, several of the vesicles in the surrounding soft tissue were oozing clear fluid as well     Area gently irrigated and small plain packing strip place in the opening of the pustules, telfa and 4x4 folded gauze pad over the area, patient is allergic to surgical tape, used paper tape and lightly wrapped with   Gauze wrap followed by ace wrap to keep dressing in place     SKIN: no other suspicious lesions or rashes except as noted   NEURO: Normal strength and tone, mentation intact and speech normal      Diagnostic Test Results:  Labs reviewed in Epic        Assessment & Plan     Letitia was seen today for urgent care and insect bites.    Diagnoses and all orders for this visit:      ICD-10-CM    1. Cellulitis of leg, left  L03.116 cephALEXin (KEFLEX) 500 MG capsule     sulfamethoxazole-trimethoprim (BACTRIM DS)  800-160 MG tablet     mupirocin (BACTROBAN) 2 % external ointment   2. Bee sting reaction, accidental or unintentional, initial encounter  T63.441A            Patient Instructions     If redness has not decreased by Monday am, return to clinic/PCP for a recheck    If increase redness, increase pain, fever to ER     Start cephalexin 500 mg 2 times a day for 10 days    Start bactrim 1 tablet 2 times a day for 10 days    Start mupirocin to area with each dressing change    If area is itching, ok to use hydrocortisone cream to area       Patient Education     Cellulitis  Cellulitis is an infection of the deep layers of skin. A break in the skin, such as a cut or scratch, can let bacteria under the skin. If the bacteria get to deep layers of the skin, it can be serious. If not treated, cellulitis can get into the bloodstream and lymph nodes. The infection can then spread throughout the body. This causes serious illness.  Cellulitis causes the affected skin to become red, swollen, warm, and sore. The reddened areas have a visible border. An open sore may leak fluid (pus). You may have a fever, chills, and pain.  Cellulitis is treated with antibiotics taken for 7 to 10 days. An open sore may be cleaned and covered with cool wet gauze. Symptoms should get better 1 to 2 days after treatment is started. Make sure to take all the antibiotics for the full number of days until they are gone. Keep taking the medicine even if your symptoms go away.  Home care  Follow these tips:    Limit the use of the part of your body with cellulitis.     If the infection is on your leg, keep your leg raised while sitting. This will help to reduce swelling.    Take all of the antibiotic medicine exactly as directed until it is gone. Do not miss any doses, especially during the first 7 days. Don t stop taking the medicine when your symptoms get better.    Keep the affected area clean and dry.    Wash your hands with soap and warm water before and  after touching your skin. Anyone else who touches your skin should also wash his or her hands. Don't share towels.  Follow-up care  Follow up with your healthcare provider, or as advised. If your infection does not go away on the first antibiotic, your healthcare provider will prescribe a different one.  When to seek medical advice  Call your healthcare provider right away if any of these occur:    Red areas that spread    Swelling or pain that gets worse    Fluid leaking from the skin (pus)    Fever higher of 100.4  F (38.0  C) or higher after 2 days on antibiotics  Date Last Reviewed: 9/1/2016 2000-2019 [employer]. [address] [place], [place] [id number]. All rights reserved. This information is not intended as a substitute for professional medical care. Always follow your healthcare professional's instructions.           Patient Education     Local Reaction to an Insect Sting   You have been stung or bitten by an insect. The insect s venom or body fluid is causing your skin to react in the area where you were stung or bitten. This often causes redness, itching and swelling. This reaction will fade over a few hours, but it can last a few days. An insect bite or sting can become infected 1 to 3 days later, so watch for the signs below. Sometimes it is hard to tell the difference between a local reaction to the insect bite or sting and an early infection, so you may be given antibiotics.  Common insect stings causing problems are from wasps, bees, yellow jackets, and hornets. Common bites are from spiders, mosquitoes, fleas, or ticks. Other types of insects may be more common in different parts of the country or world.  Most people think of allergic reactions when someone has a rash or itchy skin. Symptoms can include:    Rash, hives, redness, welts, or blisters    Itching, burning, stinging, or pain    Swelling around the sting area.  Sometimes swelling spreads to other areas.  Home  care  Medicines  The healthcare provider may prescribe medicines to relieve swelling, itching, and pain. Follow the provider s instructions when taking these medicines.    If you had a severe reaction, the provider may prescribe an epinephrine kit. Epinephrine will stop an allergic reaction from getting worse. Before you leave the hospital, be sure that you understand when and how to use this medicine.    Diphenhydramine is an oral antihistamine available at drugstores and groceries. Unless a prescription antihistamine was given, you can use this medicine to reduce itching if large areas of the skin are involved. The medicine may make you sleepy, so be careful using it in the daytime or when going to school, working, or driving. Don t use diphenhydramine if you have glaucoma or if you are a man with trouble urinating because of an enlarged prostate. Other antihistamines cause less drowsiness and are good choices for daytime use. Ask your pharmacist for suggestions.    Don t use diphenhydramine cream on your skin. In some people it can cause additional reaction and make you allergic to this medicine.    Calamine lotion or oatmeal baths sometimes help with itching.    You may use acetaminophen or ibuprofen to control pain, unless another pain medicine was prescribed. Talk with your healthcare provider before using these medicines if you have chronic liver or kidney disease. Also talk with your provider if you ve had a stomach ulcer or GI bleeding.  General care      If itching is a problem, don t take hot showers or baths. Stay out of direct sunlight. These heat up your skin and will make the itching worse.    Use an ice pack to reduce local areas of redness and itching. You can make your own ice pack by putting ice cubes in a bag that seals and wrapping it in a thin towel. Don t put the ice directly on your skin, because it can damage the skin.    Try not to scratch any affected areas and damage the skin. This will  help prevent an infection.    If oral antibiotics were prescribed, be sure to take them until finished.  Preventing future reactions    Future reactions could be worse than this one, so try to stay away from places where you might be stung again.    Be aware that honeybees nest in trees. Wasps and yellow jackets nest in the ground, trees, or roof eaves.    If you are stung by a honeybee, a stinger will remain in your skin. Wasps, yellow jackets, and hornets don t leave a stinger behind. Move away from the nest area right away. The stinger of a honeybee releases a substance that will attract other bees to you. Once you are away from the nest, then remove the stinger as quickly as possible.    After any sting, you may apply ice and take diphenhydramine or another antihistamine. If you develop any of the warning signs below, seek help right away.    If you are at high risk for another sting, or if your reaction included dizziness, fainting, or trouble breathing or swallowing, ask your doctor for an insect allergy kit.    Remove any ticks on the skin with a set of fine tweezers.  the tick as close to the skin as possible. Pull back gently but firmly. Use an even, steady pressure. Don t jerk or twist. Don t squeeze, crush, or puncture the body of the tick. The bodily fluids may contain infection-causing germs. Don t use a smoldering match or cigarette, nail polish, petroleum jelly, liquid soap, or kerosene. These may irritate the tick. If any mouthparts of the tick remain in the skin, these should be left alone. They will fall off on their own. Trying to remove these parts may damage the skin unless they can be removed very easily. After the tick is removed, wash the bite area with rubbing alcohol, iodine, or soap and water.  Follow-up care  Follow up with your doctor in 2 days, or as advised, if your symptoms don t start to get better.  Call 911  Call 911 if any of these occur:    Trouble breathing or swallowing, or  wheezing    New or worsening swelling in the mouth, throat, or tongue    Hoarse voice or trouble speaking    Confused    Very drowsy or trouble awakening    Fainting or loss of consciousness    Rapid heart rate    Low blood pressure    Feeling of doom    Nausea, vomiting, abdominal pain, or diarrhea    Vomiting blood, or large amounts of blood in stool    Seizure  When to seek medical advice  Call your healthcare provider right away if any of these occur:    Spreading areas of itching, redness or swelling    New or worse swelling in the face, eyelids, or  lips    Dizziness or weakness  Also call your provider right away if you have signs of infection:    Spreading redness    Increased pain or swelling    Fever of 100.4 F (38 C) or higher, or as directed by your healthcare provider    Colored fluid draining from the sting area   Date Last Reviewed: 10/1/2016    [id number] [employer]. [address] [place] [id number]. All rights reserved. This information is not intended as a substitute for professional medical care. Always follow your healthcare professional's instructions.           Patient Education     Infected Insect Bite or Sting    When an insect stings you, it injects venom. When an insect bites you, it does not. Stings and bites may cause a local reaction. Or they may cause a reaction that affects your whole body. Bites and stings may become infected. Signs of infection include redness, warmth, pain, drainage of pus, and swelling. Infections will need treatment with antibiotics and should get better over the next 10 days. However, they can sometimes form an abscess (a pocket of pus) that needs to be opened by a healthcare provider to release the pus.   Home care  The following will help you care for your bite or sting at home:    If a stinger is still in your skin, it will need to be removed. Don't use tweezers. Gently scrape the stinger from the side with a firm object such as the side  "of a credit card. This will loosen it and remove it from your skin.    If itching is a problem, applying ice packs to the sting area will help.    Wash the area with soap and water at least 3 times a day. Apply a topical antibiotic cream or ointment.    You can use an over-the counter antihistamine unless your doctor has given you a prescription antihistamine. You may use antihistamines to reduce itching if large areas of the skin are involved. Use lower doses during the daytime and higher doses at bedtime since the drug may make you sleepy. Don't use an antihistamine if you have glaucoma or if you are a man with trouble urinating due to an enlarged prostate. Some antihistamines cause less drowsiness and are a good choice for daytime use.    If oral antibiotics have been prescribed, be sure to take them as directed until they are all finished.    You may use over-the-counter pain medicine to control pain, unless another pain medicine was prescribed. Talk with your doctor before using acetaminophen or ibuprofen if you have chronic liver or kidney disease. Also talk with your doctor if you have ever had a stomach ulcer or gastrointestinal bleeding.  Follow-up care  Follow up with your healthcare provider, or as advised if you don't get better over the next 2 days or if your symptoms get worse.  Call 911  Call 911 if any of these occur:    Swelling of the face, eyelids, mouth, throat, or tongue    Difficulty swallowing or breathing  When to seek medical advice  Call your healthcare provider right away if any of these occur:    Spreading areas of redness or swelling    Fever of 100.4 F (38 C) or higher, or as directed by your healthcare provider    Increased local pain, especially if the area starts feeling \"squishy\" like a water ballon.    Headache, fever, chills, muscle or joint aching, or vomiting,    New rash  Date Last Reviewed: 10/1/2016    [id number] [employer]. [address] [facility], [place], [place] " [address]. All rights reserved. This information is not intended as a substitute for professional medical care. Always follow your healthcare professional's instructions.             Barbie Calderon MD       Urgent Care Provider  [facility] [place]    Reviewed medication instructions and side effects. Follow up if experiences side effects.     I reviewed supportive care, otc meds to use if needed, expected course, and signs of concern.  Follow up as needed or if she does not improve within 1-2 day(s) or if worsens in any way.  Reviewed red flag symptoms and is to go to the ER if experiences any of these.

## 2020-09-16 ENCOUNTER — OFFICE VISIT (OUTPATIENT)
Dept: CARDIOLOGY | Facility: CLINIC | Age: 50
End: 2020-09-16
Payer: COMMERCIAL

## 2020-09-16 VITALS
WEIGHT: 140 LBS | SYSTOLIC BLOOD PRESSURE: 130 MMHG | DIASTOLIC BLOOD PRESSURE: 80 MMHG | HEART RATE: 67 BPM | BODY MASS INDEX: 24.03 KG/M2

## 2020-09-16 DIAGNOSIS — I34.0 MODERATE MITRAL REGURGITATION: Primary | ICD-10-CM

## 2020-09-16 DIAGNOSIS — I34.1 MITRAL VALVE PROLAPSE: ICD-10-CM

## 2020-09-16 PROCEDURE — 99214 OFFICE O/P EST MOD 30 MIN: CPT | Performed by: INTERNAL MEDICINE

## 2020-09-16 NOTE — LETTER
9/16/2020      Shayan Warren MD  [address]  [facility] [address]      RE: Letitia Yee       Dear Colleague,    I had the pleasure of seeing Letitia Yee in the [facility].    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      HISTORY OF PRESENT ILLNESS:  It was my pleasure to follow up with Letitia today.  She is known to me from initial visit in 2018.  Letitia is a 49-year-old, slim  lady who appears much younger than stated age.  She is , has 2 children (a 10-year-old son and a 20-year-old daughter).      MEDICAL HISTORY:  Significant for:   1.  Mitral valve prolapse when she was a child.   2.  I have been seeing her for moderate to moderately severe mitral valve regurgitation.  Challenging to quantify because of eccentricity of the jet.  Her valve leaflets appear very myxomatous.  Based on a combination of testing, the consensus is she has moderately severe mitral valve regurgitation without any symptoms.  The conundrum was that her left ventricle was mildly dilated with a mildly decreased LVEF of 56%, but clearly the volume of the mitral regurgitation and the Doppler profile suggesting mid to late systolic jet suggests only moderate or moderate to severe regurgitation.  Her MR regurgitant volume is 33 mL, regurgitant fraction 31%.   3.  Remote former tobacco user who quit several years ago.      I am pleased to hear that Letitia is doing very well.  She has no symptoms of cardiovascular disease.  In fact, this year she has taken up running and has been able to do this very well.      There were some concerns that she felt that her resting heart rate is low, and to evaluate this, I obtained an exercise echocardiogram which shows  satisfactory heart rate response.      DIAGNOSTIC DATA:  Transthoracic echocardiogram - I personally reviewed the images.  Her left ventricle is at the upper limit of normal size with an end-diastolic diameter of 5.6 cm, end-systolic diameter 3.1 cm, LVEF is 60%-65% (which is actually mildly reduced in the context of significant mitral valve regurgitation).  Mitral valve leaflets are significantly myxomatous.  There is significant prolapse of the anterior leaflet and moderate to moderately severe MR, which is eccentric and posterolaterally directed.  However, the jet is late systolic only.  The left atrium is moderate to severely dilated with LA volume index of 60 mL/m2 and an LA dimension of 3.2 cm.  She has trace mitral valve regurgitation and estimated RVSP is normal.  A 48-hour Holter shows sinus rhythm throughout without any significant arrhythmias.      PHYSICAL EXAMINATION:  On exam, she is slim, alert, oriented, appears younger than stated age.  Rest of exam details attached to note.   CARDIOVASCULAR:  Undisplaced apical impulse, normal to palpation.  No parasternal heave.  Normal first heart sound, midsystolic click audible.  She has a soft 3/6 late systolic murmur only.  No diastolic rumble.   LUNGS:  Clear to auscultation.   ABDOMEN:  Soft and nontender.   EXTREMITIES:  No edema.      DIAGNOSES:   1.  Moderate mitral valve regurgitation secondary to mitral valve prolapse.   2.  Mild left ventricular dilatation (stable) with mildly decreased left ventricular systolic function (LVEF on MRI 56% and stable).  The LV dimension and LVEF are out of proportion decreased to the degree of MR.  See rationale above.      EXERCISE STRESS TEST:  She exercised for 13 METs, achieving an above-average exercise capacity.  Normal blood pressure response.  Heart rate response likely blunted, but she went from 60 at baseline to 140, which is 82% of maximum predicted.  Normal saturations.      ASSESSMENT/PLAN:   1.  Letitia  was understandably concerned after reading the echocardiogram report on [employer].  This was reported by my colleague.  The conundrum has always been that the degree of MR (based on Doppler/MRA quantification and physical exam) has been moderate, but does not explain why her LV is the upper limit of normal and LVEF is slightly reduced.  Also, supporting the moderate MR is her moderate LA enlargement.  While the LA volume index is 60, her lateral dimension is measured at 3.2 cm.  On auscultation, she clearly has no more than moderate MR.  The murmur is only late systolic.  Additionally, her exercise capacity has improved and she has taken up running this year.  All of this points to moderate MR or at most moderately severe MR and I tried to reassure her.  Certainly, over the last 2 years, her LVEF has not decreased and the LV is no more dilated.   2.  My overall gestalt is that Letitia has moderate mitral valve regurgitation and we should continue to monitor with yearly imaging studies.  Fortunately, she is an avid exerciser, so we will be alerted to the onset of symptoms by effort intolerance.   3.  I reminded her to take good care of her teeth and see a dentist regularly due to the risk of bacterial endocarditis of a myxomatous mitral valve.  Routine antibiotic prophylaxis is not indicated.   4.  I will plan on seeing her back in a year with pre-visit echocardiogram and labs.      cc:   Shayan Warren MD   [facility]   [address]   [place] [address]         [facility] BETINA PAN MD             D: 2020   T: 2020   MT: al      Name:     LETITIA SALDAÑA   MRN:      [id number]        Account:      [id number]   :      1970           Service Date: 2020      Document: [id number]         Outpatient Encounter Medications as of 2020   Medication Sig Dispense Refill     acyclovir (ZOVIRAX) 400 MG tablet TK 1 T PO  BID. TK 1 T PO BID FOR 5 DAYS FOR EACH OUTBREAK  1      albuterol (PROAIR HFA/PROVENTIL HFA/VENTOLIN HFA) 108 (90 Base) MCG/ACT inhaler Inhale 2 puffs into the lungs every 4 hours as needed for shortness of breath / dyspnea or wheezing 1 Inhaler 5     cyanocobalamin (VITAMIN B-12) 1000 MCG tablet Take by mouth daily       gabapentin (NEURONTIN) 300 MG capsule Take 1 capsule (300 mg) by mouth 3 times daily as needed (Trigeminal Neuralgia) At the onset of the pain. 15 capsule 0     levonorgestrel (MIRENA) 20 MCG/24HR IUD 1 each by Intrauterine route once       LORazepam (ATIVAN) 0.5 MG tablet Take 1 tablet (0.5 mg) by mouth nightly as needed for agitation, anxiety or sleep 30 tablet 1     [DISCONTINUED] hydrOXYzine (VISTARIL) 25 MG capsule Take 1-2 capsules (25-50 mg) by mouth 3 times daily as needed for anxiety (or sleep) 30 capsule 0     [DISCONTINUED] mupirocin (BACTROBAN) 2 % external ointment Apply topically 3 times daily 22 g 0     No facility-administered encounter medications on file as of 9/16/2020.        Again, thank you for allowing me to participate in the care of your patient.      Sincerely,    Kelli Solis MD     [facility]
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
## 2020-09-16 NOTE — PROGRESS NOTES
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      HISTORY OF PRESENT ILLNESS:    It was my pleasure to follow up with Letitia today.  She is known to me from initial visit in 2018.  Letitia is a 49-year-old, slim  lady who appears much younger than stated age.  She is , has 2 children (a 10-year-old son and a 20-year-old daughter).      Medical history is significant for:  1.  Mitral valve prolapse when she was a child.   2.  I have been seeing her for moderate to moderately severe mitral valve regurgitation.  Challenging to quantify because of jet eccentricity.  Her valve leaflets appear very myxomatous.  Based on a combination of testing, the consensus is she has moderately severe mitral valve regurgitation without any symptoms.  The conundrum was that her left ventricle was mildly dilated with a mildly decreased LVEF of 56%, but clearly the volume of the mitral regurgitation and the Doppler profile of a mid to late systolic jet suggests only moderate regurgitation.  Her MR regurgitant volume is 33 mL, regurgitant fraction 31%.   3.  Remote former tobacco user who quit several years ago.      I am pleased to hear that Letitia is doing very well.  She has no symptoms.  In fact, this year she has taken up running and has been able to do this very well.       DIAGNOSTIC DATA:    Transthoracic echocardiogram - I personally reviewed the images.  Her left ventricle is at the upper limit of normal size with an end-diastolic diameter of 5.6 cm, end-systolic diameter 3.1 cm, LVEF is 60%-65% (which is actually mildly reduced in the context of significant mitral valve regurgitation).  Mitral valve leaflets are significantly myxomatous.  There is significant prolapse of the anterior leaflet and moderate to  moderately severe MR, which is eccentric and posterolaterally directed.  However, the jet is late systolic only.  The left atrium is moderate to severely dilated with LA volume index of 60 mL/m2 and an LA dimension of 3.2 cm.  She has trace tricuspid valve regurgitation and estimated RVSP is normal.      A 48-hour Holter shows sinus rhythm throughout without any significant arrhythmias.     There were some concerns that she felt that her resting heart rate is low, and to evaluate this, I obtained an exercise echocardiogram which shows satisfactory heart rate response. She exercised for 13 METs, achieving an above-average exercise capacity.  Normal blood pressure response.  Heart rate response likely blunted, but she went from 60 at baseline to 140, which is 82% of maximum predicted.  Normal saturations.      PHYSICAL EXAMINATION:  On exam, she is slim, alert, oriented, appears younger than stated age.  Rest of exam details attached to note.   CARDIOVASCULAR:  Undisplaced apical impulse, normal to palpation.  No parasternal heave.  Normal first heart sound, midsystolic click audible.  She has a soft 3/6 late systolic murmur.  No diastolic rumble.   LUNGS:  Clear to auscultation.   ABDOMEN:  Soft and nontender.   EXTREMITIES:  No edema.      DIAGNOSES:   1.  Moderate mitral valve regurgitation secondary to mitral valve prolapse.   2.  Mild left ventricular dilatation (stable) with mildly decreased left ventricular systolic function (LVEF on MRI 56% and stable).  The LV dimension and LVEF are out of proportion decreased to the degree of MR.  See rationale above.      ASSESSMENT/PLAN:   Letitia was understandably concerned after reading the echocardiogram report on [facility].  This was reported by my colleague.  The conundrum has always been that the degree of MR based on Doppler/MRA quantification and physical exam has been moderate, but does not explain why her LV is the upper limit of normal and LVEF is slightly  reduced.  Also, supporting the moderate MR is her moderate LA enlargement.  While the LA volume index is 60, her lateral dimension is measured at 3.2 cm.  On auscultation, she clearly has no more than moderate MR.  The murmur is only late systolic.  Additionally, her exercise capacity has improved and she has taken up running this year.  All of this points to moderate MR or at worst moderately severe MR.  Certainly, over the last 2 years, her LVEF has not decreased and the LV is no more dilated. I tried to reassure her.      1.  My gestalt is that Letitia has moderate mitral valve regurgitation and we should continue to monitor with yearly imaging studies.  Fortunately, she is an avid exerciser, so we will be alerted to the onset of symptoms by effort intolerance.   2.  I reminded her to see a dentist regularly due to the risk of bacterial endocarditis of a myxomatous mitral valve.  Routine antibiotic prophylaxis is not indicated.   3.  I will plan on seeing her back in a year with pre-visit echocardiogram and labs.      cc:   Shayan Warren MD   [facility]   [address]   [place] [address]         [facility] BETINA PAN MD             D: 2020   T: 2020   MT: al      Name:     LETITIA SALDAÑA   MRN:      [id number]        Account:      [id number]   :      1970           Service Date: 2020      Document: [id number]

## 2020-11-07 ENCOUNTER — HEALTH MAINTENANCE LETTER (OUTPATIENT)
Age: 50
End: 2020-11-07

## 2020-12-03 ENCOUNTER — OFFICE VISIT (OUTPATIENT)
Dept: FAMILY MEDICINE | Facility: CLINIC | Age: 50
End: 2020-12-03
Payer: COMMERCIAL

## 2020-12-03 VITALS
OXYGEN SATURATION: 97 % | TEMPERATURE: 97.5 F | HEART RATE: 59 BPM | WEIGHT: 137.5 LBS | DIASTOLIC BLOOD PRESSURE: 91 MMHG | SYSTOLIC BLOOD PRESSURE: 136 MMHG | BODY MASS INDEX: 23.47 KG/M2 | HEIGHT: 64 IN

## 2020-12-03 DIAGNOSIS — H92.02 LEFT EAR PAIN: ICD-10-CM

## 2020-12-03 DIAGNOSIS — J02.9 SORE THROAT: Primary | ICD-10-CM

## 2020-12-03 LAB
DEPRECATED S PYO AG THROAT QL EIA: NEGATIVE
SPECIMEN SOURCE: NORMAL

## 2020-12-03 PROCEDURE — 99213 OFFICE O/P EST LOW 20 MIN: CPT | Performed by: FAMILY MEDICINE

## 2020-12-03 PROCEDURE — 87651 STREP A DNA AMP PROBE: CPT | Performed by: FAMILY MEDICINE

## 2020-12-03 PROCEDURE — 99N1174 PR STATISTIC STREP A RAPID: Performed by: FAMILY MEDICINE

## 2020-12-03 RX ORDER — NEOMYCIN SULFATE, POLYMYXIN B SULFATE AND HYDROCORTISONE 10; 3.5; 1 MG/ML; MG/ML; [USP'U]/ML
3 SUSPENSION/ DROPS AURICULAR (OTIC) 4 TIMES DAILY
Qty: 1 BOTTLE | Refills: 1 | Status: SHIPPED | OUTPATIENT
Start: 2020-12-03 | End: 2021-10-18

## 2020-12-03 ASSESSMENT — MIFFLIN-ST. JEOR: SCORE: 1223.7

## 2020-12-03 NOTE — PROGRESS NOTES
"Subjective     Letitia Yee is a 50 year old female who presents to clinic today for the following health issues:    HPI            Acute Illness  Acute illness concerns: URI  Onset/Duration: 1 week  Symptoms:  Fever: no  Chills/Sweats: no  Headache (location?): no  Sinus Pressure: swollen glands or lymph  Conjunctivitis:  no  Ear Pain: YES: left, stabbing pain   Rhinorrhea: no  Congestion: no  Sore Throat: YES  Cough: YES-productive of clear sputum , haven't coughed since  Wheeze: no  Decreased Appetite: YES be careful of what she's eating but she has abdominal pain   Nausea: no  Vomiting: no  Diarrhea: YES- soft stool for one day, sick feeling in the lower intestine  Dysuria/Freq.: no  Dysuria or Hematuria: no  Fatigue/Achiness: YES  Sick/Strep Exposure: no  Therapies tried and outcome: tylenol      Review of symptoms except as noted in the HPI is otherwise negative.  Recent Antibiotic use: none    MEDICATIONS  Current Outpatient Medications   Medication     acyclovir (ZOVIRAX) 400 MG tablet     albuterol (PROAIR HFA/PROVENTIL HFA/VENTOLIN HFA) 108 (90 Base) MCG/ACT inhaler     cyanocobalamin (VITAMIN B-12) 1000 MCG tablet     levonorgestrel (MIRENA) 20 MCG/24HR IUD     LORazepam (ATIVAN) 0.5 MG tablet     neomycin-polymyxin-hydrocortisone (CORTISPORIN) 3.5-33095-2 otic suspension     No current facility-administered medications for this visit.      Allergies: Diphenhydramine, Seasonal allergies, and Adhesive tape    OBJECTIVE:  BP (!) 136/91   Pulse 59   Temp 97.5  F (36.4  C) (Temporal)   Ht 1.626 m (5' 4\")   Wt 62.4 kg (137 lb 8 oz)   SpO2 97%   BMI 23.60 kg/m       General appearance: healthy, alert and cooperative.  Ears: normal  Nose: no rhinorrhea  Sinuses: normal; sinuses nontender  Oropharynx: mild erythema  Neck: few small nontender anterior cervical nodes    ASSESSMENT/PLAN:    ICD-10-CM    1. Sore throat  J02.9 Streptococcus A Rapid Scr w Reflx to PCR     Group A Streptococcus PCR Throat " Swab   2. Left ear pain  H92.02 neomycin-polymyxin-hydrocortisone (CORTISPORIN) 3.5-02048-4 otic suspension     Call or return to clinic if these symptoms worsen or fail to improve as anticipated.    Bao Brizuela MD, MPH.................... 12/3/2020

## 2020-12-04 LAB
SPECIMEN SOURCE: NORMAL
STREP GROUP A PCR: NOT DETECTED

## 2020-12-08 ASSESSMENT — ASTHMA QUESTIONNAIRES: ACT_TOTALSCORE: 24

## 2020-12-09 ENCOUNTER — MYC MEDICAL ADVICE (OUTPATIENT)
Dept: FAMILY MEDICINE | Facility: CLINIC | Age: 50
End: 2020-12-09

## 2021-01-04 NOTE — PROGRESS NOTES
Patient presents with:  Urgent Care  Insect Bites: bee sting on thursday, very painful,redness getting bigger,swollen,blister.       Subjective     Letitia Yee is a 49 year old female who presents to clinic today for the following health issues:    HPI   Insect bite   Onset/Duration: 3 day ago  Description   insect bite on leg/ankle, now red/painfuil   Location: posterior lower leg on the left, bite on dorsum of ankle on right  Character: large red area with central pustule and blisters on the soft tissue surrounding the pustule  Itching: no  Intensity:  moderate  Progression of Symptoms:  worsening  Accompanying signs and symptoms:   Fever: no/felt chilled yesterday   Body aches or joint pain: no  Sore throat symptoms: no  Recent cold symptoms: no  History:           Previous episodes of similar rash: None  New exposures:bee sting in past, on her ear, this reaction is more severe, no history of anaphylaxis, throat tighting, can not take benadryl  Recent travel: none  Exposure to similar rash: no  Precipitating or alleviating factors: bee sting as noted, area was initially  pruritic and patient was scratching at the area in her sleep    Therapies tried and outcome: none      Patient Active Problem List   Diagnosis     Hordeolum externum of right lower eyelid     Migraine with aura and without status migrainosus, not intractable     Adjustment disorder with anxious mood     Insomnia, unspecified type     Mitral valve prolapse     Mitral regurgitation     Past Surgical History:   Procedure Laterality Date     COLONOSCOPY N/A 2020    Procedure: COLONOSCOPY;  Surgeon: Dario Vieyra MD;  Location:  GI       Social History     Tobacco Use     Smoking status: Former Smoker     Packs/day: 1.00     Years: 3.00     Pack years: 3.00     Types: Cigarettes     Start date:      Quit date:      Years since quittin.0     Smokeless tobacco: Never Used   Substance Use Topics     Alcohol use:  Yes     Comment: Socially     Family History   Problem Relation Age of Onset     Arthritis Mother      Depression Mother      Genitourinary Problems Mother      Heart Defect Father      Hypertension Father      Hyperlipidemia Father      Alcoholism Sister      Depression Sister      Mental Illness Sister      Alcoholism Brother      Depression Brother      Asthma Maternal Grandfather          Current Outpatient Medications   Medication Sig Dispense Refill     acyclovir (ZOVIRAX) 400 MG tablet TK 1 T PO  BID. TK 1 T PO BID FOR 5 DAYS FOR EACH OUTBREAK  1     albuterol (PROAIR HFA/PROVENTIL HFA/VENTOLIN HFA) 108 (90 Base) MCG/ACT inhaler Inhale 2 puffs into the lungs every 4 hours as needed for shortness of breath / dyspnea or wheezing 1 Inhaler 5     cyanocobalamin (VITAMIN B-12) 1000 MCG tablet Take by mouth daily       levonorgestrel (MIRENA) 20 MCG/24HR IUD 1 each by Intrauterine route once       LORazepam (ATIVAN) 0.5 MG tablet Take 1 tablet (0.5 mg) by mouth nightly as needed for agitation, anxiety or sleep 30 tablet 1     neomycin-polymyxin-hydrocortisone (CORTISPORIN) 3.5-19083-9 otic suspension Place 3 drops Into the left ear 4 times daily 1 Bottle 1     Allergies   Allergen Reactions     Diphenhydramine Other (See Comments)     PN: Numbness and tingling.     Seasonal Allergies      Adhesive Tape Rash     Recent Labs   Lab Test 11/18/19  0941 07/17/18  0800 03/06/18  1426   A1C  --   --  4.7   LDL 91  --   --    HDL 60  --   --    TRIG 57  --   --    ALT 19  --   --    CR 0.75  --   --    GFRESTIMATED >90 77  --    GFRESTBLACK >90 >90  --    POTASSIUM 3.7  --   --    TSH  --   --  2.87      BP Readings from Last 3 Encounters:   12/03/20 (!) 136/91   09/16/20 130/80   08/29/20 133/79    Wt Readings from Last 3 Encounters:   12/03/20 62.4 kg (137 lb 8 oz)   09/16/20 63.5 kg (140 lb)   08/29/20 61.7 kg (136 lb)                      Reviewed and updated as needed this visit by Provider                 Review of  "Systems   Constitutional, HEENT, cardiovascular, pulmonary, gi and gu systems are negative, except as otherwise noted.      Objective    /79   Pulse 79   Temp 98.4  F (36.9  C) (Tympanic)   Wt 61.7 kg (136 lb)   SpO2 97%   BMI 23.34 kg/m    Body mass index is 23.34 kg/m .  Physical Exam   GENERAL: healthy, alert and, mild distress from pain in left leg   HENT: Ears and TMs normal, oral mucosa and posterior oropharynx normal  NECK: no adenopathy, no asymmetry, masses, or scars and thyroid normal to palpation  RESP: lungs clear to auscultation - no rales, rhonchi or wheezes  CV: regular rate and rhythm, normal S1 S2, no S3 or S4, no murmur, click or rub, no peripheral edema and peripheral pulses strong  MS: no gross musculoskeletal defects noted, no edema on right, dorsum of right ankle small 1 cm in diameter red circular site of a bee sting, mild tender, no fluctuance, has decreased in size from the time of th initial sting yesterday  Left posterior upper calf below the popliteal space-raised pustule, with vesicular lesion in the soft tissue surrounding the pustule -1cm , overall lesion including the vesicular lesion is about 2 cm, Light palpated the area and the pustule ruptured with thick pale yellow pus expressed no strong odor, jagged opening to the pustule which  is about 0.5 cm in depth into the subcutaneous tissue, several of the vesicles in the surrounding soft tissue were oozing clear fluid as well      Area gently irrigated and small plain packing strip place in the opening of the pustules, telfa and 4x4 folded gauze pad over the area, patient is allergic to surgical tape, used paper tape and lightly wrapped with    Gauze wrap followed by ace wrap to keep dressing in place   SKIN: no other suspicious lesions or rashes except as noted   NEURO: Normal strength and tone, mentation intact and speech normal  {O PHRASES:916010}     {Diagnostic Test Results (Optional):201449::\"Diagnostic Test " "Results:\",\"Labs reviewed in Epic\"}        Assessment & Plan     Letitia was seen today for urgent care and insect bites.    Diagnoses and all orders for this visit:    Cellulitis of leg, left  -     cephALEXin (KEFLEX) 500 MG capsule; Take 1 capsule (500 mg) by mouth 2 times daily for 10 days  -     sulfamethoxazole-trimethoprim (BACTRIM DS) 800-160 MG tablet; Take 1 tablet by mouth 2 times daily for 10 days  -     Discontinue: mupirocin (BACTROBAN) 2 % external ointment; Apply topically 3 times daily    Bee sting reaction, accidental or unintentional, initial encounter           ASSESSMENT/PLAN:      ICD-10-CM    1. Cellulitis of leg, left  L03.116 cephALEXin (KEFLEX) 500 MG capsule     sulfamethoxazole-trimethoprim (BACTRIM DS) 800-160 MG tablet     DISCONTINUED: mupirocin (BACTROBAN) 2 % external ointment   2. Bee sting reaction, accidental or unintentional, initial encounter  T63.441A        Patient Instructions     If redness has not decreased by Monday am, return to clinic/PCP for a recheck    If increase redness, increase pain, fever to ER     Start cephalexin 500 mg 2 times a day for 10 days    Start bactrim 1 tablet 2 times a day for 10 days    Start mupirocin to area with each dressing change    If area is itching, ok to use hydrocortisone cream to area       Patient Education     Cellulitis  Cellulitis is an infection of the deep layers of skin. A break in the skin, such as a cut or scratch, can let bacteria under the skin. If the bacteria get to deep layers of the skin, it can be serious. If not treated, cellulitis can get into the bloodstream and lymph nodes. The infection can then spread throughout the body. This causes serious illness.  Cellulitis causes the affected skin to become red, swollen, warm, and sore. The reddened areas have a visible border. An open sore may leak fluid (pus). You may have a fever, chills, and pain.  Cellulitis is treated with antibiotics taken for 7 to 10 days. An open sore " may be cleaned and covered with cool wet gauze. Symptoms should get better 1 to 2 days after treatment is started. Make sure to take all the antibiotics for the full number of days until they are gone. Keep taking the medicine even if your symptoms go away.  Home care  Follow these tips:    Limit the use of the part of your body with cellulitis.     If the infection is on your leg, keep your leg raised while sitting. This will help to reduce swelling.    Take all of the antibiotic medicine exactly as directed until it is gone. Do not miss any doses, especially during the first 7 days. Don t stop taking the medicine when your symptoms get better.    Keep the affected area clean and dry.    Wash your hands with soap and warm water before and after touching your skin. Anyone else who touches your skin should also wash his or her hands. Don't share towels.  Follow-up care  Follow up with your healthcare provider, or as advised. If your infection does not go away on the first antibiotic, your healthcare provider will prescribe a different one.  When to seek medical advice  Call your healthcare provider right away if any of these occur:    Red areas that spread    Swelling or pain that gets worse    Fluid leaking from the skin (pus)    Fever higher of 100.4  F (38.0  C) or higher after 2 days on antibiotics  Date Last Reviewed: 9/1/2016 2000-2019 [employer]. [address] [place] [address], [place] [address]. All rights reserved. This information is not intended as a substitute for professional medical care. Always follow your healthcare professional's instructions.           Patient Education     Local Reaction to an Insect Sting   You have been stung or bitten by an insect. The insect s venom or body fluid is causing your skin to react in the area where you were stung or bitten. This often causes redness, itching and swelling. This reaction will fade over a few hours, but it can last a few days. An insect bite  or sting can become infected 1 to 3 days later, so watch for the signs below. Sometimes it is hard to tell the difference between a local reaction to the insect bite or sting and an early infection, so you may be given antibiotics.  Common insect stings causing problems are from wasps, bees, yellow jackets, and hornets. Common bites are from spiders, mosquitoes, fleas, or ticks. Other types of insects may be more common in different parts of the country or world.  Most people think of allergic reactions when someone has a rash or itchy skin. Symptoms can include:    Rash, hives, redness, welts, or blisters    Itching, burning, stinging, or pain    Swelling around the sting area.  Sometimes swelling spreads to other areas.  Home care  Medicines  The healthcare provider may prescribe medicines to relieve swelling, itching, and pain. Follow the provider s instructions when taking these medicines.    If you had a severe reaction, the provider may prescribe an epinephrine kit. Epinephrine will stop an allergic reaction from getting worse. Before you leave the hospital, be sure that you understand when and how to use this medicine.    Diphenhydramine is an oral antihistamine available at drugstores and groceries. Unless a prescription antihistamine was given, you can use this medicine to reduce itching if large areas of the skin are involved. The medicine may make you sleepy, so be careful using it in the daytime or when going to school, working, or driving. Don t use diphenhydramine if you have glaucoma or if you are a man with trouble urinating because of an enlarged prostate. Other antihistamines cause less drowsiness and are good choices for daytime use. Ask your pharmacist for suggestions.    Don t use diphenhydramine cream on your skin. In some people it can cause additional reaction and make you allergic to this medicine.    Calamine lotion or oatmeal baths sometimes help with itching.    You may use acetaminophen  or ibuprofen to control pain, unless another pain medicine was prescribed. Talk with your healthcare provider before using these medicines if you have chronic liver or kidney disease. Also talk with your provider if you ve had a stomach ulcer or GI bleeding.  General care      If itching is a problem, don t take hot showers or baths. Stay out of direct sunlight. These heat up your skin and will make the itching worse.    Use an ice pack to reduce local areas of redness and itching. You can make your own ice pack by putting ice cubes in a bag that seals and wrapping it in a thin towel. Don t put the ice directly on your skin, because it can damage the skin.    Try not to scratch any affected areas and damage the skin. This will help prevent an infection.    If oral antibiotics were prescribed, be sure to take them until finished.  Preventing future reactions    Future reactions could be worse than this one, so try to stay away from places where you might be stung again.    Be aware that honeybees nest in trees. Wasps and yellow jackets nest in the ground, trees, or roof eaves.    If you are stung by a honeybee, a stinger will remain in your skin. Wasps, yellow jackets, and hornets don t leave a stinger behind. Move away from the nest area right away. The stinger of a honeybee releases a substance that will attract other bees to you. Once you are away from the nest, then remove the stinger as quickly as possible.    After any sting, you may apply ice and take diphenhydramine or another antihistamine. If you develop any of the warning signs below, seek help right away.    If you are at high risk for another sting, or if your reaction included dizziness, fainting, or trouble breathing or swallowing, ask your doctor for an insect allergy kit.    Remove any ticks on the skin with a set of fine tweezers.  the tick as close to the skin as possible. Pull back gently but firmly. Use an even, steady pressure. Don t jerk or  twist. Don t squeeze, crush, or puncture the body of the tick. The bodily fluids may contain infection-causing germs. Don t use a smoldering match or cigarette, nail polish, petroleum jelly, liquid soap, or kerosene. These may irritate the tick. If any mouthparts of the tick remain in the skin, these should be left alone. They will fall off on their own. Trying to remove these parts may damage the skin unless they can be removed very easily. After the tick is removed, wash the bite area with rubbing alcohol, iodine, or soap and water.  Follow-up care  Follow up with your doctor in 2 days, or as advised, if your symptoms don t start to get better.  Call 911  Call 911 if any of these occur:    Trouble breathing or swallowing, or wheezing    New or worsening swelling in the mouth, throat, or tongue    Hoarse voice or trouble speaking    Confused    Very drowsy or trouble awakening    Fainting or loss of consciousness    Rapid heart rate    Low blood pressure    Feeling of doom    Nausea, vomiting, abdominal pain, or diarrhea    Vomiting blood, or large amounts of blood in stool    Seizure  When to seek medical advice  Call your healthcare provider right away if any of these occur:    Spreading areas of itching, redness or swelling    New or worse swelling in the face, eyelids, or  lips    Dizziness or weakness  Also call your provider right away if you have signs of infection:    Spreading redness    Increased pain or swelling    Fever of 100.4 F (38 C) or higher, or as directed by your healthcare provider    Colored fluid draining from the sting area   Date Last Reviewed: 10/1/2016    4270-7868 [employer]. [address] [place] [address]. All rights reserved. This information is not intended as a substitute for professional medical care. Always follow your healthcare professional's instructions.           Patient Education     Infected Insect Bite or Sting    When an insect stings you, it  injects venom. When an insect bites you, it does not. Stings and bites may cause a local reaction. Or they may cause a reaction that affects your whole body. Bites and stings may become infected. Signs of infection include redness, warmth, pain, drainage of pus, and swelling. Infections will need treatment with antibiotics and should get better over the next 10 days. However, they can sometimes form an abscess (a pocket of pus) that needs to be opened by a healthcare provider to release the pus.   Home care  The following will help you care for your bite or sting at home:    If a stinger is still in your skin, it will need to be removed. Don't use tweezers. Gently scrape the stinger from the side with a firm object such as the side of a credit card. This will loosen it and remove it from your skin.    If itching is a problem, applying ice packs to the sting area will help.    Wash the area with soap and water at least 3 times a day. Apply a topical antibiotic cream or ointment.    You can use an over-the counter antihistamine unless your doctor has given you a prescription antihistamine. You may use antihistamines to reduce itching if large areas of the skin are involved. Use lower doses during the daytime and higher doses at bedtime since the drug may make you sleepy. Don't use an antihistamine if you have glaucoma or if you are a man with trouble urinating due to an enlarged prostate. Some antihistamines cause less drowsiness and are a good choice for daytime use.    If oral antibiotics have been prescribed, be sure to take them as directed until they are all finished.    You may use over-the-counter pain medicine to control pain, unless another pain medicine was prescribed. Talk with your doctor before using acetaminophen or ibuprofen if you have chronic liver or kidney disease. Also talk with your doctor if you have ever had a stomach ulcer or gastrointestinal bleeding.  Follow-up care  Follow up with your  "healthcare provider, or as advised if you don't get better over the next 2 days or if your symptoms get worse.  Call 911  Call 911 if any of these occur:    Swelling of the face, eyelids, mouth, throat, or tongue    Difficulty swallowing or breathing  When to seek medical advice  Call your healthcare provider right away if any of these occur:    Spreading areas of redness or swelling    Fever of 100.4 F (38 C) or higher, or as directed by your healthcare provider    Increased local pain, especially if the area starts feeling \"squishy\" like a water ballon.    Headache, fever, chills, muscle or joint aching, or vomiting,    New rash  Date Last Reviewed: 10/1/2016    1328-9821 [employer].. [address] [place] [address], [place] [address]. All rights reserved. This information is not intended as a substitute for professional medical care. Always follow your healthcare professional's instructions.                 [facility] Urgent Care Provider  [facility]    Reviewed medication instructions and side effects. Follow up if experiences side effects.     I reviewed supportive care, otc meds to use if needed, expected course, and signs of concern.  Follow up as needed or if she does not improve within {1-10:507677} {DAYS:539813} or if worsens in any way.  Reviewed red flag symptoms and is to go to the ER if experiences any of these.        "

## 2021-01-04 NOTE — PROGRESS NOTES
Patient presents with:  Urgent Care  Insect Bites: bee sting on thursday, very painful,redness getting bigger,swollen,blister.       Subjective     Letitia Yee is a 49 year old female who presents to clinic today for the following health issues:    HPI   Insect bite   Onset/Duration: 3 day ago  Description   insect bite on leg/ankle, now red/painfuil   Location: posterior lower leg on the left, bite on dorsum of ankle on right  Character: large red area with central pustule and blisters on the soft tissue surrounding the pustule  Itching: no  Intensity:  moderate  Progression of Symptoms:  worsening  Accompanying signs and symptoms:   Fever: no/felt chilled yesterday   Body aches or joint pain: no  Sore throat symptoms: no  Recent cold symptoms: no  History:           Previous episodes of similar rash: None  New exposures:bee sting in past, on her ear, this reaction is more severe, no history of anaphylaxis, throat tighting, can not take benadryl  Recent travel: none  Exposure to similar rash: no  Precipitating or alleviating factors: bee sting as noted, area was initially  pruritic and patient was scratching at the area in her sleep    Therapies tried and outcome: none      Patient Active Problem List   Diagnosis     Hordeolum externum of right lower eyelid     Migraine with aura and without status migrainosus, not intractable     Adjustment disorder with anxious mood     Insomnia, unspecified type     Mitral valve prolapse     Mitral regurgitation     Past Surgical History:   Procedure Laterality Date     COLONOSCOPY N/A 2020    Procedure: COLONOSCOPY;  Surgeon: Dario Vieyra MD;  Location:  GI       Social History     Tobacco Use     Smoking status: Former Smoker     Packs/day: 1.00     Years: 3.00     Pack years: 3.00     Types: Cigarettes     Start date:      Quit date:      Years since quittin.0     Smokeless tobacco: Never Used   Substance Use Topics     Alcohol use:  Yes     Comment: Socially     Family History   Problem Relation Age of Onset     Arthritis Mother      Depression Mother      Genitourinary Problems Mother      Heart Defect Father      Hypertension Father      Hyperlipidemia Father      Alcoholism Sister      Depression Sister      Mental Illness Sister      Alcoholism Brother      Depression Brother      Asthma Maternal Grandfather          Current Outpatient Medications   Medication Sig Dispense Refill     acyclovir (ZOVIRAX) 400 MG tablet TK 1 T PO  BID. TK 1 T PO BID FOR 5 DAYS FOR EACH OUTBREAK  1     albuterol (PROAIR HFA/PROVENTIL HFA/VENTOLIN HFA) 108 (90 Base) MCG/ACT inhaler Inhale 2 puffs into the lungs every 4 hours as needed for shortness of breath / dyspnea or wheezing 1 Inhaler 5     cyanocobalamin (VITAMIN B-12) 1000 MCG tablet Take by mouth daily       levonorgestrel (MIRENA) 20 MCG/24HR IUD 1 each by Intrauterine route once       LORazepam (ATIVAN) 0.5 MG tablet Take 1 tablet (0.5 mg) by mouth nightly as needed for agitation, anxiety or sleep 30 tablet 1     neomycin-polymyxin-hydrocortisone (CORTISPORIN) 3.5-49637-5 otic suspension Place 3 drops Into the left ear 4 times daily 1 Bottle 1     Allergies   Allergen Reactions     Diphenhydramine Other (See Comments)     PN: Numbness and tingling.     Seasonal Allergies      Adhesive Tape Rash     Recent Labs   Lab Test 11/18/19  0941 07/17/18  0800 03/06/18  1426   A1C  --   --  4.7   LDL 91  --   --    HDL 60  --   --    TRIG 57  --   --    ALT 19  --   --    CR 0.75  --   --    GFRESTIMATED >90 77  --    GFRESTBLACK >90 >90  --    POTASSIUM 3.7  --   --    TSH  --   --  2.87      BP Readings from Last 3 Encounters:   12/03/20 (!) 136/91   09/16/20 130/80   08/29/20 133/79    Wt Readings from Last 3 Encounters:   12/03/20 62.4 kg (137 lb 8 oz)   09/16/20 63.5 kg (140 lb)   08/29/20 61.7 kg (136 lb)                      Reviewed and updated as needed this visit by Provider                 Review of  "Systems   Constitutional, HEENT, cardiovascular, pulmonary, gi and gu systems are negative, except as otherwise noted.      Objective    /79   Pulse 79   Temp 98.4  F (36.9  C) (Tympanic)   Wt 61.7 kg (136 lb)   SpO2 97%   BMI 23.34 kg/m    Body mass index is 23.34 kg/m .  Physical Exam   GENERAL: healthy, alert and, mild distress from pain in left leg   HENT: Ears and TMs normal, oral mucosa and posterior oropharynx normal  NECK: no adenopathy, no asymmetry, masses, or scars and thyroid normal to palpation  RESP: lungs clear to auscultation - no rales, rhonchi or wheezes  CV: regular rate and rhythm, normal S1 S2, no S3 or S4, no murmur, click or rub, no peripheral edema and peripheral pulses strong  MS: no gross musculoskeletal defects noted, no edema on right, dorsum of right ankle small 1 cm in diameter red circular site of a bee sting, mild tender, no fluctuance, has decreased in size from the time of th initial sting yesterday  Left posterior upper calf below the popliteal space-raised pustule, with vesicular lesion in the soft tissue surrounding the pustule -1cm , overall lesion including the vesicular lesion is about 2 cm, Light palpated the area and the pustule ruptured with thick pale yellow pus expressed no strong odor, jagged opening to the pustule which  is about 0.5 cm in depth into the subcutaneous tissue, several of the vesicles in the surrounding soft tissue were oozing clear fluid as well      Area gently irrigated and small plain packing strip place in the opening of the pustules, telfa and 4x4 folded gauze pad over the area, patient is allergic to surgical tape, used paper tape and lightly wrapped with    Gauze wrap followed by ace wrap to keep dressing in place   SKIN: no other suspicious lesions or rashes except as noted   NEURO: Normal strength and tone, mentation intact and speech normal  {O PHRASES:515784}     {Diagnostic Test Results (Optional):963392::\"Diagnostic Test " "Results:\",\"Labs reviewed in Epic\"}        Assessment & Plan     Letitia was seen today for urgent care and insect bites.    Diagnoses and all orders for this visit:    Cellulitis of leg, left  -     cephALEXin (KEFLEX) 500 MG capsule; Take 1 capsule (500 mg) by mouth 2 times daily for 10 days  -     sulfamethoxazole-trimethoprim (BACTRIM DS) 800-160 MG tablet; Take 1 tablet by mouth 2 times daily for 10 days  -     Discontinue: mupirocin (BACTROBAN) 2 % external ointment; Apply topically 3 times daily    Bee sting reaction, accidental or unintentional, initial encounter           ASSESSMENT/PLAN:      ICD-10-CM    1. Cellulitis of leg, left  L03.116 cephALEXin (KEFLEX) 500 MG capsule     sulfamethoxazole-trimethoprim (BACTRIM DS) 800-160 MG tablet     DISCONTINUED: mupirocin (BACTROBAN) 2 % external ointment   2. Bee sting reaction, accidental or unintentional, initial encounter  T63.441A        Patient Instructions     If redness has not decreased by Monday am, return to clinic/PCP for a recheck    If increase redness, increase pain, fever to ER     Start cephalexin 500 mg 2 times a day for 10 days    Start bactrim 1 tablet 2 times a day for 10 days    Start mupirocin to area with each dressing change    If area is itching, ok to use hydrocortisone cream to area       Patient Education     Cellulitis  Cellulitis is an infection of the deep layers of skin. A break in the skin, such as a cut or scratch, can let bacteria under the skin. If the bacteria get to deep layers of the skin, it can be serious. If not treated, cellulitis can get into the bloodstream and lymph nodes. The infection can then spread throughout the body. This causes serious illness.  Cellulitis causes the affected skin to become red, swollen, warm, and sore. The reddened areas have a visible border. An open sore may leak fluid (pus). You may have a fever, chills, and pain.  Cellulitis is treated with antibiotics taken for 7 to 10 days. An open sore " may be cleaned and covered with cool wet gauze. Symptoms should get better 1 to 2 days after treatment is started. Make sure to take all the antibiotics for the full number of days until they are gone. Keep taking the medicine even if your symptoms go away.  Home care  Follow these tips:    Limit the use of the part of your body with cellulitis.     If the infection is on your leg, keep your leg raised while sitting. This will help to reduce swelling.    Take all of the antibiotic medicine exactly as directed until it is gone. Do not miss any doses, especially during the first 7 days. Don t stop taking the medicine when your symptoms get better.    Keep the affected area clean and dry.    Wash your hands with soap and warm water before and after touching your skin. Anyone else who touches your skin should also wash his or her hands. Don't share towels.  Follow-up care  Follow up with your healthcare provider, or as advised. If your infection does not go away on the first antibiotic, your healthcare provider will prescribe a different one.  When to seek medical advice  Call your healthcare provider right away if any of these occur:    Red areas that spread    Swelling or pain that gets worse    Fluid leaking from the skin (pus)    Fever higher of 100.4  F (38.0  C) or higher after 2 days on antibiotics  Date Last Reviewed: 9/1/2016 2000-2019 [employer]. [address] [place] [address], [place] [address]. All rights reserved. This information is not intended as a substitute for professional medical care. Always follow your healthcare professional's instructions.           Patient Education     Local Reaction to an Insect Sting   You have been stung or bitten by an insect. The insect s venom or body fluid is causing your skin to react in the area where you were stung or bitten. This often causes redness, itching and swelling. This reaction will fade over a few hours, but it can last a few days. An insect bite  or sting can become infected 1 to 3 days later, so watch for the signs below. Sometimes it is hard to tell the difference between a local reaction to the insect bite or sting and an early infection, so you may be given antibiotics.  Common insect stings causing problems are from wasps, bees, yellow jackets, and hornets. Common bites are from spiders, mosquitoes, fleas, or ticks. Other types of insects may be more common in different parts of the country or world.  Most people think of allergic reactions when someone has a rash or itchy skin. Symptoms can include:    Rash, hives, redness, welts, or blisters    Itching, burning, stinging, or pain    Swelling around the sting area.  Sometimes swelling spreads to other areas.  Home care  Medicines  The healthcare provider may prescribe medicines to relieve swelling, itching, and pain. Follow the provider s instructions when taking these medicines.    If you had a severe reaction, the provider may prescribe an epinephrine kit. Epinephrine will stop an allergic reaction from getting worse. Before you leave the hospital, be sure that you understand when and how to use this medicine.    Diphenhydramine is an oral antihistamine available at drugstores and groceries. Unless a prescription antihistamine was given, you can use this medicine to reduce itching if large areas of the skin are involved. The medicine may make you sleepy, so be careful using it in the daytime or when going to school, working, or driving. Don t use diphenhydramine if you have glaucoma or if you are a man with trouble urinating because of an enlarged prostate. Other antihistamines cause less drowsiness and are good choices for daytime use. Ask your pharmacist for suggestions.    Don t use diphenhydramine cream on your skin. In some people it can cause additional reaction and make you allergic to this medicine.    Calamine lotion or oatmeal baths sometimes help with itching.    You may use acetaminophen  or ibuprofen to control pain, unless another pain medicine was prescribed. Talk with your healthcare provider before using these medicines if you have chronic liver or kidney disease. Also talk with your provider if you ve had a stomach ulcer or GI bleeding.  General care      If itching is a problem, don t take hot showers or baths. Stay out of direct sunlight. These heat up your skin and will make the itching worse.    Use an ice pack to reduce local areas of redness and itching. You can make your own ice pack by putting ice cubes in a bag that seals and wrapping it in a thin towel. Don t put the ice directly on your skin, because it can damage the skin.    Try not to scratch any affected areas and damage the skin. This will help prevent an infection.    If oral antibiotics were prescribed, be sure to take them until finished.  Preventing future reactions    Future reactions could be worse than this one, so try to stay away from places where you might be stung again.    Be aware that honeybees nest in trees. Wasps and yellow jackets nest in the ground, trees, or roof eaves.    If you are stung by a honeybee, a stinger will remain in your skin. Wasps, yellow jackets, and hornets don t leave a stinger behind. Move away from the nest area right away. The stinger of a honeybee releases a substance that will attract other bees to you. Once you are away from the nest, then remove the stinger as quickly as possible.    After any sting, you may apply ice and take diphenhydramine or another antihistamine. If you develop any of the warning signs below, seek help right away.    If you are at high risk for another sting, or if your reaction included dizziness, fainting, or trouble breathing or swallowing, ask your doctor for an insect allergy kit.    Remove any ticks on the skin with a set of fine tweezers.  the tick as close to the skin as possible. Pull back gently but firmly. Use an even, steady pressure. Don t jerk or  twist. Don t squeeze, crush, or puncture the body of the tick. The bodily fluids may contain infection-causing germs. Don t use a smoldering match or cigarette, nail polish, petroleum jelly, liquid soap, or kerosene. These may irritate the tick. If any mouthparts of the tick remain in the skin, these should be left alone. They will fall off on their own. Trying to remove these parts may damage the skin unless they can be removed very easily. After the tick is removed, wash the bite area with rubbing alcohol, iodine, or soap and water.  Follow-up care  Follow up with your doctor in 2 days, or as advised, if your symptoms don t start to get better.  Call 911  Call 911 if any of these occur:    Trouble breathing or swallowing, or wheezing    New or worsening swelling in the mouth, throat, or tongue    Hoarse voice or trouble speaking    Confused    Very drowsy or trouble awakening    Fainting or loss of consciousness    Rapid heart rate    Low blood pressure    Feeling of doom    Nausea, vomiting, abdominal pain, or diarrhea    Vomiting blood, or large amounts of blood in stool    Seizure  When to seek medical advice  Call your healthcare provider right away if any of these occur:    Spreading areas of itching, redness or swelling    New or worse swelling in the face, eyelids, or  lips    Dizziness or weakness  Also call your provider right away if you have signs of infection:    Spreading redness    Increased pain or swelling    Fever of 100.4 F (38 C) or higher, or as directed by your healthcare provider    Colored fluid draining from the sting area   Date Last Reviewed: 10/1/2016    7944-2012 [employer]. [address] [place] [address]. All rights reserved. This information is not intended as a substitute for professional medical care. Always follow your healthcare professional's instructions.           Patient Education     Infected Insect Bite or Sting    When an insect stings you, it  injects venom. When an insect bites you, it does not. Stings and bites may cause a local reaction. Or they may cause a reaction that affects your whole body. Bites and stings may become infected. Signs of infection include redness, warmth, pain, drainage of pus, and swelling. Infections will need treatment with antibiotics and should get better over the next 10 days. However, they can sometimes form an abscess (a pocket of pus) that needs to be opened by a healthcare provider to release the pus.   Home care  The following will help you care for your bite or sting at home:    If a stinger is still in your skin, it will need to be removed. Don't use tweezers. Gently scrape the stinger from the side with a firm object such as the side of a credit card. This will loosen it and remove it from your skin.    If itching is a problem, applying ice packs to the sting area will help.    Wash the area with soap and water at least 3 times a day. Apply a topical antibiotic cream or ointment.    You can use an over-the counter antihistamine unless your doctor has given you a prescription antihistamine. You may use antihistamines to reduce itching if large areas of the skin are involved. Use lower doses during the daytime and higher doses at bedtime since the drug may make you sleepy. Don't use an antihistamine if you have glaucoma or if you are a man with trouble urinating due to an enlarged prostate. Some antihistamines cause less drowsiness and are a good choice for daytime use.    If oral antibiotics have been prescribed, be sure to take them as directed until they are all finished.    You may use over-the-counter pain medicine to control pain, unless another pain medicine was prescribed. Talk with your doctor before using acetaminophen or ibuprofen if you have chronic liver or kidney disease. Also talk with your doctor if you have ever had a stomach ulcer or gastrointestinal bleeding.  Follow-up care  Follow up with your  "healthcare provider, or as advised if you don't get better over the next 2 days or if your symptoms get worse.  Call 911  Call 911 if any of these occur:    Swelling of the face, eyelids, mouth, throat, or tongue    Difficulty swallowing or breathing  When to seek medical advice  Call your healthcare provider right away if any of these occur:    Spreading areas of redness or swelling    Fever of 100.4 F (38 C) or higher, or as directed by your healthcare provider    Increased local pain, especially if the area starts feeling \"squishy\" like a water ballon.    Headache, fever, chills, muscle or joint aching, or vomiting,    New rash  Date Last Reviewed: 10/1/2016    1341-0159 [employer]. [address] [place] [address], [place] [address]. All rights reserved. This information is not intended as a substitute for professional medical care. Always follow your healthcare professional's instructions.                 [facility] Urgent Care Provider  [facility]    Reviewed medication instructions and side effects. Follow up if experiences side effects.     I reviewed supportive care, otc meds to use if needed, expected course, and signs of concern.  Follow up as needed or if she does not improve within {1-10:465170} {DAYS:098019} or if worsens in any way.  Reviewed red flag symptoms and is to go to the ER if experiences any of these.        "

## 2021-01-15 ENCOUNTER — HEALTH MAINTENANCE LETTER (OUTPATIENT)
Age: 51
End: 2021-01-15

## 2021-02-17 ENCOUNTER — TELEPHONE (OUTPATIENT)
Dept: FAMILY MEDICINE | Facility: CLINIC | Age: 51
End: 2021-02-17

## 2021-02-17 NOTE — TELEPHONE ENCOUNTER
Patient Quality Outreach      Summary:    Patient has the following on her problem list/HM: None    Patient is due/failing the following:   Breast Cancer Screening - Mammogram    Type of outreach:    Sent [employer] message.    Questions for provider review:    None                                                                                       Olamide DAWSON RN

## 2021-02-18 ENCOUNTER — VIRTUAL VISIT (OUTPATIENT)
Dept: FAMILY MEDICINE | Facility: CLINIC | Age: 51
End: 2021-02-18
Payer: COMMERCIAL

## 2021-02-18 DIAGNOSIS — F43.21 ADJUSTMENT DISORDER WITH DEPRESSED MOOD: Primary | ICD-10-CM

## 2021-02-18 DIAGNOSIS — Z12.31 ENCOUNTER FOR SCREENING MAMMOGRAM FOR MALIGNANT NEOPLASM OF BREAST: ICD-10-CM

## 2021-02-18 PROCEDURE — 99213 OFFICE O/P EST LOW 20 MIN: CPT | Mod: 95 | Performed by: FAMILY MEDICINE

## 2021-02-18 PROCEDURE — 96127 BRIEF EMOTIONAL/BEHAV ASSMT: CPT | Performed by: FAMILY MEDICINE

## 2021-02-18 RX ORDER — ESCITALOPRAM OXALATE 10 MG/1
10 TABLET ORAL DAILY
Qty: 30 TABLET | Refills: 1 | Status: SHIPPED | OUTPATIENT
Start: 2021-02-18 | End: 2021-04-14

## 2021-02-18 ASSESSMENT — PATIENT HEALTH QUESTIONNAIRE - PHQ9: SUM OF ALL RESPONSES TO PHQ QUESTIONS 1-9: 12

## 2021-02-18 NOTE — PATIENT INSTRUCTIONS
Patient Education     Patient Education    Escitalopram Oral solution    Escitalopram Oral tablet  Escitalopram Oral tablet  What is this medicine?  ESCITALOPRAM (es sye JASBIR oh pram) is used to treat depression and certain types of anxiety.  This medicine may be used for other purposes; ask your health care provider or pharmacist if you have questions.  What should I tell my health care provider before I take this medicine?  They need to know if you have any of these conditions:    bipolar disorder or a family history of bipolar disorder    diabetes    glaucoma    heart disease    kidney or liver disease    receiving electroconvulsive therapy    seizures (convulsions)    suicidal thoughts, plans, or attempt by you or a family member    an unusual or allergic reaction to escitalopram, the related drug citalopram, other medicines, foods, dyes, or preservatives    pregnant or trying to become pregnant    breast-feeding  How should I use this medicine?  Take this medicine by mouth with a glass of water. Follow the directions on the prescription label. You can take it with or without food. If it upsets your stomach, take it with food. Take your medicine at regular intervals. Do not take it more often than directed. Do not stop taking this medicine suddenly except upon the advice of your doctor. Stopping this medicine too quickly may cause serious side effects or your condition may worsen.  A special MedGuide will be given to you by the pharmacist with each prescription and refill. Be sure to read this information carefully each time.  Talk to your pediatrician regarding the use of this medicine in children. Special care may be needed.  Overdosage: If you think you have taken too much of this medicine contact a poison control center or emergency room at once.  NOTE: This medicine is only for you. Do not share this medicine with others.  What if I miss a dose?  If you miss a dose, take it as soon as you can. If it is  almost time for your next dose, take only that dose. Do not take double or extra doses.  What may interact with this medicine?  Do not take this medicine with any of the following medications:    certain medicines for fungal infections like fluconazole, itraconazole, ketoconazole, posaconazole, voriconazole    cisapride    citalopram    dofetilide    dronedarone    linezolid    MAOIs like Carbex, Eldepryl, Marplan, Nardil, and Parnate    methylene blue (injected into a vein)    pimozide    thioridazine    ziprasidone  This medicine may also interact with the following medications:    alcohol    aspirin and aspirin-like medicines    carbamazepine    certain medicines for depression, anxiety, or psychotic disturbances    certain medicines for migraine headache like almotriptan, eletriptan, frovatriptan, naratriptan, rizatriptan, sumatriptan, zolmitriptan    certain medicines for sleep    certain medicines that treat or prevent blood clots like warfarin, enoxaparin, dalteparin    cimetidine    diuretics    fentanyl    furazolidone    isoniazid    lithium    metoprolol    NSAIDs, medicines for pain and inflammation, like ibuprofen or naproxen    other medicines that prolong the QT interval (cause an abnormal heart rhythm)    procarbazine    rasagiline    supplements like Jacob's wort, kava kava, valerian    tramadol    tryptophan  This list may not describe all possible interactions. Give your health care provider a list of all the medicines, herbs, non-prescription drugs, or dietary supplements you use. Also tell them if you smoke, drink alcohol, or use illegal drugs. Some items may interact with your medicine.  What should I watch for while using this medicine?  Tell your doctor if your symptoms do not get better or if they get worse. Visit your doctor or health care professional for regular checks on your progress. Because it may take several weeks to see the full effects of this medicine, it is important to  continue your treatment as prescribed by your doctor.  Patients and their families should watch out for new or worsening thoughts of suicide or depression. Also watch out for sudden changes in feelings such as feeling anxious, agitated, panicky, irritable, hostile, aggressive, impulsive, severely restless, overly excited and hyperactive, or not being able to sleep. If this happens, especially at the beginning of treatment or after a change in dose, call your health care professional.  You may get drowsy or dizzy. Do not drive, use machinery, or do anything that needs mental alertness until you know how this medicine affects you. Do not stand or sit up quickly, especially if you are an older patient. This reduces the risk of dizzy or fainting spells. Alcohol may interfere with the effect of this medicine. Avoid alcoholic drinks.  Your mouth may get dry. Chewing sugarless gum or sucking hard candy, and drinking plenty of water may help. Contact your doctor if the problem does not go away or is severe.  What side effects may I notice from receiving this medicine?  Side effects that you should report to your doctor or health care professional as soon as possible:    allergic reactions like skin rash, itching or hives, swelling of the face, lips, or tongue    confusion    feeling faint or lightheaded, falls    fast talking and excited feelings or actions that are out of control    hallucination, loss of contact with reality    seizures    suicidal thoughts or other mood changes    unusual bleeding or bruising  Side effects that usually do not require medical attention (report to your doctor or health care professional if they continue or are bothersome):    blurred vision    changes in appetite    change in sex drive or performance    headache    increased sweating    nausea  This list may not describe all possible side effects. Call your doctor for medical advice about side effects. You may report side effects to FDA at  1-800-FDA-1088.  Where should I keep my medicine?  Keep out of reach of children.  Store at room temperature between 15 and 30 degrees C (59 and 86 degrees F). Throw away any unused medicine after the expiration date.  NOTE:This sheet is a summary. It may not cover all possible information. If you have questions about this medicine, talk to your doctor, pharmacist, or health care provider. Copyright  2016 Gold Standard

## 2021-02-18 NOTE — NURSING NOTE
"Chief Complaint   Patient presents with     Depression     initial There were no vitals taken for this visit. Estimated body mass index is 23.6 kg/m  as calculated from the following:    Height as of 12/3/20: 1.626 m (5' 4\").    Weight as of 12/3/20: 62.4 kg (137 lb 8 oz).  BP completed using cuff size: .  L  R arm      Health Maintenance that is potentially due pending provider review:    Anton Chowdhury ma  "

## 2021-02-18 NOTE — PROGRESS NOTES
Letitia is a 50 year old who is being evaluated via a billable telephone visit.      What phone number would you like to be contacted at? [phone]  How would you like to obtain your AVS? MyChart    Assessment & Plan     Adjustment disorder with depressed mood  Assessment: adjustment disorder with depressed mood. Also has a few per  Initiate medication with Lexapro.   Reviewed concept of depression as function of biochemical imbalance of neurotransmitters/rationale for treatment.  Risks and benefits of medication(s) reviewed with patient.  Questions answered.  Patient instructed to call for significant side effects medications or problems  Plan:  - escitalopram (LEXAPRO) 10 MG tablet  Dispense: 30 tablet; Refill: 1    Encounter for screening mammogram for malignant neoplasm of breast  - *MA Screening Digital Bilateral    20 minutes spent on the date of the encounter doing chart review, history and exam, documentation and further activities as noted above     Depression Screening Follow Up    PHQ 2/18/2021   PHQ-9 Total Score 12   Q9: Thoughts of better off dead/self-harm past 2 weeks Not at all     Last PHQ-9 2/18/2021   1.  Little interest or pleasure in doing things 1   2.  Feeling down, depressed, or hopeless 1   3.  Trouble falling or staying asleep, or sleeping too much 1   4.  Feeling tired or having little energy 2   5.  Poor appetite or overeating 1   6.  Feeling bad about yourself 3   7.  Trouble concentrating 3   8.  Moving slowly or restless 0   Q9: Thoughts of better off dead/self-harm past 2 weeks 0   PHQ-9 Total Score 12   Difficulty at work, home, or with people Somewhat difficult       Follow Up Actions Taken  Return in about 1 month (around 3/18/2021) for Follow-up for Mental Health.    Shayan Warren MD  [facility]    Rafa Dong is a 50 year old who presents for the following health issues:    HPI     Patient went to a physical therapist for back pain. She is  physically active and runs often.  The patient reports that she has been very emotional. Carla very easily. Feeling sad and depressed. Multiple stressors.       PHQ 2/18/2021   PHQ-9 Total Score 12   Q9: Thoughts of better off dead/self-harm past 2 weeks Not at all     JOSE-7 SCORE 11/1/2018 9/11/2019 2/18/2021   Total Score - - 6 (mild anxiety)   Total Score 7 0 6       Review of Systems   Constitutional, HEENT, cardiovascular, pulmonary, gi and gu systems are negative, except as otherwise noted.      Objective       Vitals:  No vitals were obtained today due to virtual visit.    Physical Exam   healthy, alert and no distress  PSYCH: Alert and oriented times 3; coherent speech, normal   rate and volume, able to articulate logical thoughts, able   to abstract reason, no tangential thoughts, no hallucinations   or delusions  Her affect is normal  RESP: No cough, no audible wheezing, able to talk in full sentences  Remainder of exam unable to be completed due to telephone visits         Phone call duration: 15 minutes

## 2021-03-01 ENCOUNTER — HOSPITAL ENCOUNTER (OUTPATIENT)
Dept: MAMMOGRAPHY | Facility: CLINIC | Age: 51
Discharge: HOME | End: 2021-03-01
Attending: FAMILY MEDICINE | Admitting: FAMILY MEDICINE
Payer: COMMERCIAL

## 2021-03-01 DIAGNOSIS — Z12.31 ENCOUNTER FOR SCREENING MAMMOGRAM FOR MALIGNANT NEOPLASM OF BREAST: ICD-10-CM

## 2021-03-01 PROCEDURE — 77067 SCR MAMMO BI INCL CAD: CPT

## 2021-04-12 ENCOUNTER — IMMUNIZATION (OUTPATIENT)
Dept: NURSING | Facility: CLINIC | Age: 51
End: 2021-04-12
Payer: COMMERCIAL

## 2021-04-12 PROCEDURE — 0001A PR COVID VAC PFIZER DIL RECON 30 MCG/0.3 ML IM: CPT

## 2021-04-12 PROCEDURE — 91300 PR COVID VAC PFIZER DIL RECON 30 MCG/0.3 ML IM: CPT

## 2021-04-13 NOTE — PROGRESS NOTES
Letitia is a 50 year old who is being evaluated via a billable video visit.      How would you like to obtain your AVS? Edgarharky  If the video visit is dropped, the invitation should be resent by: Carlos or  Text to cell phone: [phone]  Will anyone else be joining your video visit? No    Video Start Time: 2:38 PM    Assessment & Plan     Adjustment disorder with depressed mood  Assessment: symptoms are well controlled.  The patient is currently taking Lexapro 10 mg once daily.  She is very pleased with the medication and desires to continue with the same dose.  Plan:  - escitalopram (LEXAPRO) 10 MG tablet; Take 1 tablet (10 mg) by mouth daily    Insomnia, unspecified type  Uses lorazepam for insomnia once every 1 to 2 weeks.  - LORazepam (ATIVAN) 0.5 MG tablet; Take 1 tablet (0.5 mg) by mouth nightly as needed for agitation, anxiety or sleep    Return in about 6 months (around 10/14/2021).    Shayan Warren MD  [facility]   Letitia is a 50 year old who presents for the following health issues    HPI     Medication Followup of lexapro    Taking Medication as prescribed: yes    Side Effects:  Noneonce a week.     Medication Helping Symptoms:  yes      PHQ 2/18/2021 4/14/2021   PHQ-9 Total Score 12 2   Q9: Thoughts of better off dead/self-harm past 2 weeks Not at all Not at all     JOSE-7 SCORE 9/11/2019 2/18/2021 4/14/2021   Total Score - 6 (mild anxiety) 2 (minimal anxiety)   Total Score 0 6 2    occassional restless leg at night after starting the medication.   Resolving slowly.   Sleep was intruppted in the bagning.     Uses lorazepam for anxiety once every 2 weeks.     Depression Followup    How are you doing with your depression since your last visit? Improved    Are you having other symptoms that might be associated with depression? No    Have you had a significant life event?  No     Are you feeling anxious or having panic attacks?   No    Do you have any concerns with  your use of alcohol or other drugs? No    Social History     Tobacco Use     Smoking status: Former Smoker     Packs/day: 1.00     Years: 3.00     Pack years: 3.00     Types: Cigarettes     Start date:      Quit date:      Years since quittin.3     Smokeless tobacco: Never Used   Substance Use Topics     Alcohol use: Yes     Comment: Socially     Drug use: No     PHQ 2021   PHQ-9 Total Score 12 2   Q9: Thoughts of better off dead/self-harm past 2 weeks Not at all Not at all     JOSE-7 SCORE 2019   Total Score - 6 (mild anxiety) 2 (minimal anxiety)   Total Score 0 6 2       Review of Systems   Constitutional, HEENT, cardiovascular, pulmonary, gi and gu systems are negative, except as otherwise noted.      Objective           Vitals:  No vitals were obtained today due to virtual visit.    Physical Exam   GENERAL: Healthy, alert and no distress  EYES: Eyes grossly normal to inspection.  No discharge or erythema, or obvious scleral/conjunctival abnormalities.  RESP: No audible wheeze, cough, or visible cyanosis.  No visible retractions or increased work of breathing.    SKIN: Visible skin clear. No significant rash, abnormal pigmentation or lesions.  NEURO: Cranial nerves grossly intact.  Mentation and speech appropriate for age.  PSYCH: Mentation appears normal, affect normal/bright, judgement and insight intact, normal speech and appearance well-groomed.        Video-Visit Details    Type of service:  Video Visit    Video End Time: 2:47 PM    Originating Location (pt. Location): Home    Distant Location (provider location):  [facility]     Platform used for Video Visit: Lockr

## 2021-04-14 ENCOUNTER — VIRTUAL VISIT (OUTPATIENT)
Dept: FAMILY MEDICINE | Facility: CLINIC | Age: 51
End: 2021-04-14
Payer: COMMERCIAL

## 2021-04-14 DIAGNOSIS — G47.00 INSOMNIA, UNSPECIFIED TYPE: ICD-10-CM

## 2021-04-14 DIAGNOSIS — F43.21 ADJUSTMENT DISORDER WITH DEPRESSED MOOD: ICD-10-CM

## 2021-04-14 PROCEDURE — 99214 OFFICE O/P EST MOD 30 MIN: CPT | Mod: 95 | Performed by: FAMILY MEDICINE

## 2021-04-14 RX ORDER — ESCITALOPRAM OXALATE 10 MG/1
10 TABLET ORAL DAILY
Qty: 90 TABLET | Refills: 1 | Status: SHIPPED | OUTPATIENT
Start: 2021-04-14 | End: 2021-10-13

## 2021-04-14 RX ORDER — LORAZEPAM 0.5 MG/1
0.5 TABLET ORAL
Qty: 30 TABLET | Refills: 1 | Status: SHIPPED | OUTPATIENT
Start: 2021-04-14 | End: 2022-05-25

## 2021-04-14 ASSESSMENT — ANXIETY QUESTIONNAIRES
7. FEELING AFRAID AS IF SOMETHING AWFUL MIGHT HAPPEN: NOT AT ALL
7. FEELING AFRAID AS IF SOMETHING AWFUL MIGHT HAPPEN: NOT AT ALL
GAD7 TOTAL SCORE: 2
2. NOT BEING ABLE TO STOP OR CONTROL WORRYING: SEVERAL DAYS
6. BECOMING EASILY ANNOYED OR IRRITABLE: SEVERAL DAYS
4. TROUBLE RELAXING: NOT AT ALL
5. BEING SO RESTLESS THAT IT IS HARD TO SIT STILL: NOT AT ALL
GAD7 TOTAL SCORE: 2
1. FEELING NERVOUS, ANXIOUS, OR ON EDGE: NOT AT ALL
3. WORRYING TOO MUCH ABOUT DIFFERENT THINGS: NOT AT ALL
GAD7 TOTAL SCORE: 2

## 2021-04-14 ASSESSMENT — PATIENT HEALTH QUESTIONNAIRE - PHQ9
SUM OF ALL RESPONSES TO PHQ QUESTIONS 1-9: 2
10. IF YOU CHECKED OFF ANY PROBLEMS, HOW DIFFICULT HAVE THESE PROBLEMS MADE IT FOR YOU TO DO YOUR WORK, TAKE CARE OF THINGS AT HOME, OR GET ALONG WITH OTHER PEOPLE: SOMEWHAT DIFFICULT
SUM OF ALL RESPONSES TO PHQ QUESTIONS 1-9: 2

## 2021-04-14 NOTE — PATIENT INSTRUCTIONS
After Visit Summary/Follow-up Plan  1) continue with your current dose of Lexapro  2) Follow-up in 6 months.     Thank you for allowing us the privilege of caring for you. We hope we provided you with the excellent service you deserve. I value your experience and would be very thankful for your time with providing feedback on our clinic survey. You may receive a survey via email or text message in the next few days.    Shayan Warren MD

## 2021-04-15 ASSESSMENT — PATIENT HEALTH QUESTIONNAIRE - PHQ9: SUM OF ALL RESPONSES TO PHQ QUESTIONS 1-9: 2

## 2021-04-15 ASSESSMENT — ANXIETY QUESTIONNAIRES: GAD7 TOTAL SCORE: 2

## 2021-04-16 ENCOUNTER — OFFICE VISIT (OUTPATIENT)
Dept: DERMATOLOGY | Facility: CLINIC | Age: 51
End: 2021-04-16
Attending: FAMILY MEDICINE
Payer: COMMERCIAL

## 2021-04-16 VITALS — SYSTOLIC BLOOD PRESSURE: 117 MMHG | DIASTOLIC BLOOD PRESSURE: 70 MMHG | OXYGEN SATURATION: 100 % | HEART RATE: 54 BPM

## 2021-04-16 DIAGNOSIS — L81.4 LENTIGO: ICD-10-CM

## 2021-04-16 DIAGNOSIS — L82.1 SEBORRHEIC KERATOSIS: ICD-10-CM

## 2021-04-16 DIAGNOSIS — D18.01 ANGIOMA OF SKIN: ICD-10-CM

## 2021-04-16 DIAGNOSIS — L71.9 ROSACEA: Primary | ICD-10-CM

## 2021-04-16 DIAGNOSIS — D22.9 NEVUS: ICD-10-CM

## 2021-04-16 PROCEDURE — 99204 OFFICE O/P NEW MOD 45 MIN: CPT | Performed by: PHYSICIAN ASSISTANT

## 2021-04-16 RX ORDER — METRONIDAZOLE 7.5 MG/G
GEL TOPICAL
Qty: 45 G | Refills: 11 | Status: SHIPPED | OUTPATIENT
Start: 2021-04-16 | End: 2023-04-06

## 2021-04-16 NOTE — PROGRESS NOTES
HPI:   Chief complaints: Letitia Yee is a pleasant 50 year old female who presents for Full skin cancer screening to rule out skin cancer   Last Skin Exam: n/a      1st Baseline: yes  Personal HX of Skin Cancer: no   Personal HX of Malignant Melanoma: no   Family HX of Skin Cancer / Malignant Melanoma: yes brother with a history of skin cancer  Personal HX of Atypical Moles:   no  Risk factors: history of sun exposure and burns  New / Changing lesions:yes red spot on the face  Social History:   On review of systems, there are no further skin complaints, patient is feeling otherwise well.  See patient intake sheet.  ROS of the following were done and are negative: Constitutional, Eyes, Ears, Nose,   Mouth, Throat, Cardiovascular, Respiratory, GI, Genitourinary, Musculoskeletal,   Psychiatric, Endocrine, Allergic/Immunologic.    PHYSICAL EXAM:   /70   Pulse 54   SpO2 100%   Breastfeeding No   Skin exam performed as follows: Type 2 skin. Mood appropriate  Alert and Oriented X 3. Well developed, well nourished in no distress.  General appearance: Normal  Head including face: Normal  Eyes: conjunctiva and lids: Normal  Mouth: Lips, teeth, gums: Normal  Neck: Normal  Chest-breast/axillae: Normal  Back: Normal  Spleen and liver: Normal  Cardiovascular: Exam of peripheral vascular system by observation for swelling, varicosities, edema: Normal  Genitalia: groin, buttocks: Normal  Extremities: digits/nails (clubbing): Normal  Eccrine and Apocrine glands: Normal  Right upper extremity: Normal  Left upper extremity: Normal  Right lower extremity: Normal  Left lower extremity: Normal  Skin: Scalp and body hair: See below    Pt deferred exam of breasts, groin, buttocks: No    Other physical findings:  1. Multiple pigmented macules on extremities and trunk  2. Multiple pigmented macules on face, trunk and extremities  3. Multiple vascular papules on trunk, arms and legs  4. Multiple scattered keratotic  plaques  5. Papules and pustules with background erythema on face         Except as noted above, no other signs of skin cancer or melanoma.     ASSESSMENT/PLAN:   Benign Full skin cancer screening today. . Patient with history of none  Advised on monthly self exams and 1 year  Patient Education: Appropriate brochures given.    1. Multiple benign appearing nevi on arms, legs and trunk. Discussed ABCDEs of melanoma and sunscreen.   2. Multiple lentigos on arms, legs and trunk. Advised benign, no treatment needed.  3. Multiple scattered angiomas. Advised benign, no treatment needed.   4. Seborrheic keratosis on arms, legs and trunk. Advised benign, no treatment needed.  5. Acne Rosacea - advised on diagnosis and treatment options. Discussed chronic condition of unknown etiology. Discussed anti-inflammatories, sunscreen, PO and topical medications.   --Start metrogel BID              Follow-up: yearly FSE/PRN sooner    1.) Patient was asked about new and changing moles. YES  2.) Patient received a complete physical skin examination: YES  3.) Patient was counseled to perform a monthly self skin examination: YES  Scribed By: Geovanna Haro, MS, PA-C

## 2021-04-16 NOTE — LETTER
4/16/2021         RE: Letitia Yee  [address] [facility] [address]        Dear Colleague,    Thank you for referring your patient, Letitia Yee, to the [facility]. Please see a copy of my visit note below.    HPI:   Chief complaints: Letitia Yee is a pleasant 50 year old female who presents for Full skin cancer screening to rule out skin cancer   Last Skin Exam: n/a      1st Baseline: yes  Personal HX of Skin Cancer: no   Personal HX of Malignant Melanoma: no   Family HX of Skin Cancer / Malignant Melanoma: yes brother with a history of skin cancer  Personal HX of Atypical Moles:   no  Risk factors: history of sun exposure and burns  New / Changing lesions:yes red spot on the face  Social History:   On review of systems, there are no further skin complaints, patient is feeling otherwise well.  See patient intake sheet.  ROS of the following were done and are negative: Constitutional, Eyes, Ears, Nose,   Mouth, Throat, Cardiovascular, Respiratory, GI, Genitourinary, Musculoskeletal,   Psychiatric, Endocrine, Allergic/Immunologic.    PHYSICAL EXAM:   /70   Pulse 54   SpO2 100%   Breastfeeding No   Skin exam performed as follows: Type 2 skin. Mood appropriate  Alert and Oriented X 3. Well developed, well nourished in no distress.  General appearance: Normal  Head including face: Normal  Eyes: conjunctiva and lids: Normal  Mouth: Lips, teeth, gums: Normal  Neck: Normal  Chest-breast/axillae: Normal  Back: Normal  Spleen and liver: Normal  Cardiovascular: Exam of peripheral vascular system by observation for swelling, varicosities, edema: Normal  Genitalia: groin, buttocks: Normal  Extremities: digits/nails (clubbing): Normal  Eccrine and Apocrine glands: Normal  Right upper extremity: Normal  Left upper extremity: Normal  Right lower extremity: Normal  Left lower extremity: Normal  Skin: Scalp and body hair: See below    Pt deferred  exam of breasts, groin, buttocks: No    Other physical findings:  1. Multiple pigmented macules on extremities and trunk  2. Multiple pigmented macules on face, trunk and extremities  3. Multiple vascular papules on trunk, arms and legs  4. Multiple scattered keratotic plaques  5. Papules and pustules with background erythema on face         Except as noted above, no other signs of skin cancer or melanoma.     ASSESSMENT/PLAN:   Benign Full skin cancer screening today. . Patient with history of none  Advised on monthly self exams and 1 year  Patient Education: Appropriate brochures given.    1. Multiple benign appearing nevi on arms, legs and trunk. Discussed ABCDEs of melanoma and sunscreen.   2. Multiple lentigos on arms, legs and trunk. Advised benign, no treatment needed.  3. Multiple scattered angiomas. Advised benign, no treatment needed.   4. Seborrheic keratosis on arms, legs and trunk. Advised benign, no treatment needed.  5. Acne Rosacea - advised on diagnosis and treatment options. Discussed chronic condition of unknown etiology. Discussed anti-inflammatories, sunscreen, PO and topical medications.   --Start metrogel BID              Follow-up: yearly FSE/PRN sooner    1.) Patient was asked about new and changing moles. YES  2.) Patient received a complete physical skin examination: YES  3.) Patient was counseled to perform a monthly self skin examination: YES  Scribed By: Geovanna Haro, MS, PAYASMANI          Again, thank you for allowing me to participate in the care of your patient.        Sincerely,        Geovanna Haro PA-C

## 2021-04-16 NOTE — PATIENT INSTRUCTIONS
For the rosacea, apply metrogel twice per day     For makeups:     [place]  [employer] (ulta or tamera)  [place]

## 2021-05-03 ENCOUNTER — IMMUNIZATION (OUTPATIENT)
Dept: NURSING | Facility: CLINIC | Age: 51
End: 2021-05-03
Attending: INTERNAL MEDICINE
Payer: COMMERCIAL

## 2021-05-03 PROCEDURE — 91300 PR COVID VAC PFIZER DIL RECON 30 MCG/0.3 ML IM: CPT

## 2021-05-03 PROCEDURE — 0002A PR COVID VAC PFIZER DIL RECON 30 MCG/0.3 ML IM: CPT

## 2021-05-20 ENCOUNTER — MYC MEDICAL ADVICE (OUTPATIENT)
Dept: FAMILY MEDICINE | Facility: CLINIC | Age: 51
End: 2021-05-20

## 2021-05-20 DIAGNOSIS — M25.562 ACUTE PAIN OF LEFT KNEE: Primary | ICD-10-CM

## 2021-06-09 ENCOUNTER — OFFICE VISIT (OUTPATIENT)
Dept: ORTHOPEDICS | Facility: CLINIC | Age: 51
End: 2021-06-09
Payer: COMMERCIAL

## 2021-06-09 ENCOUNTER — ANCILLARY PROCEDURE (OUTPATIENT)
Dept: GENERAL RADIOLOGY | Facility: CLINIC | Age: 51
End: 2021-06-09
Attending: FAMILY MEDICINE
Payer: COMMERCIAL

## 2021-06-09 VITALS
SYSTOLIC BLOOD PRESSURE: 110 MMHG | WEIGHT: 137 LBS | HEIGHT: 64 IN | DIASTOLIC BLOOD PRESSURE: 78 MMHG | BODY MASS INDEX: 23.39 KG/M2

## 2021-06-09 DIAGNOSIS — M25.562 ACUTE PAIN OF LEFT KNEE: Primary | ICD-10-CM

## 2021-06-09 DIAGNOSIS — M79.645 CHRONIC PAIN OF LEFT THUMB: ICD-10-CM

## 2021-06-09 DIAGNOSIS — M25.562 LEFT KNEE PAIN: ICD-10-CM

## 2021-06-09 DIAGNOSIS — G89.29 CHRONIC PAIN OF LEFT THUMB: ICD-10-CM

## 2021-06-09 PROCEDURE — 99204 OFFICE O/P NEW MOD 45 MIN: CPT | Performed by: FAMILY MEDICINE

## 2021-06-09 PROCEDURE — 73562 X-RAY EXAM OF KNEE 3: CPT | Performed by: RADIOLOGY

## 2021-06-09 ASSESSMENT — MIFFLIN-ST. JEOR: SCORE: 1221.43

## 2021-06-09 NOTE — PATIENT INSTRUCTIONS
Thank you for choosing [facility]    DR HERRERA[facility]S CLINIC LOCATIONS  [place] [address] [address] [facility], 4th [place], [address] [place] [address]   [phone]       APPOINTMENTS: [phone]    BILLING QUESTIONS: [phone]    FAX NUMBER: [phone]        Follow up: 4 weeks to evaluate left shoulder and follow up on left hand and left knee      1. Acute pain of left knee        Physical Therapy orders have been placed with [facility].  You can call [phone] to schedule at your convenience.           ITB Syndrome Instructions      What is ITB Syndrome?  -IT stands for Ilio-Tibial band- it is a ligamentous band that stretches from hip to below knee  -Pain can at any point along the band but is most commonly at one end or the other  -If at hip, pain may radiate down leg usually stops at knee or just below  - knee pain can be anywhere on out side of knee down into leg  -pain may be worse with sitting, running, or laying on side      Treatment:  -Ice 20 minutes after activity or when painful. (Ice cups for massage 5-7min)  -Ice and NSAIDs help decrease inflammation. A good anti-inflammatory NSAID medication is Alleve (220mg). Take 1-2 tabs twice daily with food until pain improves or minimum of 14 days, then as needed for pain. (1-2 tabs twice daily dosing is for patients over 12 years old. Timbo than 11 yo, check dose with doctor.)  -only gentle stretching of leg muscles when area is  to touch  -may increase stretching exercises when non-tender and start strengthening exercises as instructed or per physical therapy instruction  -Foam Rolling daily with foam roller  -Cross-train (biking, swimming, or elliptical are good for cardio)  -weight lifting as tolerated- remember to keep your knees behind your toes (don't bend knee more than 90 degrees)  -regular core exercises (yoga and  pilates)  -often component of hip weakness that leads to lower extremity (foot, ankle, shin, and knee) problems so a lot of focus will be on core strength and balance  - recommend yoga for core strengthening and stretching          Strength Exercises:  Perform exercises as instructed through formal therapy.  Until then start with the followin) balance on one foot 1-2 min daily  2) 4 way hip: tie theraband around ankle and balance on other foot while doing15 reps each direction of straight leg motion  3) bridge- lay on back with knee bent, feet flat on floor: lift hips and hold 5sec x20  -Manual therapy techniques include   -foam roller   -therapeutic massage  -usually takes several weeks before pain free but longer before return to full activity without pain  --Once released to increase activity, BE PATIENT!    Return to activity guidelines include:  -if it hurts, don't do it  -start gradually and build up, wait 24 hours before increase intensity and time    Running:   -20 min on treadmill  -Start walk 4 min/run 1 min and repeat 3 times.   -If pain free for 48 hours, increase to walk 3 min/run 2 min.   -Continue to increase as such as pain allows  -If you develop pain, back off to previous pain-free level and wait 1 week before increasing again.  -Follow-up in 6 weeks if not improved or sooner if further concern.  -If problem flares again after resolved, restart icing, and exercises.    If no improvement or pain continues to return with activity, other options include:  1. formal physical therapy if not started  2. Active Release Therapy  3. Other medications to enhance bloodflow and improve healing      You may do all of these exercises right away.    Iliotibial Band Syndrome Exercises    Iliotibial band stretch, standing: Cross your uninjured leg in front of the other leg and bend down and reach toward the inside of your back foot. Do not bend your knees. Hold this position for 15 to 30 seconds. Return to the  starting position. Repeat 3 times.    Iliotibial band stretch, side-leaning: Stand sideways near a wall with your injured side closest to the wall. Place a hand on the wall for support. Cross the leg farther from the wall over the other leg. Keep the foot closest to the wall flat on the floor. Lean your hips into the wall. Hold the stretch for 15 to 30 seconds. Repeat 3 times.    Iliotibial band stretch, side-bending: Cross one leg in front of the other leg and lean in the opposite direction from the front leg. Reach the arm on the side of the back leg over your head while you do this. Hold this position for 15 to 30 seconds. Return to the starting position. Repeat 3 times and then switch legs and repeat the exercise.    Standing calf stretch: Stand facing a wall with your hands on the wall at about eye level. Keep your injured leg back with your heel on the floor. Keep the other leg forward with the knee bent. Turn your back foot slightly inward (as if you were pigeon-toed). Slowly lean into the wall until you feel a stretch in the back of your calf. Hold the stretch for 15 to 30 seconds. Return to the starting position. Repeat 3 times. Do this exercise several times each day.    Hamstring stretch on wall: Lie on your back with your buttocks close to a doorway. Stretch your uninjured leg straight out in front of you on the floor through the doorway. Raise your injured leg and rest it against the wall next to the door frame. Keep your leg as straight as possible. You should feel a stretch in the back of your thigh. Hold this position for 15 to 30 seconds. Repeat 3 times.    Quadriceps stretch: Stand at an arm's length away from the wall with your injured side farthest from the wall. Facing straight ahead, brace yourself by keeping one hand against the wall. With your other hand, grasp the ankle on your injured side and pull your heel toward your buttocks. Don't arch or twist your back. Keep your knees together. Hold  this stretch for 15 to 30 seconds.    Side-lying leg lift: Lie on your uninjured side. Tighten the front thigh muscles on your injured leg and lift that leg 8 to 10 inches (20 to 25 centimeters) away from the other leg. Keep the leg straight and lower it slowly. Do 2 sets of 15.    Knee stabilization: Wrap a piece of elastic tubing around the ankle of your uninjured leg. Tie a knot in the other end of the tubing and close it in a door at about ankle height.  Stand facing the door on the leg without tubing (your injured leg) and bend your knee slightly, keeping your thigh muscles tight. Stay in this position while you move the leg with the tubing (the uninjured leg) straight back behind you. Do 2 sets of 15.  Turn 90 degrees so the leg without tubing is closest to the door. Move the leg with tubing away from your body. Do 2 sets of 15.    Turn 90 degrees again so your back is to the door. Move the leg with tubing straight out in front of you. Do 2 sets of 15.    Turn your body 90 degrees again so the leg with tubing is closest to the door. Move the leg with tubing across your body. Do 2 sets of 15.    Hold onto a chair if you need help balancing. This exercise can be made more challenging by standing on a firm pillow or foam mat while you move the leg with tubing.    Clam exercise: Lie on your uninjured side with your hips and knees bent and feet together. Slowly raise your top leg toward the ceiling while keeping your heels touching each other. Hold for 2 seconds and lower slowly. Do 2 sets of 15 repetitions.    Prone hip extension: Lie on your stomach with your legs straight out behind you. Fold your arms under your head and rest your head on your arms. Draw your belly button in towards your spine and tighten your abdominal muscles. Tighten the buttocks and thigh muscles of the leg on your injured side and lift the leg off the floor about 8 inches. Keep your leg straight. Hold for 5 seconds. Then lower your leg and  relax. Do 2 sets of 15.          Developed by [employer].  Published by [employer].  Copyright  2014 [employer] and/or one of its subsidiaries. All rights reserved.

## 2021-06-09 NOTE — LETTER
6/9/2021         RE: Letitia Yee  [address] [facility] [address]        Dear Colleague,    Thank you for referring your patient, Letitia Yee, to the [facility] [place]. Please see a copy of my visit note below.    CHIEF COMPLAINT:  Pain of the Left Knee       HISTORY OF PRESENT ILLNESS  Ms. Yee is a pleasant 50 year old year old female who presents to clinic today with left knee pain.  Letitia explains that she is an avid biker, and she has noticed pain in the outside of her knee when she bikes and exercises, although walking and running don't bother her. Notes worst with knee flexion.   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     Onset: sudden, intermittent, worsening  Location: left lateral knee  Quality:  aching and sharp  Duration: 1 year, worsening in the past ~4 months   Severity: 6/10 at worst  Timing: intermittent episodes with activities noted below  Modifying factors: worse with biking, stairs, or sitting to standing, resting and non-use makes it better, movement and use makes it worse  Associated signs & symptoms: denies feeling of instability, clicking, popping, or weakness in leg.   Previous similar pain: No  Treatments to date: denies    Additional history: as documented    Review of Systems:    Have you recently had a a fever, chills, weight loss? No    Do you have any vision problems? Yes - blurry vision.     Do you have any chest pain or edema? No    Do you have any shortness of breath or wheezing?  No    Do you have stomach problems? Yes - stomach ache last 2 days    Do you have any numbness or focal weakness? No - arms fall asleep easily.     Do you have diabetes? No    Do you have problems with bleeding or clotting? No    Do you have an rashes or other skin lesions? No    MEDICAL HISTORY  Patient Active Problem List   Diagnosis     Hordeolum  "externum of right lower eyelid     Migraine with aura and without status migrainosus, not intractable     Adjustment disorder with anxious mood     Insomnia, unspecified type     Mitral valve prolapse     Mitral regurgitation       Current Outpatient Medications   Medication Sig Dispense Refill     acyclovir (ZOVIRAX) 400 MG tablet TK 1 T PO  BID. TK 1 T PO BID FOR 5 DAYS FOR EACH OUTBREAK  1     albuterol (PROAIR HFA/PROVENTIL HFA/VENTOLIN HFA) 108 (90 Base) MCG/ACT inhaler Inhale 2 puffs into the lungs every 4 hours as needed for shortness of breath / dyspnea or wheezing 1 Inhaler 5     cyanocobalamin (VITAMIN B-12) 1000 MCG tablet Take by mouth daily       escitalopram (LEXAPRO) 10 MG tablet Take 1 tablet (10 mg) by mouth daily 90 tablet 1     levonorgestrel (MIRENA) 20 MCG/24HR IUD 1 each by Intrauterine route once       LORazepam (ATIVAN) 0.5 MG tablet Take 1 tablet (0.5 mg) by mouth nightly as needed for agitation, anxiety or sleep 30 tablet 1     metroNIDAZOLE (METROGEL) 0.75 % external gel Apply to face BID 45 g 11     neomycin-polymyxin-hydrocortisone (CORTISPORIN) 3.5-33208-9 otic suspension Place 3 drops Into the left ear 4 times daily (Patient not taking: Reported on 6/9/2021) 1 Bottle 1       Allergies   Allergen Reactions     Diphenhydramine Other (See Comments)     PN: Numbness and tingling.- 04/16/21: States she can take this     Seasonal Allergies      Adhesive Tape Rash       Family History   Problem Relation Age of Onset     Arthritis Mother      Depression Mother      Genitourinary Problems Mother      Heart Defect Father      Hypertension Father      Hyperlipidemia Father      Alcoholism Sister      Depression Sister      Mental Illness Sister      Alcoholism Brother      Depression Brother      Asthma Maternal Grandfather      Skin Cancer Brother        Additional medical/Social/Surgical histories reviewed in [facility] and updated as appropriate.       PHYSICAL EXAM  /78   Ht 1.626 m (5' 4\")  "  Wt 62.1 kg (137 lb)   BMI 23.52 kg/m      General  - normal appearance, in no obvious distress  HEENT  - conjunctivae not injected, moist mucous membranes  CV  - normal popliteal pulse  Pulm  - normal respiratory pattern, non-labored  Musculoskeletal - left knee  - stance: normal gait without limp, no obvious leg length discrepancy, single-leg squat reproduces pain, exhibits knee valgus, internal rotation of the hip, contralateral hip drop  - inspection: no obvious swelling, normal muscle tone, normal bone and joint alignment, no obvious deformity  - palpation: tender over anterior lateral femoral condyle  - ROM: 135 degrees flexion, -5 degrees extension, mildly painful active flexion  - strength: 5/5 in flexion, 5/5 in extension, 4/5 hip abduction  - neuro: no sensory or motor deficit  - special tests:  (-) Lachman  (-) anterior drawer  (-) Tyrone  (-) Thessaly  (+) Wero mildly positive  (-) varus at 0 and 30 degrees flexiona  (-) valgus at 0 and 30 degrees flexion  (-) Abdiel s compression test  (-) patellar apprehension    Musculoskeletal - Left thumb  - inspection: no atrophy, normal joint alignment, no swelling  - palpation: CMC tenderness, no soft tissue tenderness, no tenderness at the anatomical snuffbox  - ROM:  MCP 70 deg flexion   0 deg extension   IP 90 deg flexion   0 deg extension  - strength: 5/5  strength, 5/5 wrist abduction, 5/5 flexion, extension, pronation, supination, adduction  - special tests:  (-) varus  (-) valgus  Neuro  - no numbness, no motor deficit, grossly normal coordination, normal muscle tone  Skin  - no ecchymosis, erythema, warmth, or induration, no obvious rash  Psych  - interactive, appropriate, normal mood and affect    IMAGING : XR left knee 3V. Final results and radiologist's interpretation, available in the [facility] health record. Images were reviewed with the patient/family members in the office today. My personal interpretation of the performed imaging is no acute  osseous abnormality or significant degenerative changes of joint.       ASSESSMENT & PLAN  Ms. Yee is a 50 year old year old female who presents to clinic today with subacute left lateral knee pain with cycling and stairs.  Most consistent with distal IT band friction syndrome.  Ligamentously stable and no provocative meniscus test today.  At this time we discussed starting with a foam roll for stretching, strengthening program at the knee as well as at the hip as well as icing, heat and analgesics as needed.  She will hold off her biking return slowly and agree to return in 2 weeks.    Left thumb pain is most consistent with CMC arthralgia.      Diagnosis:   1.  Acute pain of left knee  2.  IT band syndrome left knee  3.  Pain of CMC joint of left thumb    I would like her to check back in in 3 to 4 weeks.  He may discuss her shoulder which she would like to have looked out as well.  She has no improvement over the next 3 to 4 weeks with home exercise program, formal PT for her left IT band/knee would be recommended.  Return to biking program may be implemented with PT oversight.    Regarding left thumb, X-rays will be helpful to determine whether significant osteoarthritic change is present - can order in future.  In the meantime she may start diclofenac gel, Comfort Cool thumb brace as well as occupational therapy.    It was a pleasure seeing Letitia today.    Carlos Stone DO, [facility]  Primary Care Sports Medicine        Again, thank you for allowing me to participate in the care of your patient.        Sincerely,        Carlos Stone DO

## 2021-06-09 NOTE — PROGRESS NOTES
CHIEF COMPLAINT:  Pain of the Left Knee       HISTORY OF PRESENT ILLNESS  Ms. Yee is a pleasant 50 year old year old female who presents to clinic today with left knee pain.  Letitia explains that she is an avid biker, and she has noticed pain in the outside of her knee when she bikes and exercises, although walking and running don't bother her. Notes worst with knee flexion.   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     Onset: sudden, intermittent, worsening  Location: left lateral knee  Quality:  aching and sharp  Duration: 1 year, worsening in the past ~4 months   Severity: 6/10 at worst  Timing: intermittent episodes with activities noted below  Modifying factors: worse with biking, stairs, or sitting to standing, resting and non-use makes it better, movement and use makes it worse  Associated signs & symptoms: denies feeling of instability, clicking, popping, or weakness in leg.   Previous similar pain: No  Treatments to date: denies    Additional history: as documented    Review of Systems:    Have you recently had a a fever, chills, weight loss? No    Do you have any vision problems? Yes - blurry vision.     Do you have any chest pain or edema? No    Do you have any shortness of breath or wheezing?  No    Do you have stomach problems? Yes - stomach ache last 2 days    Do you have any numbness or focal weakness? No - arms fall asleep easily.     Do you have diabetes? No    Do you have problems with bleeding or clotting? No    Do you have an rashes or other skin lesions? No    MEDICAL HISTORY  Patient Active Problem List   Diagnosis     Hordeolum externum of right lower eyelid     Migraine with aura and without status migrainosus, not intractable     Adjustment disorder with anxious mood     Insomnia, unspecified type     Mitral valve prolapse     Mitral regurgitation       Current Outpatient Medications   Medication  "Sig Dispense Refill     acyclovir (ZOVIRAX) 400 MG tablet TK 1 T PO  BID. TK 1 T PO BID FOR 5 DAYS FOR EACH OUTBREAK  1     albuterol (PROAIR HFA/PROVENTIL HFA/VENTOLIN HFA) 108 (90 Base) MCG/ACT inhaler Inhale 2 puffs into the lungs every 4 hours as needed for shortness of breath / dyspnea or wheezing 1 Inhaler 5     cyanocobalamin (VITAMIN B-12) 1000 MCG tablet Take by mouth daily       escitalopram (LEXAPRO) 10 MG tablet Take 1 tablet (10 mg) by mouth daily 90 tablet 1     levonorgestrel (MIRENA) 20 MCG/24HR IUD 1 each by Intrauterine route once       LORazepam (ATIVAN) 0.5 MG tablet Take 1 tablet (0.5 mg) by mouth nightly as needed for agitation, anxiety or sleep 30 tablet 1     metroNIDAZOLE (METROGEL) 0.75 % external gel Apply to face BID 45 g 11     neomycin-polymyxin-hydrocortisone (CORTISPORIN) 3.5-66151-1 otic suspension Place 3 drops Into the left ear 4 times daily (Patient not taking: Reported on 6/9/2021) 1 Bottle 1       Allergies   Allergen Reactions     Diphenhydramine Other (See Comments)     PN: Numbness and tingling.- 04/16/21: States she can take this     Seasonal Allergies      Adhesive Tape Rash       Family History   Problem Relation Age of Onset     Arthritis Mother      Depression Mother      Genitourinary Problems Mother      Heart Defect Father      Hypertension Father      Hyperlipidemia Father      Alcoholism Sister      Depression Sister      Mental Illness Sister      Alcoholism Brother      Depression Brother      Asthma Maternal Grandfather      Skin Cancer Brother        Additional medical/Social/Surgical histories reviewed in [facility] and updated as appropriate.       PHYSICAL EXAM  /78   Ht 1.626 m (5' 4\")   Wt 62.1 kg (137 lb)   BMI 23.52 kg/m      General  - normal appearance, in no obvious distress  HEENT  - conjunctivae not injected, moist mucous membranes  CV  - normal popliteal pulse  Pulm  - normal respiratory pattern, non-labored  Musculoskeletal - left knee  - " stance: normal gait without limp, no obvious leg length discrepancy, single-leg squat reproduces pain, exhibits knee valgus, internal rotation of the hip, contralateral hip drop  - inspection: no obvious swelling, normal muscle tone, normal bone and joint alignment, no obvious deformity  - palpation: tender over anterior lateral femoral condyle  - ROM: 135 degrees flexion, -5 degrees extension, mildly painful active flexion  - strength: 5/5 in flexion, 5/5 in extension, 4/5 hip abduction  - neuro: no sensory or motor deficit  - special tests:  (-) Lachman  (-) anterior drawer  (-) Tyrone  (-) Thessaly  (+) Wero mildly positive  (-) varus at 0 and 30 degrees flexiona  (-) valgus at 0 and 30 degrees flexion  (-) Abdiel s compression test  (-) patellar apprehension    Musculoskeletal - Left thumb  - inspection: no atrophy, normal joint alignment, no swelling  - palpation: CMC tenderness, no soft tissue tenderness, no tenderness at the anatomical snuffbox  - ROM:  MCP 70 deg flexion   0 deg extension   IP 90 deg flexion   0 deg extension  - strength: 5/5  strength, 5/5 wrist abduction, 5/5 flexion, extension, pronation, supination, adduction  - special tests:  (-) varus  (-) valgus  Neuro  - no numbness, no motor deficit, grossly normal coordination, normal muscle tone  Skin  - no ecchymosis, erythema, warmth, or induration, no obvious rash  Psych  - interactive, appropriate, normal mood and affect    IMAGING : XR left knee 3V. Final results and radiologist's interpretation, available in the [facility] health record. Images were reviewed with the patient/family members in the office today. My personal interpretation of the performed imaging is no acute osseous abnormality or significant degenerative changes of joint.       ASSESSMENT & PLAN  Ms. Yee is a 50 year old year old female who presents to clinic today with subacute left lateral knee pain with cycling and stairs.  Most consistent with distal IT band  friction syndrome.  Ligamentously stable and no provocative meniscus test today.  At this time we discussed starting with a foam roll for stretching, strengthening program at the knee as well as at the hip as well as icing, heat and analgesics as needed.  She will hold off her biking return slowly and agree to return in 2 weeks.    Left thumb pain is most consistent with CMC arthralgia.      Diagnosis:   1.  Acute pain of left knee  2.  IT band syndrome left knee  3.  Pain of CMC joint of left thumb    I would like her to check back in in 3 to 4 weeks.  He may discuss her shoulder which she would like to have looked out as well.  She has no improvement over the next 3 to 4 weeks with home exercise program, formal PT for her left IT band/knee would be recommended.  Return to biking program may be implemented with PT oversight.    Regarding left thumb, X-rays will be helpful to determine whether significant osteoarthritic change is present - can order in future.  In the meantime she may start diclofenac gel, Comfort Cool thumb brace as well as occupational therapy.    It was a pleasure seeing Letitia today.    Carlos Stone DO, CAQ[facility]  Primary Care Sports Medicine

## 2021-07-19 ENCOUNTER — MYC MEDICAL ADVICE (OUTPATIENT)
Dept: FAMILY MEDICINE | Facility: CLINIC | Age: 51
End: 2021-07-19

## 2021-07-19 DIAGNOSIS — B00.1 COLD SORE: Primary | ICD-10-CM

## 2021-07-20 RX ORDER — VALACYCLOVIR HYDROCHLORIDE 1 G/1
2000 TABLET, FILM COATED ORAL 2 TIMES DAILY
Qty: 4 TABLET | Refills: 0 | Status: SHIPPED | OUTPATIENT
Start: 2021-07-20 | End: 2022-01-07 | Stop reason: ALTCHOICE

## 2021-09-05 ENCOUNTER — HEALTH MAINTENANCE LETTER (OUTPATIENT)
Age: 51
End: 2021-09-05

## 2021-09-14 NOTE — PROGRESS NOTES
Hand Therapy Initial Evaluation  Current Date:  9/20/2021    Diagnosis: Left Thumb Pain  DOI: 01/2021  Post: 9 months since recent onset of pain    Precautions: N/A    Subjective:  Letitia Yee is a 50 year old female.    Patient reports symptoms of the left thumb which occurred due to overuse. Since onset symptoms are Gradually getting worse.  General health as reported by patient is excellent.  Pertinent medical history includes:Asthma, Concussions/Dizziness, Depression, Menopausal, Migraines/Headaches, Smoking  Medical allergies: Adhesives.  Surgical history: none.  Medication history: Anti-depressants.    Current occupation is Writer  Job Tasks: Computer Work, Prolonged Sitting    Occupational Profile Information:  Right hand dominant  Prior functional level:  no limitations  Patient reports symptoms of pain  Special tests:  none.    Previous treatment: massage, rest  Barriers include:none  Mobility: No difficulty  Transportation: drives  Currently working in normal job without restrictions  Leisure activities/hobbies: piano, running, biking, playing with and walking dog, dancing, playing cards  Other: Lives in home, responsible for most yard work, has 11 year old son    Functional Outcome Measure:   Upper Extremity Functional Index Score:  SCORE:   Column Totals: /80: 73   (A lower score indicates greater disability.)    Objective:  Pain Level (Scale 0-10)   9/20/2021   At Rest 0   With Use 2     Pain Description  Date 9/20/2021   Location Left thumb - CMC, thenars   Pain Quality Aching   Frequency intermittent or daily     Pain is worst  daytime   Exacerbated by Playing piano, opening jars, pinching, gripping   Relieved by Rest, unsure   Progression Worsening over the past 9 months     ROM  Thumb 9/20/2021 9/20/2021   AROM  (PROM) Right Left   MP -15/55 -10/65   IP 0/54 0/74   RABD 62 62   PABD 62 60     Thumb Observation/Appearance   - none  + mild    ++ moderate    +++ severe     9/20/2021   Shoulder  deformity present over CMC R: +  L: +   Edema over the CMC joint R: -  L: -   Noted collapse of MP into hyperextension during pinch R: +  L: -      Provocative Tests  Pain Report:  - none    + mild    ++ moderate    +++ severe     9/20/2021   Crepitus present R: -  L: -   CMC Adduction Stress Test R: -  L: +   CMC Extension Stress Test R: -  L: +   Finkelstein's R: -  L: -       Strength   (Measured in pounds)  Pain Report: - none  + mild    ++ moderate    +++ severe    9/20/2021 9/20/2021   Trials Right Left   1  2  3 66 72   Average 66 72     Lat Pinch 9/20/2021 9/20/2021   Trials Right Left   1  2  3 15 14-   Average 15 14     3 Pt Pinch 9/20/2021 9/20/2021   Trials Right Left   1  2  3 13 19-   Average 13 19     Palpation   Pain Report:  - none    + mild    ++ moderate    +++ severe    9/20/2021   CMC Joint Line R: -  L: +   Thenar Eminence R: -  L: +   Web Space R: -  L: -   1st DC R: -  L: -   Radial Styloid R: -  L: -   FCR R: -  L: -     Assessment:  Patient presents with symptoms consistent with diagnosis of left thumb pain, with conservative intervention.    Patient's limitations or Problem List includes:  Pain, Hypermobility, Decreased , Decreased pinch and Tightness in musculature of the left thumb which interferes with the patient's ability to perform Self Care Tasks (hygiene/toileting), Recreational Activities and Household Chores as compared to previous level of function.    Rehab Potential:  Good - Return to full activity, some limitations    Patient will benefit from skilled Occupational Therapy to increase stability of thumb and decrease pain to return to previous activity level and resume normal daily tasks and to reach their rehab potential.    Barriers to Learning:  No barrier    Communication Issues:  Patient appears to be able to clearly communicate and understand verbal and written communication and follow directions correctly.    Chart Review: Chart Review and Detailed history review  with patient    Identified Performance Deficits: home establishment and management, meal preparation and cleanup and leisure activities    Assessment of Occupational Performance:  3-5 Performance Deficits    Clinical Decision Making (Complexity): Low complexity    Treatment Explanation:  The following has been discussed with the patient:    RX ordered/plan of care  Anticipated outcomes  Possible risks and side effects    Plan:  Frequency:  1 X week, once daily  Duration:  for 4 visits    Treatment Plan:    Modalities:    US, Paraffin and Laser Light   Therapeutic Exercise:    Stabilization  Therapeutic Activities:   Functional activities   Manual Techniques:   Joint mobilization and Myofascial release  Orthotic Fabrication:    Hand based  Self Care:    Self Care Tasks    Discharge Plan:  Achieve all LTG  [place] in home treatment program.  Reach maximal therapeutic benefit.    Home Program:  L HBTSS  Warmth - Epsom Salt Soaks  Adductor release with clip  STM to thenars with ball  Self joint mobs on chest    Next Visit:  Check splint, introduce comfort cool  Heat - paraffin trial  STM  Progress thumb stability - 1st DI strengthening, isometric C, place and hold pinch  Future visits: AE/JPE education

## 2021-09-20 ENCOUNTER — THERAPY VISIT (OUTPATIENT)
Dept: OCCUPATIONAL THERAPY | Facility: CLINIC | Age: 51
End: 2021-09-20
Payer: COMMERCIAL

## 2021-09-20 DIAGNOSIS — G89.29 CHRONIC PAIN OF LEFT THUMB: ICD-10-CM

## 2021-09-20 DIAGNOSIS — M79.645 CHRONIC PAIN OF LEFT THUMB: ICD-10-CM

## 2021-09-20 PROCEDURE — 97760 ORTHOTIC MGMT&TRAING 1ST ENC: CPT | Mod: GO | Performed by: OCCUPATIONAL THERAPIST

## 2021-09-20 PROCEDURE — 97140 MANUAL THERAPY 1/> REGIONS: CPT | Mod: GO | Performed by: OCCUPATIONAL THERAPIST

## 2021-09-20 PROCEDURE — 97165 OT EVAL LOW COMPLEX 30 MIN: CPT | Mod: GO | Performed by: OCCUPATIONAL THERAPIST

## 2021-09-20 NOTE — PROGRESS NOTES
Please refer to the daily flowsheet for treatment today, total treatment time and time spent performing 1:1 timed codes.       Home Program:  L HBTSS  Recommend comfort cool, size L Small +  Warmth - Epsom Salt Soaks  Adductor release with clip  STM to thenars with ball  Self joint mobs on chest  1st DI strengthening     Next Visit:  Heat - paraffin   STM  Progress thumb stability - isometric C, place and hold pinch   AE/JPE education  Next visit: 1 month out

## 2021-09-21 DIAGNOSIS — Z13.220 SCREENING FOR HYPERLIPIDEMIA: ICD-10-CM

## 2021-09-21 DIAGNOSIS — I34.1 MITRAL VALVE PROLAPSE: Primary | ICD-10-CM

## 2021-09-27 ENCOUNTER — THERAPY VISIT (OUTPATIENT)
Dept: OCCUPATIONAL THERAPY | Facility: CLINIC | Age: 51
End: 2021-09-27
Payer: COMMERCIAL

## 2021-09-27 DIAGNOSIS — G89.29 CHRONIC PAIN OF LEFT THUMB: ICD-10-CM

## 2021-09-27 DIAGNOSIS — M79.645 CHRONIC PAIN OF LEFT THUMB: ICD-10-CM

## 2021-09-27 PROCEDURE — 97110 THERAPEUTIC EXERCISES: CPT | Mod: GO | Performed by: OCCUPATIONAL THERAPIST

## 2021-09-27 PROCEDURE — 97140 MANUAL THERAPY 1/> REGIONS: CPT | Mod: GO | Performed by: OCCUPATIONAL THERAPIST

## 2021-09-27 PROCEDURE — 97018 PARAFFIN BATH THERAPY: CPT | Mod: GO | Performed by: OCCUPATIONAL THERAPIST

## 2021-09-30 NOTE — PROGRESS NOTES
Please refer to the daily flowsheet for treatment today, total treatment time and time spent performing 1:1 timed codes.       Home Program:  L HBTSS  Recommend comfort cool, size L Small +  Warmth - Epsom Salt Soaks  Adductor release with clip  STM to thenars with ball  Self joint mobs on chest  1st DI strengthening   Isometric C  JPE/AE education    Next Visit:  PN due - discharge to HEP if doing well  Heat - paraffin   STM  Progress thumb stability -  place and hold pinch

## 2021-10-01 ENCOUNTER — LAB (OUTPATIENT)
Dept: LAB | Facility: CLINIC | Age: 51
End: 2021-10-01
Payer: COMMERCIAL

## 2021-10-01 DIAGNOSIS — Z13.220 SCREENING FOR HYPERLIPIDEMIA: ICD-10-CM

## 2021-10-01 DIAGNOSIS — I34.1 MITRAL VALVE PROLAPSE: ICD-10-CM

## 2021-10-01 LAB
ALBUMIN SERPL-MCNC: 3.9 G/DL (ref 3.4–5)
ALP SERPL-CCNC: 45 U/L (ref 40–150)
ALT SERPL W P-5'-P-CCNC: 23 U/L (ref 0–50)
ANION GAP SERPL CALCULATED.3IONS-SCNC: 4 MMOL/L (ref 3–14)
AST SERPL W P-5'-P-CCNC: 17 U/L (ref 0–45)
BASOPHILS # BLD AUTO: 0.1 10E3/UL (ref 0–0.2)
BASOPHILS NFR BLD AUTO: 1 %
BILIRUB SERPL-MCNC: 0.8 MG/DL (ref 0.2–1.3)
BUN SERPL-MCNC: 14 MG/DL (ref 7–30)
CALCIUM SERPL-MCNC: 9.1 MG/DL (ref 8.5–10.1)
CHLORIDE BLD-SCNC: 105 MMOL/L (ref 94–109)
CHOLEST SERPL-MCNC: 156 MG/DL
CO2 SERPL-SCNC: 29 MMOL/L (ref 20–32)
CREAT SERPL-MCNC: 0.86 MG/DL (ref 0.52–1.04)
EOSINOPHIL # BLD AUTO: 0.2 10E3/UL (ref 0–0.7)
EOSINOPHIL NFR BLD AUTO: 5 %
ERYTHROCYTE [DISTWIDTH] IN BLOOD BY AUTOMATED COUNT: 11.7 % (ref 10–15)
FASTING STATUS PATIENT QL REPORTED: YES
GFR SERPL CREATININE-BSD FRML MDRD: 78 ML/MIN/1.73M2
GLUCOSE BLD-MCNC: 75 MG/DL (ref 70–99)
HCT VFR BLD AUTO: 38.9 % (ref 35–47)
HDLC SERPL-MCNC: 65 MG/DL
HGB BLD-MCNC: 13.6 G/DL (ref 11.7–15.7)
IMM GRANULOCYTES # BLD: 0 10E3/UL
IMM GRANULOCYTES NFR BLD: 0 %
LDLC SERPL CALC-MCNC: 81 MG/DL
LYMPHOCYTES # BLD AUTO: 1.2 10E3/UL (ref 0.8–5.3)
LYMPHOCYTES NFR BLD AUTO: 31 %
MCH RBC QN AUTO: 31.7 PG (ref 26.5–33)
MCHC RBC AUTO-ENTMCNC: 35 G/DL (ref 31.5–36.5)
MCV RBC AUTO: 91 FL (ref 78–100)
MONOCYTES # BLD AUTO: 0.4 10E3/UL (ref 0–1.3)
MONOCYTES NFR BLD AUTO: 10 %
NEUTROPHILS # BLD AUTO: 2 10E3/UL (ref 1.6–8.3)
NEUTROPHILS NFR BLD AUTO: 53 %
NONHDLC SERPL-MCNC: 91 MG/DL
NRBC # BLD AUTO: 0 10E3/UL
NRBC BLD AUTO-RTO: 0 /100
PLATELET # BLD AUTO: 200 10E3/UL (ref 150–450)
POTASSIUM BLD-SCNC: 4.1 MMOL/L (ref 3.4–5.3)
PROT SERPL-MCNC: 7.2 G/DL (ref 6.8–8.8)
RBC # BLD AUTO: 4.29 10E6/UL (ref 3.8–5.2)
SODIUM SERPL-SCNC: 138 MMOL/L (ref 133–144)
TRIGL SERPL-MCNC: 49 MG/DL
TSH SERPL DL<=0.005 MIU/L-ACNC: 2.48 MU/L (ref 0.4–4)
WBC # BLD AUTO: 3.8 10E3/UL (ref 4–11)

## 2021-10-01 PROCEDURE — 80061 LIPID PANEL: CPT | Performed by: INTERNAL MEDICINE

## 2021-10-01 PROCEDURE — 80050 GENERAL HEALTH PANEL: CPT | Performed by: INTERNAL MEDICINE

## 2021-10-01 PROCEDURE — 36415 COLL VENOUS BLD VENIPUNCTURE: CPT | Performed by: INTERNAL MEDICINE

## 2021-10-04 ENCOUNTER — HOSPITAL ENCOUNTER (OUTPATIENT)
Dept: CARDIOLOGY | Facility: CLINIC | Age: 51
Discharge: HOME | End: 2021-10-04
Attending: INTERNAL MEDICINE | Admitting: INTERNAL MEDICINE
Payer: COMMERCIAL

## 2021-10-04 ENCOUNTER — THERAPY VISIT (OUTPATIENT)
Dept: OCCUPATIONAL THERAPY | Facility: CLINIC | Age: 51
End: 2021-10-04
Payer: COMMERCIAL

## 2021-10-04 DIAGNOSIS — I34.1 MITRAL VALVE PROLAPSE: ICD-10-CM

## 2021-10-04 DIAGNOSIS — M79.645 CHRONIC PAIN OF LEFT THUMB: ICD-10-CM

## 2021-10-04 DIAGNOSIS — I34.0 MODERATE MITRAL REGURGITATION: ICD-10-CM

## 2021-10-04 DIAGNOSIS — G89.29 CHRONIC PAIN OF LEFT THUMB: ICD-10-CM

## 2021-10-04 LAB — LVEF ECHO: NORMAL

## 2021-10-04 PROCEDURE — 97018 PARAFFIN BATH THERAPY: CPT | Mod: GO | Performed by: OCCUPATIONAL THERAPIST

## 2021-10-04 PROCEDURE — 97110 THERAPEUTIC EXERCISES: CPT | Mod: GO | Performed by: OCCUPATIONAL THERAPIST

## 2021-10-04 PROCEDURE — 97140 MANUAL THERAPY 1/> REGIONS: CPT | Mod: GO | Performed by: OCCUPATIONAL THERAPIST

## 2021-10-04 PROCEDURE — 93306 TTE W/DOPPLER COMPLETE: CPT | Mod: 26 | Performed by: INTERNAL MEDICINE

## 2021-10-04 PROCEDURE — 93306 TTE W/DOPPLER COMPLETE: CPT

## 2021-10-13 ENCOUNTER — MYC MEDICAL ADVICE (OUTPATIENT)
Dept: FAMILY MEDICINE | Facility: CLINIC | Age: 51
End: 2021-10-13

## 2021-10-13 DIAGNOSIS — F43.21 ADJUSTMENT DISORDER WITH DEPRESSED MOOD: ICD-10-CM

## 2021-10-14 ENCOUNTER — TELEPHONE (OUTPATIENT)
Dept: FAMILY MEDICINE | Facility: CLINIC | Age: 51
End: 2021-10-14
Payer: COMMERCIAL

## 2021-10-14 RX ORDER — ESCITALOPRAM OXALATE 10 MG/1
10 TABLET ORAL DAILY
Qty: 30 TABLET | Refills: 0 | Status: SHIPPED | OUTPATIENT
Start: 2021-10-14 | End: 2021-10-18

## 2021-10-18 ENCOUNTER — VIRTUAL VISIT (OUTPATIENT)
Dept: FAMILY MEDICINE | Facility: CLINIC | Age: 51
End: 2021-10-18
Payer: COMMERCIAL

## 2021-10-18 DIAGNOSIS — F43.21 ADJUSTMENT DISORDER WITH DEPRESSED MOOD: ICD-10-CM

## 2021-10-18 PROCEDURE — 99213 OFFICE O/P EST LOW 20 MIN: CPT | Mod: 95 | Performed by: FAMILY MEDICINE

## 2021-10-18 RX ORDER — ESCITALOPRAM OXALATE 10 MG/1
10 TABLET ORAL DAILY
Qty: 90 TABLET | Refills: 3 | Status: SHIPPED | OUTPATIENT
Start: 2021-10-18 | End: 2022-06-23

## 2021-10-18 ASSESSMENT — ANXIETY QUESTIONNAIRES
6. BECOMING EASILY ANNOYED OR IRRITABLE: NOT AT ALL
7. FEELING AFRAID AS IF SOMETHING AWFUL MIGHT HAPPEN: NOT AT ALL
5. BEING SO RESTLESS THAT IT IS HARD TO SIT STILL: NOT AT ALL
1. FEELING NERVOUS, ANXIOUS, OR ON EDGE: NOT AT ALL
3. WORRYING TOO MUCH ABOUT DIFFERENT THINGS: NOT AT ALL
2. NOT BEING ABLE TO STOP OR CONTROL WORRYING: NOT AT ALL
IF YOU CHECKED OFF ANY PROBLEMS ON THIS QUESTIONNAIRE, HOW DIFFICULT HAVE THESE PROBLEMS MADE IT FOR YOU TO DO YOUR WORK, TAKE CARE OF THINGS AT HOME, OR GET ALONG WITH OTHER PEOPLE: NOT DIFFICULT AT ALL
GAD7 TOTAL SCORE: 0

## 2021-10-18 ASSESSMENT — PATIENT HEALTH QUESTIONNAIRE - PHQ9
SUM OF ALL RESPONSES TO PHQ QUESTIONS 1-9: 0
5. POOR APPETITE OR OVEREATING: NOT AT ALL

## 2021-10-18 NOTE — PROGRESS NOTES
MARKO is a 50 year old who is being evaluated via a billable telephone visit.      What phone number would you like to be contacted at? [phone]  How would you like to obtain your AVS? MyChart    Assessment & Plan     Adjustment disorder with depressed mood  Doing well.   - escitalopram (LEXAPRO) 10 MG tablet; Take 1 tablet (10 mg) by mouth daily    Return in about 6 months (around 2022) for Follow-up for Mental Health.    Shayan Warren MD  [facility]    Rafa ZHU is a 50 year old who presents for the following health issues     HPI     Medication Followup of escitalopram 10 mg    Taking Medication as prescribed: yes    Side Effects:  None    Medication Helping Symptoms:  yes      Depression and Anxiety Follow-Up    How are you doing with your depression since your last visit? No change    How are you doing with your anxiety since your last visit?  No change    Are you having other symptoms that might be associated with depression or anxiety? No    Have you had a significant life event? No     Do you have any concerns with your use of alcohol or other drugs? No    Having occasional restless legs.    Social History     Tobacco Use     Smoking status: Former Smoker     Packs/day: 1.00     Years: 3.00     Pack years: 3.00     Types: Cigarettes     Start date:      Quit date:      Years since quittin.8     Smokeless tobacco: Never Used   Vaping Use     Vaping Use: Never used   Substance Use Topics     Alcohol use: Yes     Comment: Socially     Drug use: No     PHQ 2021 2021 10/18/2021   PHQ-9 Total Score 12 2 0   Q9: Thoughts of better off dead/self-harm past 2 weeks Not at all Not at all Not at all     JOSE-7 SCORE 2021 2021 10/18/2021   Total Score 6 (mild anxiety) 2 (minimal anxiety) -   Total Score 6 2 0     Last PHQ-9 10/18/2021   1.  Little interest or pleasure in doing things 0   2.  Feeling down, depressed, or hopeless 0   3.  Trouble  falling or staying asleep, or sleeping too much 0   4.  Feeling tired or having little energy 0   5.  Poor appetite or overeating 0   6.  Feeling bad about yourself 0   7.  Trouble concentrating 0   8.  Moving slowly or restless 0   Q9: Thoughts of better off dead/self-harm past 2 weeks 0   PHQ-9 Total Score 0   Difficulty at work, home, or with people Not difficult at all     JOSE-7  10/18/2021   1. Feeling nervous, anxious, or on edge 0   2. Not being able to stop or control worrying 0   3. Worrying too much about different things 0   4. Trouble relaxing 0   5. Being so restless that it is hard to sit still 0   6. Becoming easily annoyed or irritable 0   7. Feeling afraid, as if something awful might happen 0   JOSE-7 Total Score 0   If you checked any problems, how difficult have they made it for you to do your work, take care of things at home, or get along with other people? Not difficult at all       Suicide Assessment Five-step Evaluation and Treatment (SAFE-T)      How many servings of fruits and vegetables do you eat daily?  2-3    On average, how many sweetened beverages do you drink each day (Examples: soda, juice, sweet tea, etc.  Do NOT count diet or artificially sweetened beverages)?   0    How many days per week do you exercise enough to make your heart beat faster? 7    How many minutes a day do you exercise enough to make your heart beat faster? 30 - 60    How many days per week do you miss taking your medication? 0      Review of Systems   Constitutional, HEENT, cardiovascular, pulmonary, gi and gu systems are negative, except as otherwise noted.      Objective           Vitals:  No vitals were obtained today due to virtual visit.    Physical Exam   healthy, alert and no distress  PSYCH: Alert and oriented times 3; coherent speech, normal   rate and volume, able to articulate logical thoughts, able   to abstract reason, no tangential thoughts, no hallucinations   or delusions  Her affect is  normal  RESP: No cough, no audible wheezing, able to talk in full sentences  Remainder of exam unable to be completed due to telephone visits    No results found for any visits on 10/18/21.        Phone call duration: 10 minutes

## 2021-10-19 ASSESSMENT — ANXIETY QUESTIONNAIRES: GAD7 TOTAL SCORE: 0

## 2021-10-26 ENCOUNTER — LAB (OUTPATIENT)
Dept: LAB | Facility: CLINIC | Age: 51
End: 2021-10-26
Payer: COMMERCIAL

## 2021-10-26 DIAGNOSIS — D72.819 LEUKOPENIA, UNSPECIFIED TYPE: ICD-10-CM

## 2021-10-26 LAB
BASOPHILS # BLD AUTO: 0 10E3/UL (ref 0–0.2)
BASOPHILS NFR BLD AUTO: 0 %
EOSINOPHIL # BLD AUTO: 0.2 10E3/UL (ref 0–0.7)
EOSINOPHIL NFR BLD AUTO: 3 %
ERYTHROCYTE [DISTWIDTH] IN BLOOD BY AUTOMATED COUNT: 12.1 % (ref 10–15)
HCT VFR BLD AUTO: 40.3 % (ref 35–47)
HGB BLD-MCNC: 13.5 G/DL (ref 11.7–15.7)
LYMPHOCYTES # BLD AUTO: 1.5 10E3/UL (ref 0.8–5.3)
LYMPHOCYTES NFR BLD AUTO: 25 %
MCH RBC QN AUTO: 31.8 PG (ref 26.5–33)
MCHC RBC AUTO-ENTMCNC: 33.5 G/DL (ref 31.5–36.5)
MCV RBC AUTO: 95 FL (ref 78–100)
MONOCYTES # BLD AUTO: 0.5 10E3/UL (ref 0–1.3)
MONOCYTES NFR BLD AUTO: 8 %
NEUTROPHILS # BLD AUTO: 3.8 10E3/UL (ref 1.6–8.3)
NEUTROPHILS NFR BLD AUTO: 64 %
PLATELET # BLD AUTO: 219 10E3/UL (ref 150–450)
RBC # BLD AUTO: 4.25 10E6/UL (ref 3.8–5.2)
RETICS # AUTO: 0.08 10E6/UL (ref 0.03–0.1)
RETICS/RBC NFR AUTO: 1.9 % (ref 0.5–2)
WBC # BLD AUTO: 5.9 10E3/UL (ref 4–11)

## 2021-10-26 PROCEDURE — 85025 COMPLETE CBC W/AUTO DIFF WBC: CPT

## 2021-10-26 PROCEDURE — 85045 AUTOMATED RETICULOCYTE COUNT: CPT

## 2021-10-26 PROCEDURE — 85060 BLOOD SMEAR INTERPRETATION: CPT | Performed by: PATHOLOGY

## 2021-10-26 PROCEDURE — 36415 COLL VENOUS BLD VENIPUNCTURE: CPT

## 2021-10-27 LAB
PATH REPORT.COMMENTS IMP SPEC: NORMAL
PATH REPORT.FINAL DX SPEC: NORMAL
PATH REPORT.MICROSCOPIC SPEC OTHER STN: NORMAL
PATH REPORT.MICROSCOPIC SPEC OTHER STN: NORMAL

## 2021-10-27 NOTE — RESULT ENCOUNTER NOTE
Dear MARKO,   Here are your recent results. All results are within the expected range.     Best Regards   Shayan Warren MD

## 2021-11-21 PROBLEM — G89.29 CHRONIC PAIN OF LEFT THUMB: Status: RESOLVED | Noted: 2021-09-20 | Resolved: 2021-11-21

## 2021-11-21 PROBLEM — M79.645 CHRONIC PAIN OF LEFT THUMB: Status: RESOLVED | Noted: 2021-09-20 | Resolved: 2021-11-21

## 2021-11-21 NOTE — PROGRESS NOTES
DISCHARGE REPORT    Letitia did not return for further treatment after the last visit on 10/4/21.   Current status is unknown.  Please see information below for last known relevant information.  Patient seen for 3 visits.    SUBJECTIVE  Subjective changes noted by patient:  The thumb was more achy again this week. I've been having some aching in other joints as well, I want to get things looked into.  .  Changes in function: (See Goal flowsheet attached for changes in current functional level)  Adverse reaction to treatment or activity: None    OBJECTIVE  Changes noted in objective findings:   ongoing thenar mm tension, released after STM today    ASSESSMENT/PLAN  Diagnosis: L Thumb Pain   Updated problem list and treatment plan:  Patient will continue to utilize HEP for any remaining deficits.   STG/LTGs have been met or progress has been made towards goals:  Please see goal flowsheet for most current information  Assessment of Progress: current status is unknown.    Last current status:     Self Management Plans:  HEP  I have re-evaluated this patient and find that the nature, scope, duration and intensity of the therapy is appropriate for the medical condition of the patient.  Letitia continues to require the following intervention to meet STG and LTG's:  HEP.    Recommendations:  Discharge with current home program.  Patient to follow up with MD as needed.    Please refer to the daily flowsheet for treatment today, total treatment time and time spent performing 1:1 timed codes.

## 2022-01-07 ENCOUNTER — OFFICE VISIT (OUTPATIENT)
Dept: CARDIOLOGY | Facility: CLINIC | Age: 52
End: 2022-01-07
Payer: COMMERCIAL

## 2022-01-07 VITALS
BODY MASS INDEX: 24.03 KG/M2 | WEIGHT: 140 LBS | SYSTOLIC BLOOD PRESSURE: 120 MMHG | HEART RATE: 53 BPM | DIASTOLIC BLOOD PRESSURE: 67 MMHG | OXYGEN SATURATION: 100 %

## 2022-01-07 DIAGNOSIS — I34.0 MODERATE MITRAL REGURGITATION: Primary | ICD-10-CM

## 2022-01-07 DIAGNOSIS — I34.1 MITRAL VALVE PROLAPSE: ICD-10-CM

## 2022-01-07 PROCEDURE — 93000 ELECTROCARDIOGRAM COMPLETE: CPT | Performed by: INTERNAL MEDICINE

## 2022-01-07 PROCEDURE — 99214 OFFICE O/P EST MOD 30 MIN: CPT | Performed by: INTERNAL MEDICINE

## 2022-01-07 NOTE — PROGRESS NOTES
Clinic visit note dictated. Dictation reference number - [id number]        Today's clinic visit entailed:  The following tests were independently interpreted by me as noted in my documentation: ECG, labs, transthoracic echocardiogram  Ordering of each unique test  30 minutes spent on the date of the encounter doing chart review, history and exam, documentation and further activities per the note  Provider  Link to [facility] Help Grid     The level of medical decision making during this visit was of moderate complexity.        CURRENT MEDICATIONS:  Current Outpatient Medications   Medication Sig Dispense Refill     acyclovir (ZOVIRAX) 400 MG tablet TK 1 T PO  BID. TK 1 T PO BID FOR 5 DAYS FOR EACH OUTBREAK  1     albuterol (PROAIR HFA/PROVENTIL HFA/VENTOLIN HFA) 108 (90 Base) MCG/ACT inhaler Inhale 2 puffs into the lungs every 4 hours as needed for shortness of breath / dyspnea or wheezing 1 Inhaler 5     cholecalciferol (VITAMIN D3) 125 mcg (5000 units) capsule Take by mouth daily       cyanocobalamin (VITAMIN B-12) 1000 MCG tablet Take by mouth daily       escitalopram (LEXAPRO) 10 MG tablet Take 1 tablet (10 mg) by mouth daily 90 tablet 3     levonorgestrel (MIRENA) 20 MCG/24HR IUD 1 each by Intrauterine route once       LORazepam (ATIVAN) 0.5 MG tablet Take 1 tablet (0.5 mg) by mouth nightly as needed for agitation, anxiety or sleep 30 tablet 1     metroNIDAZOLE (METROGEL) 0.75 % external gel Apply to face BID 45 g 11         ALLERGIES:  Allergies   Allergen Reactions     Diphenhydramine Other (See Comments)     PN: Numbness and tingling.- 04/16/21: States she can take this     Seasonal Allergies      Adhesive Tape Rash       PAST MEDICAL HISTORY:    Past Medical History:   Diagnosis Date     [facility]    [facility]     Chronic fatigue      Former smoker      Hordeolum externum of right lower eyelid 6/7/2018     Migraine with aura and without status migrainosus, not intractable 3/7/2003    ibu sometimes help  and sometimes it does not     Mitral regurgitation      Mitral valve prolapse      PAC (premature atrial contraction)      PVC's (premature ventricular contractions)      Sinus bradycardia      Uncomplicated asthma 1974    Have had little problem until this year, I think.       PAST SURGICAL HISTORY:    Past Surgical History:   Procedure Laterality Date     BIOPSY  2015    mole, benign     COLONOSCOPY N/A 2020    Procedure: COLONOSCOPY;  Surgeon: Dairo Vieyra MD;  Location:  GI     GYN SURGERY      laproscopic cutting endometriosis       FAMILY HISTORY:    Family History   Problem Relation Age of Onset     Arthritis Mother      Depression Mother      Genitourinary Problems Mother      Cerebrovascular Disease Mother         at 92     Heart Defect Father      Hypertension Father      Hyperlipidemia Father      Alcoholism Sister      Depression Sister      Mental Illness Sister      Anxiety Disorder Sister      Alcoholism Brother      Depression Brother      Other Cancer Brother         caused by HPV     Asthma Maternal Grandfather      Skin Cancer Brother      Mental Illness Sister      Substance Abuse Sister        SOCIAL HISTORY:    Social History     Socioeconomic History     Marital status:      Spouse name: None     Number of children: None     Years of education: None     Highest education level: None   Occupational History     None   Tobacco Use     Smoking status: Former Smoker     Packs/day: 1.00     Years: 3.00     Pack years: 3.00     Types: Cigarettes     Start date:      Quit date:      Years since quittin.0     Smokeless tobacco: Never Used   Vaping Use     Vaping Use: Never used   Substance and Sexual Activity     Alcohol use: Yes     Comment: Socially     Drug use: No     Sexual activity: Yes     Partners: Male     Birth control/protection: I.U.D.     Comment: Connie   Other Topics Concern     Parent/sibling w/ CABG, MI or angioplasty before 65F 55M? No    Social History Narrative     None     Social Determinants of Health     Financial Resource Strain: Not on file   Food Insecurity: Not on file   Transportation Needs: Not on file   Physical Activity: Not on file   Stress: Not on file   Social Connections: Not on file   Intimate Partner Violence: Not on file   Housing Stability: Not on file       PHYSICAL EXAM:    Vitals: /67   Pulse 53   Wt 63.5 kg (140 lb)   SpO2 100%   BMI 24.03 kg/m    Wt Readings from Last 5 Encounters:   01/07/22 63.5 kg (140 lb)   06/09/21 62.1 kg (137 lb)   12/03/20 62.4 kg (137 lb 8 oz)   09/16/20 63.5 kg (140 lb)   08/29/20 61.7 kg (136 lb)           Encounter Diagnoses   Name Primary?     Moderate mitral regurgitation Yes     Mitral valve prolapse        Orders Placed This Encounter   Procedures     Follow-Up with Cardiologist     EKG 12-lead complete w/read - Clinics (performed today)     Echocardiogram Complete

## 2022-01-07 NOTE — PROGRESS NOTES
Service Date: 01/07/2022    PRIMARY CARE PROVIDER:  Shayan aWrren MD      REASON FOR VISIT:  Scheduled followup of mitral valve regurgitation secondary to myxomatous mitral valve disease.    HISTORY OF PRESENT ILLNESS:    It was my pleasure to follow up with Letitia, whom I see on a yearly basis, for myxomatous mitral valve disease and mild to moderate mitral regurgitation.  She is a delightful, younger-appearing, 51-year-old  lady, slim, regular exerciser with physiological sinus bradycardia, , has 2 young children (11-year-old son and 20-year-old daughter).    Letitia's history is significant for:  1.  Mitral valve prolapse diagnosed as a child.  2.  Mild to moderate mitral valve regurgitation that has been challenging to quantify on imaging because of jet eccentricity.  She has never been symptomatic.    I am pleased to hear that Letitia remains asymptomatic.  She is an avid exerciser and runs with her dog every day and enjoys this.  She has never had atrial fibrillation.  She has an IUD, so is not sure about menopause, but thinks overall she may be going through it.    PHYSICAL EXAMINATION:    BP is excellent at 120/67.  Pulse is 53 BPM. On exam, she has a well-audible midsystolic click of prolapse, regular heart sounds and a late systolic murmur of mitral regurgitation.     DIAGNOSTIC DATA:    Laboratory:  Excellent lipid panel with a total cholesterol of 156, HDL 65, LDL 81, triglycerides 49, creatinine 0.8, normal CBC.    ECG done today shows sinus bradycardia at 53 BPM with normal cardiac intervals and a QTc of 412 msec.    I personally reviewed her recent echocardiogram images.  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  Mildly dilated left atrium (diameter 4.1 cm), normal LV size (end-systolic diameter 2.8 cm) with a LVEF of 65%-70%.  Normal RV size and systolic function.  RVSP  could not be estimated.  Normal IVC.    DIAGNOSIS:    1.  Mild to moderate mitral valve regurgitation secondary to bileaflet mitral valve prolapse, asymptomatic, mildly dilated left atrium.    ASSESSMENT:    The patient's lack of symptoms, physical exam and echocardiogram are consistent with stable, asymptomatic, mild to moderate MR.    PLAN:    1.  Continue yearly surveillance with echocardiogram and clinic visit.  She does not need routine labs.  2.  She is well aware of the importance of dental hygiene with regard to valve disease.    Kelli Solis MD        D: 2022   T: 2022   MT: seven    Name:     MARKO SALDAÑAHira  MRN:      [id number]        Account:      [id number]   :      1970           Service Date: 2022       Document: [id number]

## 2022-01-07 NOTE — LETTER
1/7/2022    Shayan Warren MD  [address] [facility] [address]  [facility] [address]    RE: Letitia RANDLE Joselito       Dear Colleague,     I had the pleasure of seeing Letitia RANDLE Joselito in the [facility].  Clinic visit note dictated. Dictation reference number - [id number]        Today's clinic visit entailed:  The following tests were independently interpreted by me as noted in my documentation: ECG, labs, transthoracic echocardiogram  Ordering of each unique test  30 minutes spent on the date of the encounter doing chart review, history and exam, documentation and further activities per the note  Provider  Link to MDM Help Grid     The level of medical decision making during this visit was of moderate complexity.        CURRENT MEDICATIONS:  Current Outpatient Medications   Medication Sig Dispense Refill     acyclovir (ZOVIRAX) 400 MG tablet TK 1 T PO  BID. TK 1 T PO BID FOR 5 DAYS FOR EACH OUTBREAK  1     albuterol (PROAIR HFA/PROVENTIL HFA/VENTOLIN HFA) 108 (90 Base) MCG/ACT inhaler Inhale 2 puffs into the lungs every 4 hours as needed for shortness of breath / dyspnea or wheezing 1 Inhaler 5     cholecalciferol (VITAMIN D3) 125 mcg (5000 units) capsule Take by mouth daily       cyanocobalamin (VITAMIN B-12) 1000 MCG tablet Take by mouth daily       escitalopram (LEXAPRO) 10 MG tablet Take 1 tablet (10 mg) by mouth daily 90 tablet 3     levonorgestrel (MIRENA) 20 MCG/24HR IUD 1 each by Intrauterine route once       LORazepam (ATIVAN) 0.5 MG tablet Take 1 tablet (0.5 mg) by mouth nightly as needed for agitation, anxiety or sleep 30 tablet 1     metroNIDAZOLE (METROGEL) 0.75 % external gel Apply to face BID 45 g 11         ALLERGIES:  Allergies   Allergen Reactions     Diphenhydramine Other (See Comments)     PN: Numbness and tingling.- 04/16/21: States she can take this     Seasonal Allergies      Adhesive Tape Rash       PAST MEDICAL HISTORY:    Past Medical History:   Diagnosis Date      Arthritis 2018    in my knuckle     Chronic fatigue      Former smoker      Hordeolum externum of right lower eyelid 2018     Migraine with aura and without status migrainosus, not intractable 3/7/2003    ibu sometimes help and sometimes it does not     Mitral regurgitation      Mitral valve prolapse      PAC (premature atrial contraction)      PVC's (premature ventricular contractions)      Sinus bradycardia      Uncomplicated asthma 1974    Have had little problem until this year, I think.       PAST SURGICAL HISTORY:    Past Surgical History:   Procedure Laterality Date     BIOPSY  2015    mole, benign     COLONOSCOPY N/A 2020    Procedure: COLONOSCOPY;  Surgeon: Dario Vieyra MD;  Location:  GI     GYN SURGERY      laproscopic cutting endometriosis       FAMILY HISTORY:    Family History   Problem Relation Age of Onset     Arthritis Mother      Depression Mother      Genitourinary Problems Mother      Cerebrovascular Disease Mother         at 92     Heart Defect Father      Hypertension Father      Hyperlipidemia Father      Alcoholism Sister      Depression Sister      Mental Illness Sister      Anxiety Disorder Sister      Alcoholism Brother      Depression Brother      Other Cancer Brother         caused by HPV     Asthma Maternal Grandfather      Skin Cancer Brother      Mental Illness Sister      Substance Abuse Sister        SOCIAL HISTORY:    Social History     Socioeconomic History     Marital status:      Spouse name: None     Number of children: None     Years of education: None     Highest education level: None   Occupational History     None   Tobacco Use     Smoking status: Former Smoker     Packs/day: 1.00     Years: 3.00     Pack years: 3.00     Types: Cigarettes     Start date:      Quit date:      Years since quittin.0     Smokeless tobacco: Never Used   Vaping Use     Vaping Use: Never used   Substance and Sexual Activity     Alcohol use: Yes      Comment: Socially     Drug use: No     Sexual activity: Yes     Partners: Male     Birth control/protection: I.U.D.     Comment: Connie   Other Topics Concern     Parent/sibling w/ CABG, MI or angioplasty before 65F 55M? No   Social History Narrative     None     Social Determinants of Health     Financial Resource Strain: Not on file   Food Insecurity: Not on file   Transportation Needs: Not on file   Physical Activity: Not on file   Stress: Not on file   Social Connections: Not on file   Intimate Partner Violence: Not on file   Housing Stability: Not on file       PHYSICAL EXAM:    Vitals: /67   Pulse 53   Wt 63.5 kg (140 lb)   SpO2 100%   BMI 24.03 kg/m    Wt Readings from Last 5 Encounters:   01/07/22 63.5 kg (140 lb)   06/09/21 62.1 kg (137 lb)   12/03/20 62.4 kg (137 lb 8 oz)   09/16/20 63.5 kg (140 lb)   08/29/20 61.7 kg (136 lb)           Encounter Diagnoses   Name Primary?     Moderate mitral regurgitation Yes     Mitral valve prolapse        Orders Placed This Encounter   Procedures     Follow-Up with Cardiologist     EKG 12-lead complete w/read - Clinics (performed today)     Echocardiogram Complete             Thank you for allowing me to participate in the care of your patient.      Sincerely,     Kelli Solis MD     [facility]  cc:   No referring provider defined for this encounter.

## 2022-01-07 NOTE — LETTER
1/7/2022       RE: Letitia Yee  [address] [facility] [address]     Dear Colleague,    Thank you for referring your patient, Letitia Yee, to the [facility] at [facility]. Please see a copy of my visit note below.    Clinic visit note dictated. Dictation reference number - [id number]    Today's clinic visit entailed:  The following tests were independently interpreted by me as noted in my documentation: ECG, labs, transthoracic echocardiogram  Ordering of each unique test  30 minutes spent on the date of the encounter doing chart review, history and exam, documentation and further activities per the note  Provider  Link to MDM Help Grid     The level of medical decision making during this visit was of moderate complexity.    CURRENT MEDICATIONS:  Current Outpatient Medications   Medication Sig Dispense Refill     acyclovir (ZOVIRAX) 400 MG tablet TK 1 T PO  BID. TK 1 T PO BID FOR 5 DAYS FOR EACH OUTBREAK  1     albuterol (PROAIR HFA/PROVENTIL HFA/VENTOLIN HFA) 108 (90 Base) MCG/ACT inhaler Inhale 2 puffs into the lungs every 4 hours as needed for shortness of breath / dyspnea or wheezing 1 Inhaler 5     cholecalciferol (VITAMIN D3) 125 mcg (5000 units) capsule Take by mouth daily       cyanocobalamin (VITAMIN B-12) 1000 MCG tablet Take by mouth daily       escitalopram (LEXAPRO) 10 MG tablet Take 1 tablet (10 mg) by mouth daily 90 tablet 3     levonorgestrel (MIRENA) 20 MCG/24HR IUD 1 each by Intrauterine route once       LORazepam (ATIVAN) 0.5 MG tablet Take 1 tablet (0.5 mg) by mouth nightly as needed for agitation, anxiety or sleep 30 tablet 1     metroNIDAZOLE (METROGEL) 0.75 % external gel Apply to face BID 45 g 11       ALLERGIES:  Allergies   Allergen Reactions     Diphenhydramine Other (See Comments)     PN: Numbness and tingling.- 04/16/21: States she can take this     Seasonal Allergies      Adhesive Tape  Rash       PAST MEDICAL HISTORY:    Past Medical History:   Diagnosis Date     Arthritis 2018    in my kncarlyle     Chronic fatigue      Former smoker      Hordeolum externum of right lower eyelid 2018     Migraine with aura and without status migrainosus, not intractable 3/7/2003    ibu sometimes help and sometimes it does not     Mitral regurgitation      Mitral valve prolapse      PAC (premature atrial contraction)      PVC's (premature ventricular contractions)      Sinus bradycardia      Uncomplicated asthma 1974    Have had little problem until this year, I think.       PAST SURGICAL HISTORY:    Past Surgical History:   Procedure Laterality Date     BIOPSY  2015    mole, benign     COLONOSCOPY N/A 2020    Procedure: COLONOSCOPY;  Surgeon: Dario Vieyra MD;  Location:  GI     GYN SURGERY      laproscopic cutting endometriosis       FAMILY HISTORY:    Family History   Problem Relation Age of Onset     Arthritis Mother      Depression Mother      Genitourinary Problems Mother      Cerebrovascular Disease Mother         at 92     Heart Defect Father      Hypertension Father      Hyperlipidemia Father      Alcoholism Sister      Depression Sister      Mental Illness Sister      Anxiety Disorder Sister      Alcoholism Brother      Depression Brother      Other Cancer Brother         caused by HPV     Asthma Maternal Grandfather      Skin Cancer Brother      Mental Illness Sister      Substance Abuse Sister        SOCIAL HISTORY:    Social History     Socioeconomic History     Marital status:      Spouse name: None     Number of children: None     Years of education: None     Highest education level: None   Occupational History     None   Tobacco Use     Smoking status: Former Smoker     Packs/day: 1.00     Years: 3.00     Pack years: 3.00     Types: Cigarettes     Start date:      Quit date:      Years since quittin.0     Smokeless tobacco: Never Used   Vaping Use      Vaping Use: Never used   Substance and Sexual Activity     Alcohol use: Yes     Comment: Socially     Drug use: No     Sexual activity: Yes     Partners: Male     Birth control/protection: I.U.D.     Comment: Connie   Other Topics Concern     Parent/sibling w/ CABG, MI or angioplasty before 65F 55M? No   Social History Narrative     None     Social Determinants of Health     Financial Resource Strain: Not on file   Food Insecurity: Not on file   Transportation Needs: Not on file   Physical Activity: Not on file   Stress: Not on file   Social Connections: Not on file   Intimate Partner Violence: Not on file   Housing Stability: Not on file       PHYSICAL EXAM:    Vitals: /67   Pulse 53   Wt 63.5 kg (140 lb)   SpO2 100%   BMI 24.03 kg/m    Wt Readings from Last 5 Encounters:   01/07/22 63.5 kg (140 lb)   06/09/21 62.1 kg (137 lb)   12/03/20 62.4 kg (137 lb 8 oz)   09/16/20 63.5 kg (140 lb)   08/29/20 61.7 kg (136 lb)       Encounter Diagnoses   Name Primary?     Moderate mitral regurgitation Yes     Mitral valve prolapse        Orders Placed This Encounter   Procedures     Follow-Up with Cardiologist     EKG 12-lead complete w/read - Clinics (performed today)     Echocardiogram Complete       Service Date: 01/07/2022    PRIMARY CARE PROVIDER:  Shayan Warren MD      REASON FOR VISIT:  Scheduled followup of mitral valve regurgitation secondary to myxomatous mitral valve disease.    HISTORY OF PRESENT ILLNESS:    It was my pleasure to follow up with Letitia, whom I see on a yearly basis, for myxomatous mitral valve disease and mild to moderate mitral regurgitation.  She is a delightful, younger-appearing, 51-year-old  lady, slim, regular exerciser with physiological sinus bradycardia, , has 2 young children (11-year-old son and 20-year-old daughter).    Letitia's history is significant for:  1.  Mitral valve prolapse diagnosed as a child.  2.  Mild to moderate mitral valve regurgitation  that has been challenging to quantify on imaging because of jet eccentricity.  She has never been symptomatic.    I am pleased to hear that Letitia remains asymptomatic.  She is an avid exerciser and runs with her dog every day and enjoys this.  She has never had atrial fibrillation.  She has an IUD, so is not sure about menopause, but thinks overall she may be going through it.    PHYSICAL EXAMINATION:    BP is excellent at 120/67.  Pulse is 53 BPM. On exam, she has a well-audible midsystolic click of prolapse, regular heart sounds and a late systolic murmur of mitral regurgitation.     DIAGNOSTIC DATA:    Laboratory:  Excellent lipid panel with a total cholesterol of 156, HDL 65, LDL 81, triglycerides 49, creatinine 0.8, normal CBC.    ECG done today shows sinus bradycardia at 53 BPM with normal cardiac intervals and a QTc of 412 msec.    I personally reviewed her recent echocardiogram images.  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  Mildly dilated left atrium (diameter 4.1 cm), normal LV size (end-systolic diameter 2.8 cm) with a LVEF of 65%-70%.  Normal RV size and systolic function.  RVSP could not be estimated.  Normal IVC.    DIAGNOSIS:    1.  Mild to moderate mitral valve regurgitation secondary to bileaflet mitral valve prolapse, asymptomatic, mildly dilated left atrium.    ASSESSMENT:    The patient's lack of symptoms, physical exam and echocardiogram are consistent with stable, asymptomatic, mild to moderate MR.    PLAN:    1.  Continue yearly surveillance with echocardiogram and clinic visit.  She does not need routine labs.  2.  She is well aware of the importance of dental hygiene with regard to valve disease.      Kelli Solis MD        D: 2022   T: 2022   MT: seven    Name:     LETITIA SALDAÑA  MRN:      [id number]        Account:      [id number]   :       1970           Service Date: 01/07/2022     Document: [id number]

## 2022-02-03 ENCOUNTER — OFFICE VISIT (OUTPATIENT)
Dept: FAMILY MEDICINE | Facility: CLINIC | Age: 52
End: 2022-02-03
Payer: COMMERCIAL

## 2022-02-03 VITALS
TEMPERATURE: 98.1 F | SYSTOLIC BLOOD PRESSURE: 120 MMHG | OXYGEN SATURATION: 99 % | RESPIRATION RATE: 16 BRPM | WEIGHT: 136 LBS | DIASTOLIC BLOOD PRESSURE: 82 MMHG | HEART RATE: 55 BPM | HEIGHT: 64 IN | BODY MASS INDEX: 23.22 KG/M2

## 2022-02-03 DIAGNOSIS — N89.8 VAGINAL ODOR: ICD-10-CM

## 2022-02-03 DIAGNOSIS — R35.0 URINARY FREQUENCY: Primary | ICD-10-CM

## 2022-02-03 LAB
ALBUMIN UR-MCNC: NEGATIVE MG/DL
APPEARANCE UR: CLEAR
BACTERIA #/AREA URNS HPF: ABNORMAL /HPF
BILIRUB UR QL STRIP: NEGATIVE
CLUE CELLS: ABNORMAL
COLOR UR AUTO: YELLOW
GLUCOSE UR STRIP-MCNC: NEGATIVE MG/DL
HGB UR QL STRIP: ABNORMAL
KETONES UR STRIP-MCNC: NEGATIVE MG/DL
LEUKOCYTE ESTERASE UR QL STRIP: NEGATIVE
NITRATE UR QL: NEGATIVE
PH UR STRIP: 5 [PH] (ref 5–7)
RBC #/AREA URNS AUTO: ABNORMAL /HPF
SP GR UR STRIP: 1.01 (ref 1–1.03)
SQUAMOUS #/AREA URNS AUTO: ABNORMAL /LPF
TRICHOMONAS, WET PREP: ABNORMAL
UROBILINOGEN UR STRIP-ACNC: 0.2 E.U./DL
WBC #/AREA URNS AUTO: ABNORMAL /HPF
WBC'S/HIGH POWER FIELD, WET PREP: ABNORMAL
YEAST, WET PREP: ABNORMAL

## 2022-02-03 PROCEDURE — 87210 SMEAR WET MOUNT SALINE/INK: CPT | Performed by: INTERNAL MEDICINE

## 2022-02-03 PROCEDURE — 99213 OFFICE O/P EST LOW 20 MIN: CPT | Performed by: INTERNAL MEDICINE

## 2022-02-03 PROCEDURE — 81001 URINALYSIS AUTO W/SCOPE: CPT | Performed by: INTERNAL MEDICINE

## 2022-02-03 PROCEDURE — 87086 URINE CULTURE/COLONY COUNT: CPT | Performed by: INTERNAL MEDICINE

## 2022-02-03 ASSESSMENT — PAIN SCALES - GENERAL: PAINLEVEL: NO PAIN (0)

## 2022-02-03 ASSESSMENT — MIFFLIN-ST. JEOR: SCORE: 1211.89

## 2022-02-03 NOTE — PROGRESS NOTES
"  Assessment & Plan     Urinary frequency  Her symptoms are mild, UA is underwhelming. She would prefer to wait for culture results before deciding about antibiotics, that is very reasonable.   - UA Macro with Reflex to Micro and Culture - lab collect  - Urine Culture Aerobic Bacterial - lab collect; Future  - Urine Culture Aerobic Bacterial - lab collect  - Urine Microscopic    Vaginal odor  Wet prep is negative.   - Wet prep - Clinic Collect            Return in about 3 months (around 5/3/2022) for Physical Exam.    Geovanna Kaplan MD  [facility]          Rafa ZHU is a 51 year old who presents for the following health issues     HPI     Letitia is here with concern for UTI.  About two weeks ago had pretty bothersome UTI symptoms - dysuria, frequency.  They seem to have lessened, but haven't completely gone away.  She is feeling some mild nausea and abdominal discomfort and decreased energy.  Also dealing with some perimenopausal vaginal symptoms and had quite a bit of discharge a week or so ago.      Genitourinary - Female  Onset/Duration: couple weeks ago  Description:   Painful urination (Dysuria): YES           Frequency: YES  Blood in urine (Hematuria): no  Delay in urine (Hesitency): no  Intensity: moderate  Progression of Symptoms:  same  Accompanying Signs & Symptoms:  Fever/chills: no  Flank pain: no  Nausea and vomiting: YES  Vaginal symptoms: odor  Abdominal/Pelvic Pain: YES  History:   History of frequent UTI s: been 11 years since last one  History of kidney stones: no  Sexually Active: no  Possibility of pregnancy: No  Precipitating or alleviating factors: none  Therapies tried and outcome: Increase fluid intake        Review of Systems   Const, gi, gu reviewed,  otherwise negative unless noted above.        Objective    /82   Pulse 55   Temp 98.1  F (36.7  C) (Tympanic)   Resp 16   Ht 1.626 m (5' 4\")   Wt 61.7 kg (136 lb)   SpO2 99%   BMI 23.34 " kg/m    Body mass index is 23.34 kg/m .  Physical Exam   Gen: well appearing, pleasant woman, no distress  Pulm: breathing comfortably, CTAB, no wheezes or rales  CV: RRR, normal S1 and S2, late systolic murmur  Abd: BS present, soft, nontender, nondistended, no CVA tenderness     Results for orders placed or performed in visit on 02/03/22 (from the past 24 hour(s))   UA Macro with Reflex to Micro and Culture - lab collect    Specimen: Urine, Clean Catch   Result Value Ref Range    Color Urine Yellow Colorless, Straw, Light Yellow, Yellow    Appearance Urine Clear Clear    Glucose Urine Negative Negative mg/dL    Bilirubin Urine Negative Negative    Ketones Urine Negative Negative mg/dL    Specific Gravity Urine 1.015 1.003 - 1.035    Blood Urine Small (A) Negative    pH Urine 5.0 5.0 - 7.0    Protein Albumin Urine Negative Negative mg/dL    Urobilinogen Urine 0.2 0.2, 1.0 E.U./dL    Nitrite Urine Negative Negative    Leukocyte Esterase Urine Negative Negative   Wet prep - Clinic Collect    Specimen: Vagina; Swab   Result Value Ref Range    Trichomonas Absent Absent    Yeast Absent Absent    Clue Cells Absent Absent    WBCs/high power field 1+ (A) None   Urine Microscopic   Result Value Ref Range    Bacteria Urine Few (A) None Seen /HPF    RBC Urine 0-2 0-2 /HPF /HPF    WBC Urine 0-5 0-5 /HPF /HPF    Squamous Epithelials Urine Few (A) None Seen /LPF

## 2022-02-04 LAB — BACTERIA UR CULT: NO GROWTH

## 2022-02-20 ENCOUNTER — HEALTH MAINTENANCE LETTER (OUTPATIENT)
Age: 52
End: 2022-02-20

## 2022-05-19 ENCOUNTER — HOSPITAL ENCOUNTER (OUTPATIENT)
Dept: MAMMOGRAPHY | Facility: CLINIC | Age: 52
Discharge: HOME | End: 2022-05-19
Attending: FAMILY MEDICINE | Admitting: FAMILY MEDICINE
Payer: COMMERCIAL

## 2022-05-19 DIAGNOSIS — Z12.31 VISIT FOR SCREENING MAMMOGRAM: ICD-10-CM

## 2022-05-19 PROCEDURE — 77067 SCR MAMMO BI INCL CAD: CPT

## 2022-06-21 NOTE — PROGRESS NOTES
SUBJECTIVE:   CC: Letitia Yee is an 51 year old woman who presents for preventive health visit.     Healthy Habits:     Getting at least 3 servings of Calcium per day:  NO    Bi-annual eye exam:  Yes    Dental care twice a year:  NO    Sleep apnea or symptoms of sleep apnea:  Daytime drowsiness    Diet:  Vegetarian/vegan and Breakfast skipped    Frequency of exercise:  6-7 days/week    Duration of exercise:  45-60 minutes    Taking medications regularly:  Yes    Medication side effects:  Other    PHQ-2 Total Score: 1    Additional concerns today:  Yes    FDLMP: has a mirena IUD in place.  Patient does not recall the last time it was inserted.  States that it is likely more than 7 years.  Today's PHQ-2 Score:   PHQ-2 (  [employer]) 2022   Q1: Little interest or pleasure in doing things 1   Q2: Feeling down, depressed or hopeless 0   PHQ-2 Score 1   PHQ-2 Total Score (12-17 Years)- Positive if 3 or more points; Administer PHQ-A if positive -   Q1: Little interest or pleasure in doing things Several days   Q2: Feeling down, depressed or hopeless Not at all   PHQ-2 Score 1     PHQ 2021 2021 10/18/2021   PHQ-9 Total Score 12 2 0   Q9: Thoughts of better off dead/self-harm past 2 weeks Not at all Not at all Not at all       Abuse: Current or Past (Physical, Sexual or Emotional) - No  Do you feel safe in your environment? Yes    Have you ever done Advance Care Planning? (For example, a Health Directive, POLST, or a discussion with a medical provider or your loved ones about your wishes): No, advance care planning information given to patient to review.  Patient plans to discuss their wishes with loved ones or provider.      Social History     Tobacco Use     Smoking status: Former Smoker     Packs/day: 1.00     Years: 3.00     Pack years: 3.00     Types: Cigarettes     Start date:      Quit date:      Years since quittin.5     Smokeless tobacco: Never Used   Substance Use Topics      Alcohol use: Yes     Comment: Socially     If you drink alcohol do you typically have >3 drinks per day or >7 drinks per week? No    Alcohol Use 6/23/2022   Prescreen: >3 drinks/day or >7 drinks/week? No   Prescreen: >3 drinks/day or >7 drinks/week? -   No flowsheet data found.    Reviewed orders with patient.  Reviewed health maintenance and updated orders accordingly - Yes    Breast Cancer Screening:  Any new diagnosis of family breast, ovarian, or bowel cancer? No    FHS-7:   Breast CA Risk Assessment (FHS-7) 5/19/2022   Did any of your first-degree relatives have breast or ovarian cancer? No   Did any of your relatives have bilateral breast cancer? No   Did any man in your family have breast cancer? No   Did any woman in your family have breast and ovarian cancer? No   Did any woman in your family have breast cancer before age 50 y? No   Do you have 2 or more relatives with breast and/or ovarian cancer? No   Do you have 2 or more relatives with breast and/or bowel cancer? No     Pertinent mammograms are reviewed under the imaging tab.    History of abnormal Pap smear: NO - age 30-65 PAP every 5 years with negative HPV co-testing recommended  PAP / HPV Latest Ref Rng & Units 11/18/2019   PAP (Historical) - NIL   HPV16 NEG:Negative Negative   HPV18 NEG:Negative Negative   HRHPV NEG:Negative Negative     Reviewed and updated as needed this visit by clinical staff   Tobacco  Allergies  Meds   Med Hx  Surg Hx  Fam Hx  Soc Hx          Reviewed and updated as needed this visit by Provider     Meds                   Review of Systems   Constitutional: Negative for chills and fever.   HENT: Negative for congestion, ear pain, hearing loss and sore throat.    Eyes: Negative for pain and visual disturbance.   Respiratory: Positive for shortness of breath. Negative for cough.    Cardiovascular: Negative for chest pain, palpitations and peripheral edema.   Gastrointestinal: Negative for abdominal pain, constipation,  "diarrhea, heartburn, hematochezia and nausea.   Breasts:  Negative for tenderness, breast mass and discharge.   Genitourinary: Positive for vaginal bleeding. Negative for dysuria, frequency, genital sores, hematuria, pelvic pain, urgency and vaginal discharge.   Musculoskeletal: Positive for arthralgias. Negative for joint swelling and myalgias.   Skin: Negative for rash.   Neurological: Negative for dizziness, weakness, headaches and paresthesias.   Psychiatric/Behavioral: Negative for mood changes. The patient is not nervous/anxious.         OBJECTIVE:   /74   Pulse 59   Temp 96.8  F (36  C)   Resp 18   Ht 1.626 m (5' 4\")   Wt 62.1 kg (137 lb)   SpO2 98%   BMI 23.52 kg/m    Physical Exam  GENERAL: healthy, alert and no distress  EYES: Eyes grossly normal to inspection, PERRL and conjunctivae and sclerae normal  HENT: ear canals and TM's normal, nose and mouth without ulcers or lesions  NECK: no adenopathy, no asymmetry, masses, or scars and thyroid normal to palpation  RESP: lungs clear to auscultation - no rales, rhonchi or wheezes  BREAST: normal without masses, tenderness or nipple discharge and no palpable axillary masses or adenopathy  CV: regular rate and rhythm, normal S1 S2, no S3 or S4, no murmur, click or rub, no peripheral edema and peripheral pulses strong  ABDOMEN: soft, nontender, no hepatosplenomegaly, no masses and bowel sounds normal  MS: no gross musculoskeletal defects noted, no edema  SKIN: no suspicious lesions or rashes  NEURO: Normal strength and tone, mentation intact and speech normal  PSYCH: mentation appears normal, affect normal/bright    Diagnostic Test Results:  Labs reviewed in Epic    IUD removal    Intrauterine Device Removal Procedure Note  PRE-OP DIAGNOSIS: IUD removal  POST-OP DIAGNOSIS: Same   PROCEDURE: IUD removal  Performing Physician: Shayan Warren MD.    PROCEDURE: The speculum was placed and the IUD string visualized.  Using ringed forceps, the IUD string " was grasped and the device was removed without difficulty.  Bleeding was minimal.    Followup: The patient tolerated the procedure well without complications.  Standard post-procedure care is explained and return precautions are given.    ASSESSMENT/PLAN:       ICD-10-CM    1. Routine general medical examination at a health care facility  Z00.00    2. Need for hepatitis C screening test  Z11.59 Hepatitis C Screen Reflex to HCV RNA Quant and Genotype     Hepatitis C Screen Reflex to HCV RNA Quant and Genotype   3. Encounter for immunization  Z23 Pneumococcal 20 Valent Conjugate (Prevnar 20)   4. Menopausal syndrome (hot flashes)  N95.1 Follicle stimulating hormone     Follicle stimulating hormone   5. Adjustment disorder with depressed mood  F43.21 escitalopram (LEXAPRO) 10 MG tablet   6. Cold sore  B00.1 valACYclovir (VALTREX) 1000 mg tablet       Patient has been advised of split billing requirements and indicates understanding: No    COUNSELING:  Reviewed preventive health counseling, as reflected in patient instructions       Regular exercise       Healthy diet/nutrition      She reports that she quit smoking about 30 years ago. Her smoking use included cigarettes. She started smoking about 33 years ago. She has a 3.00 pack-year smoking history. She has never used smokeless tobacco.      Counseling Resources:  ATP IV Guidelines  Pooled Cohorts Equation Calculator  Breast Cancer Risk Calculator  BRCA-Related Cancer Risk Assessment: FHS-7 Tool  FRAX Risk Assessment  ICSI Preventive Guidelines  Dietary Guidelines for Americans, 2010  USDA's MyPlate  ASA Prophylaxis  Lung CA Screening    Shayan Warren MD  [facility]

## 2022-06-23 ENCOUNTER — OFFICE VISIT (OUTPATIENT)
Dept: FAMILY MEDICINE | Facility: CLINIC | Age: 52
End: 2022-06-23
Payer: COMMERCIAL

## 2022-06-23 VITALS
DIASTOLIC BLOOD PRESSURE: 74 MMHG | TEMPERATURE: 96.8 F | HEART RATE: 59 BPM | RESPIRATION RATE: 18 BRPM | HEIGHT: 64 IN | OXYGEN SATURATION: 98 % | BODY MASS INDEX: 23.39 KG/M2 | SYSTOLIC BLOOD PRESSURE: 110 MMHG | WEIGHT: 137 LBS

## 2022-06-23 DIAGNOSIS — Z23 ENCOUNTER FOR IMMUNIZATION: ICD-10-CM

## 2022-06-23 DIAGNOSIS — N95.1 MENOPAUSAL SYNDROME (HOT FLASHES): ICD-10-CM

## 2022-06-23 DIAGNOSIS — F43.21 ADJUSTMENT DISORDER WITH DEPRESSED MOOD: ICD-10-CM

## 2022-06-23 DIAGNOSIS — B00.1 COLD SORE: ICD-10-CM

## 2022-06-23 DIAGNOSIS — Z00.00 ROUTINE GENERAL MEDICAL EXAMINATION AT A HEALTH CARE FACILITY: Primary | ICD-10-CM

## 2022-06-23 DIAGNOSIS — Z11.59 NEED FOR HEPATITIS C SCREENING TEST: ICD-10-CM

## 2022-06-23 LAB
FSH SERPL IRP2-ACNC: 71.5 MIU/ML
HCV AB SERPL QL IA: NONREACTIVE

## 2022-06-23 PROCEDURE — 36415 COLL VENOUS BLD VENIPUNCTURE: CPT | Performed by: FAMILY MEDICINE

## 2022-06-23 PROCEDURE — 99396 PREV VISIT EST AGE 40-64: CPT | Mod: 25 | Performed by: FAMILY MEDICINE

## 2022-06-23 PROCEDURE — 83001 ASSAY OF GONADOTROPIN (FSH): CPT | Performed by: FAMILY MEDICINE

## 2022-06-23 PROCEDURE — 90471 IMMUNIZATION ADMIN: CPT | Performed by: FAMILY MEDICINE

## 2022-06-23 PROCEDURE — 90677 PCV20 VACCINE IM: CPT | Performed by: FAMILY MEDICINE

## 2022-06-23 PROCEDURE — 58301 REMOVE INTRAUTERINE DEVICE: CPT | Performed by: FAMILY MEDICINE

## 2022-06-23 PROCEDURE — 86803 HEPATITIS C AB TEST: CPT | Performed by: FAMILY MEDICINE

## 2022-06-23 RX ORDER — ESCITALOPRAM OXALATE 10 MG/1
10 TABLET ORAL DAILY
Qty: 90 TABLET | Refills: 3 | Status: SHIPPED | OUTPATIENT
Start: 2022-06-23 | End: 2023-06-09

## 2022-06-23 RX ORDER — VALACYCLOVIR HYDROCHLORIDE 1 G/1
2000 TABLET, FILM COATED ORAL 2 TIMES DAILY
Qty: 4 TABLET | Refills: 4 | Status: SHIPPED | OUTPATIENT
Start: 2022-06-23 | End: 2023-07-20

## 2022-06-23 ASSESSMENT — ENCOUNTER SYMPTOMS
MYALGIAS: 0
HEMATOCHEZIA: 0
CONSTIPATION: 0
CHILLS: 0
EYE PAIN: 0
BREAST MASS: 0
WEAKNESS: 0
DYSURIA: 0
PARESTHESIAS: 0
PALPITATIONS: 0
NAUSEA: 0
FEVER: 0
HEADACHES: 0
NERVOUS/ANXIOUS: 0
DIZZINESS: 0
SHORTNESS OF BREATH: 1
ARTHRALGIAS: 1
JOINT SWELLING: 0
FREQUENCY: 0
HEARTBURN: 0
SORE THROAT: 0
HEMATURIA: 0
DIARRHEA: 0
ABDOMINAL PAIN: 0
COUGH: 0

## 2022-06-23 NOTE — NURSING NOTE
"Chief Complaint   Patient presents with     Physical     initial Pulse 59   Temp 96.8  F (36  C)   Resp 18   Ht 1.626 m (5' 4\")   Wt 62.1 kg (137 lb)   SpO2 98%   BMI 23.52 kg/m   Estimated body mass index is 23.52 kg/m  as calculated from the following:    Height as of this encounter: 1.626 m (5' 4\").    Weight as of this encounter: 62.1 kg (137 lb).  BP completed using cuff size: regular.  L arm      Health Maintenance that is potentially due pending provider review:  NONE      Mindy Meadows RN    "

## 2022-09-27 ENCOUNTER — MYC MEDICAL ADVICE (OUTPATIENT)
Dept: FAMILY MEDICINE | Facility: CLINIC | Age: 52
End: 2022-09-27

## 2022-10-05 ENCOUNTER — VIRTUAL VISIT (OUTPATIENT)
Dept: FAMILY MEDICINE | Facility: CLINIC | Age: 52
End: 2022-10-05
Payer: COMMERCIAL

## 2022-10-05 DIAGNOSIS — F43.21 ADJUSTMENT DISORDER WITH DEPRESSED MOOD: ICD-10-CM

## 2022-10-05 PROCEDURE — 99213 OFFICE O/P EST LOW 20 MIN: CPT | Mod: 95 | Performed by: FAMILY MEDICINE

## 2022-10-05 NOTE — PROGRESS NOTES
MARKO is a 51 year old who is being evaluated via a billable video visit.      How would you like to obtain your AVS? MyChart  If the video visit is dropped, the invitation should be resent by: Send to e-mail at: [email or web address]  Will anyone else be joining your video visit? No      Assessment & Plan     Adjustment disorder with depressed mood  Status: doing well.  Assessment: Patient was considering lowering her medication but she states that she has a few stressful family events and is re-considering her decision to taper off.   Plan:   -I advised her to continue with the current dose.  Consider tapering the medication next summer.      No follow-ups on file.    Shayan Warren MD  [facility]   MARKO is a 51 year old , presenting for the following health issues:  Recheck Medication and Depression      HPI     Patient was considering lowering her prescription and possibly tapering off the medication.  But she is reconsidering this decision due to upcoming stressful events.  She has a family member with cancer.  Unsure about the prognosis at this time.    Review of Systems   Constitutional, HEENT, cardiovascular, pulmonary, gi and gu systems are negative, except as otherwise noted.      Objective           Vitals:  No vitals were obtained today due to virtual visit.    Physical Exam   GENERAL: Healthy, alert and no distress  EYES: Eyes grossly normal to inspection.  No discharge or erythema, or obvious scleral/conjunctival abnormalities.  RESP: No audible wheeze, cough, or visible cyanosis.  No visible retractions or increased work of breathing.    SKIN: Visible skin clear. No significant rash, abnormal pigmentation or lesions.  NEURO: Cranial nerves grossly intact.  Mentation and speech appropriate for age.  PSYCH: Mentation appears normal, affect normal/bright, judgement and insight intact, normal speech and appearance well-groomed.    No results found for any  visits on 10/05/22.          Telephone-Visit Details    Telephone Start Time: 05:33 PM    Type of service:  Video Visit    Video End Time:05:43 AM    Originating Location (pt. Location): Home    Distant Location (provider location):  [facility]     Platform used for Video Visit: [employer]

## 2022-10-23 ENCOUNTER — HEALTH MAINTENANCE LETTER (OUTPATIENT)
Age: 52
End: 2022-10-23

## 2022-11-14 ENCOUNTER — MYC MEDICAL ADVICE (OUTPATIENT)
Dept: FAMILY MEDICINE | Facility: CLINIC | Age: 52
End: 2022-11-14

## 2022-11-23 ENCOUNTER — MYC MEDICAL ADVICE (OUTPATIENT)
Dept: FAMILY MEDICINE | Facility: CLINIC | Age: 52
End: 2022-11-23

## 2022-12-16 ENCOUNTER — TELEPHONE (OUTPATIENT)
Dept: CARDIOLOGY | Facility: CLINIC | Age: 52
End: 2022-12-16

## 2022-12-16 DIAGNOSIS — I34.1 MITRAL VALVE PROLAPSE: Primary | ICD-10-CM

## 2022-12-16 DIAGNOSIS — I34.0 MITRAL VALVE INSUFFICIENCY, UNSPECIFIED ETIOLOGY: ICD-10-CM

## 2022-12-16 NOTE — TELEPHONE ENCOUNTER
[facility] Call Center    Phone Message    May a detailed message be left on voicemail: yes     Reason for Call: Other: Pt sent Virdiahart requesting a follow up with Dr. Solis and an echo prior in March.  Echo orders will  prior to March.  Please extend orders and call pt to schedule.       Action Taken: Message routed to:  Other: cardio    Travel Screening: Not Applicable

## 2022-12-16 NOTE — TELEPHONE ENCOUNTER
Echo order extended.     Next Dr. Solis OV 2-8-23    Last Dr. Will CALHOUN 1-7-22 - Recommendation:  Continue yearly surveillance with echocardiogram and clinic visit.  She does not need routine labs.

## 2023-01-17 DIAGNOSIS — G47.00 INSOMNIA, UNSPECIFIED TYPE: ICD-10-CM

## 2023-01-18 RX ORDER — LORAZEPAM 0.5 MG/1
0.5 TABLET ORAL
Qty: 30 TABLET | Refills: 1 | Status: SHIPPED | OUTPATIENT
Start: 2023-01-18 | End: 2023-07-20

## 2023-01-18 NOTE — TELEPHONE ENCOUNTER
Routing refill request to provider for review/approval because:  Drug not on the [facility] refill protocol   Last physical 06/2022  Mindy NAGEL RN

## 2023-02-09 ENCOUNTER — HOSPITAL ENCOUNTER (OUTPATIENT)
Dept: CARDIOLOGY | Facility: CLINIC | Age: 53
Discharge: HOME | End: 2023-02-09
Attending: INTERNAL MEDICINE | Admitting: INTERNAL MEDICINE
Payer: COMMERCIAL

## 2023-02-09 DIAGNOSIS — I34.1 MITRAL VALVE PROLAPSE: ICD-10-CM

## 2023-02-09 DIAGNOSIS — I34.0 MITRAL VALVE INSUFFICIENCY, UNSPECIFIED ETIOLOGY: ICD-10-CM

## 2023-02-09 PROCEDURE — 93306 TTE W/DOPPLER COMPLETE: CPT

## 2023-02-09 PROCEDURE — 93306 TTE W/DOPPLER COMPLETE: CPT | Mod: 26 | Performed by: INTERNAL MEDICINE

## 2023-04-06 ENCOUNTER — OFFICE VISIT (OUTPATIENT)
Dept: CARDIOLOGY | Facility: CLINIC | Age: 53
End: 2023-04-06
Payer: COMMERCIAL

## 2023-04-06 VITALS
HEART RATE: 58 BPM | SYSTOLIC BLOOD PRESSURE: 116 MMHG | WEIGHT: 139 LBS | DIASTOLIC BLOOD PRESSURE: 79 MMHG | BODY MASS INDEX: 23.73 KG/M2 | HEIGHT: 64 IN | OXYGEN SATURATION: 97 %

## 2023-04-06 DIAGNOSIS — I34.1 MITRAL VALVE PROLAPSE: ICD-10-CM

## 2023-04-06 DIAGNOSIS — I34.0 NONRHEUMATIC MITRAL VALVE REGURGITATION: Primary | ICD-10-CM

## 2023-04-06 PROCEDURE — 99214 OFFICE O/P EST MOD 30 MIN: CPT | Performed by: INTERNAL MEDICINE

## 2023-04-06 NOTE — PROGRESS NOTES
SERVICE DATE: 4/6/2023    REFERRING PROVIDER:  Referred Self, MD  No address on file    PRIMARY CARE PROVIDER:  Shayan Warren  [address]  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     She is a delightful, younger-appearing, 52-year-old  lady, slim, regular exerciser with physiological sinus bradycardia, , has 2 young children (12-year-old son and 21-year-old daughter).    Letitia is an avid runner and there has not been any interval change in exercise tolerance.  More recently, she has not been running as much but due to orthopedic limitation from hip pain.  She still walks extensively (has a dog) and does Pilates for core strengthening.  No symptoms.    I personally reviewed and images and independently interpreted her interval testing.    ECG shows physiological sinus bradycardia of 52 bpm with normal cardiac intervals.    Echocardiogram - thickened myxomatous mitral valve leaflets and mild to moderate mitral valve regurgitation, mild to moderate left atrial enlargement (with an AP dimension of 4.2 cm and volume index of 43 ml/m2), mildly dilated left ventricle with an end-diastolic diameter of 6.2 cm, end-systolic diameter of 3.9 cm, normal LVEF of 65-70%.  The MR jet is late systolic.    Labs reviewed and normal lipid panel, normal CBC, normal renal panel, normal TSH.    Physical exam is also consistent with mild mitral valve regurgitation with a midsystolic jet and late systolic murmur heard best at the apex and radiating into the axilla.  /79 mmHg, pulse 58 bpm.  Normal BMI of 23.    Excellent dental hygiene.  Sees a hygienist every 6 months.  Never had  "infective endocarditis or teeth issues.    DIAGNOSES/ASSESSMENT:  1.  Myxomatous mitral valve leaflets with mild to moderate mitral valve regurgitation, normal LVEF of 70%, asymptomatic, in sinus rhythm.  No interval change in symptoms, physical examination, exercise tolerance or echocardiographic findings.    PLAN:  1.  All testing reviewed in detail with Letitia and congratulated her on her excellent lifestyle.  2.  Per patient request, extend follow-up to every 2 years with an echocardiogram.  She will contact me if she develops interval symptoms.    Kelli Solis MD    Established patient.   Today's clinic visit entailed:  The level of medical decision making during this visit was of moderate complexity.      DIAGNOSTIC DATA:  I personally reviewed images and independently interpreted echocardiogram, ECG, labs as documented above.    PHYSICAL EXAMINATION:  Vitals: /79   Pulse 58   Ht 1.626 m (5' 4\")   Wt 63 kg (139 lb)   SpO2 97%   BMI 23.86 kg/m        Orders Placed This Encounter   Procedures     Follow-Up with Cardiologist           CURRENT MEDICATIONS:  Current Outpatient Medications   Medication Sig Dispense Refill     albuterol (PROAIR HFA/PROVENTIL HFA/VENTOLIN HFA) 108 (90 Base) MCG/ACT inhaler Inhale 2 puffs into the lungs every 4 hours as needed for shortness of breath / dyspnea or wheezing 1 Inhaler 5     escitalopram (LEXAPRO) 10 MG tablet Take 1 tablet (10 mg) by mouth daily 90 tablet 3     levonorgestrel (MIRENA) 20 MCG/24HR IUD 1 each by Intrauterine route once       LORazepam (ATIVAN) 0.5 MG tablet Take 1 tablet (0.5 mg) by mouth nightly as needed for agitation, anxiety or sleep 30 tablet 1     Probiotic Product (CULTURELLE PROBIOTICS PO)        valACYclovir (VALTREX) 1000 mg tablet Take 2 tablets (2,000 mg) by mouth 2 times daily 4 tablet 4         ALLERGIES:  Allergies   Allergen Reactions     Diphenhydramine Other (See Comments)     PN: Numbness and tingling.- 04/16/21: " States she can take this     Seasonal Allergies      Adhesive Tape Rash       PAST MEDICAL HISTORY:    Past Medical History:   Diagnosis Date     Arthritis 2018    in my avelle     Chronic fatigue      Former smoker      Hordeolum externum of right lower eyelid 2018     Migraine with aura and without status migrainosus, not intractable 3/7/2003    ibu sometimes help and sometimes it does not     Mitral regurgitation      Mitral valve prolapse      PAC (premature atrial contraction)      PVC's (premature ventricular contractions)      Sinus bradycardia      Uncomplicated asthma 1974    Have had little problem until this year, I think.       PAST SURGICAL HISTORY:    Past Surgical History:   Procedure Laterality Date     BIOPSY  2015    mole, benign     COLONOSCOPY N/A 2020    Procedure: COLONOSCOPY;  Surgeon: Dario Vieyra MD;  Location:  GI     GYN SURGERY      laproscopic cutting endometriosis       FAMILY HISTORY:    Family History   Problem Relation Age of Onset     Arthritis Mother      Depression Mother      Genitourinary Problems Mother      Cerebrovascular Disease Mother         at 92     Heart Defect Father      Hypertension Father      Hyperlipidemia Father      Alcoholism Sister      Depression Sister      Mental Illness Sister      Anxiety Disorder Sister      Alcoholism Brother      Depression Brother      Other Cancer Brother         caused by HPV     Asthma Maternal Grandfather      Skin Cancer Brother      Mental Illness Sister      Substance Abuse Sister        SOCIAL HISTORY:    Social History     Socioeconomic History     Marital status:    Tobacco Use     Smoking status: Former     Packs/day: 1.00     Years: 3.00     Pack years: 3.00     Types: Cigarettes     Start date:      Quit date:      Years since quittin.2     Smokeless tobacco: Never   Vaping Use     Vaping status: Never Used   Substance and Sexual Activity     Alcohol use: Yes      Comment: Socially     Drug use: No     Sexual activity: Yes     Partners: Male     Birth control/protection: I.U.D.     Comment: Connie   Other Topics Concern     Parent/sibling w/ CABG, MI or angioplasty before 65F 55M? No

## 2023-04-06 NOTE — LETTER
4/6/2023    Shayan Warren MD  [address]  [facility] [address]    RE: Letitia Yee       Dear Colleague,     I had the pleasure of seeing Letitia Yee in the [facility].    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     She is a delightful, younger-appearing, 52-year-old  lady, slim, regular exerciser with physiological sinus bradycardia, , has 2 young children (12-year-old son and 21-year-old daughter).    Letitia is an avid runner and there has not been any interval change in exercise tolerance.  More recently, she has not been running as much but due to orthopedic limitation from hip pain.  She still walks extensively (has a dog) and does Pilates for core strengthening.  No symptoms.    I personally reviewed and images and independently interpreted her interval testing.    ECG shows physiological sinus bradycardia of 52 bpm with normal cardiac intervals.    Echocardiogram - thickened myxomatous mitral valve leaflets and mild to moderate mitral valve regurgitation, mild to moderate left atrial enlargement (with an AP dimension of 4.2 cm and volume index of 43 ml/m2), mildly dilated left ventricle with an end-diastolic diameter of 6.2 cm, end-systolic diameter of 3.9 cm, normal LVEF of 65-70%.  The MR jet is late systolic.    Labs reviewed and normal lipid panel, normal CBC, normal renal panel, normal TSH.    Physical exam is also consistent with mild mitral valve regurgitation with  "a midsystolic jet and late systolic murmur heard best at the apex and radiating into the axilla.  /79 mmHg, pulse 58 bpm.  Normal BMI of 23.    Excellent dental hygiene.  Sees a hygienist every 6 months.  Never had infective endocarditis or teeth issues.    DIAGNOSES/ASSESSMENT:  1.  Myxomatous mitral valve leaflets with mild to moderate mitral valve regurgitation, normal LVEF of 70%, asymptomatic, in sinus rhythm.  No interval change in symptoms, physical examination, exercise tolerance or echocardiographic findings.    PLAN:  1.  All testing reviewed in detail with Letitia and congratulated her on her excellent lifestyle.  2.  Per patient request, extend follow-up to every 2 years with an echocardiogram.  She will contact me if she develops interval symptoms.    Kelli Solis MD    Established patient.   Today's clinic visit entailed:  The level of medical decision making during this visit was of moderate complexity.      DIAGNOSTIC DATA:  I personally reviewed images and independently interpreted echocardiogram, ECG, labs as documented above.    PHYSICAL EXAMINATION:  Vitals: /79   Pulse 58   Ht 1.626 m (5' 4\")   Wt 63 kg (139 lb)   SpO2 97%   BMI 23.86 kg/m        Orders Placed This Encounter   Procedures    Follow-Up with Cardiologist           CURRENT MEDICATIONS:  Current Outpatient Medications   Medication Sig Dispense Refill    albuterol (PROAIR HFA/PROVENTIL HFA/VENTOLIN HFA) 108 (90 Base) MCG/ACT inhaler Inhale 2 puffs into the lungs every 4 hours as needed for shortness of breath / dyspnea or wheezing 1 Inhaler 5    escitalopram (LEXAPRO) 10 MG tablet Take 1 tablet (10 mg) by mouth daily 90 tablet 3    levonorgestrel (MIRENA) 20 MCG/24HR IUD 1 each by Intrauterine route once      LORazepam (ATIVAN) 0.5 MG tablet Take 1 tablet (0.5 mg) by mouth nightly as needed for agitation, anxiety or sleep 30 tablet 1    Probiotic Product (CULTURELLE PROBIOTICS PO)       valACYclovir (VALTREX) " 1000 mg tablet Take 2 tablets (2,000 mg) by mouth 2 times daily 4 tablet 4         ALLERGIES:  Allergies   Allergen Reactions    Diphenhydramine Other (See Comments)     PN: Numbness and tingling.- 04/16/21: States she can take this    Seasonal Allergies     Adhesive Tape Rash       PAST MEDICAL HISTORY:    Past Medical History:   Diagnosis Date    Arthritis 8/1/2018    in my knuckle    Chronic fatigue     Former smoker     Hordeolum externum of right lower eyelid 6/7/2018    Migraine with aura and without status migrainosus, not intractable 3/7/2003    ibu sometimes help and sometimes it does not    Mitral regurgitation     Mitral valve prolapse     PAC (premature atrial contraction)     PVC's (premature ventricular contractions)     Sinus bradycardia     Uncomplicated asthma 1/1/1974    Have had little problem until this year, I think.       PAST SURGICAL HISTORY:    Past Surgical History:   Procedure Laterality Date    BIOPSY  2015    mole, benign    COLONOSCOPY N/A 1/30/2020    Procedure: COLONOSCOPY;  Surgeon: Dario Vieyra MD;  Location:  GI    GYN SURGERY  2009    laproscopic cutting endometriosis       FAMILY HISTORY:    Family History   Problem Relation Age of Onset    Arthritis Mother     Depression Mother     Genitourinary Problems Mother     Cerebrovascular Disease Mother         at 92    Heart Defect Father     Hypertension Father     Hyperlipidemia Father     Alcoholism Sister     Depression Sister     Mental Illness Sister     Anxiety Disorder Sister     Alcoholism Brother     Depression Brother     Other Cancer Brother         caused by HPV    Asthma Maternal Grandfather     Skin Cancer Brother     Mental Illness Sister     Substance Abuse Sister        SOCIAL HISTORY:    Social History     Socioeconomic History    Marital status:    Tobacco Use    Smoking status: Former     Packs/day: 1.00     Years: 3.00     Pack years: 3.00     Types: Cigarettes     Start date: 1989     Quit  date:      Years since quittin.2    Smokeless tobacco: Never   Vaping Use    Vaping status: Never Used   Substance and Sexual Activity    Alcohol use: Yes     Comment: Socially    Drug use: No    Sexual activity: Yes     Partners: Male     Birth control/protection: I.U.D.     Comment: Connie   Other Topics Concern    Parent/sibling w/ CABG, MI or angioplasty before 65F 55M? No       Thank you for allowing me to participate in the care of your patient.      Sincerely,     Kelli Solis MD     [facility]  cc:   Referred Self,

## 2023-05-23 ENCOUNTER — ANCILLARY PROCEDURE (OUTPATIENT)
Dept: MAMMOGRAPHY | Facility: CLINIC | Age: 53
End: 2023-05-23
Attending: FAMILY MEDICINE
Payer: COMMERCIAL

## 2023-05-23 DIAGNOSIS — Z12.31 VISIT FOR SCREENING MAMMOGRAM: ICD-10-CM

## 2023-05-23 PROCEDURE — 77067 SCR MAMMO BI INCL CAD: CPT | Mod: TC | Performed by: RADIOLOGY

## 2023-06-08 ENCOUNTER — MYC MEDICAL ADVICE (OUTPATIENT)
Dept: FAMILY MEDICINE | Facility: CLINIC | Age: 53
End: 2023-06-08
Payer: COMMERCIAL

## 2023-06-08 DIAGNOSIS — F43.21 ADJUSTMENT DISORDER WITH DEPRESSED MOOD: ICD-10-CM

## 2023-06-09 RX ORDER — ESCITALOPRAM OXALATE 10 MG/1
10 TABLET ORAL DAILY
Qty: 30 TABLET | Refills: 0 | Status: SHIPPED | OUTPATIENT
Start: 2023-06-09 | End: 2023-07-10

## 2023-06-09 NOTE — TELEPHONE ENCOUNTER
Medication is being filled for 1 time refill only due to:  Patient needs to be seen because due for physical.     Last 6/23/22    Jelena Camejo RN

## 2023-07-10 DIAGNOSIS — F43.21 ADJUSTMENT DISORDER WITH DEPRESSED MOOD: ICD-10-CM

## 2023-07-10 RX ORDER — ESCITALOPRAM OXALATE 10 MG/1
10 TABLET ORAL DAILY
Qty: 30 TABLET | Refills: 0 | Status: SHIPPED | OUTPATIENT
Start: 2023-07-10 | End: 2023-07-20

## 2023-07-10 NOTE — TELEPHONE ENCOUNTER
One time refill sent 6/9/23.  Due for physical    Future OV 7/20 - physical.    Refill sent for one more month supply.    Jelena Camejo RN

## 2023-07-20 ENCOUNTER — OFFICE VISIT (OUTPATIENT)
Dept: FAMILY MEDICINE | Facility: CLINIC | Age: 53
End: 2023-07-20
Payer: COMMERCIAL

## 2023-07-20 VITALS
DIASTOLIC BLOOD PRESSURE: 76 MMHG | RESPIRATION RATE: 14 BRPM | TEMPERATURE: 97.3 F | HEIGHT: 64 IN | SYSTOLIC BLOOD PRESSURE: 127 MMHG | HEART RATE: 52 BPM | BODY MASS INDEX: 22.43 KG/M2 | OXYGEN SATURATION: 98 % | WEIGHT: 131.4 LBS

## 2023-07-20 DIAGNOSIS — B00.1 COLD SORE: ICD-10-CM

## 2023-07-20 DIAGNOSIS — G47.00 INSOMNIA, UNSPECIFIED TYPE: ICD-10-CM

## 2023-07-20 DIAGNOSIS — N95.2 ATROPHIC VAGINITIS: ICD-10-CM

## 2023-07-20 DIAGNOSIS — Z00.00 ROUTINE GENERAL MEDICAL EXAMINATION AT A HEALTH CARE FACILITY: Primary | ICD-10-CM

## 2023-07-20 DIAGNOSIS — F43.21 ADJUSTMENT DISORDER WITH DEPRESSED MOOD: ICD-10-CM

## 2023-07-20 LAB
ERYTHROCYTE [DISTWIDTH] IN BLOOD BY AUTOMATED COUNT: 11.5 % (ref 10–15)
HBA1C MFR BLD: 5 % (ref 0–5.6)
HCT VFR BLD AUTO: 41.8 % (ref 35–47)
HGB BLD-MCNC: 14.4 G/DL (ref 11.7–15.7)
MCH RBC QN AUTO: 30.9 PG (ref 26.5–33)
MCHC RBC AUTO-ENTMCNC: 34.4 G/DL (ref 31.5–36.5)
MCV RBC AUTO: 90 FL (ref 78–100)
PLATELET # BLD AUTO: 214 10E3/UL (ref 150–450)
RBC # BLD AUTO: 4.66 10E6/UL (ref 3.8–5.2)
WBC # BLD AUTO: 4.1 10E3/UL (ref 4–11)

## 2023-07-20 PROCEDURE — 83036 HEMOGLOBIN GLYCOSYLATED A1C: CPT | Performed by: FAMILY MEDICINE

## 2023-07-20 PROCEDURE — 80053 COMPREHEN METABOLIC PANEL: CPT | Performed by: FAMILY MEDICINE

## 2023-07-20 PROCEDURE — 36415 COLL VENOUS BLD VENIPUNCTURE: CPT | Performed by: FAMILY MEDICINE

## 2023-07-20 PROCEDURE — 99396 PREV VISIT EST AGE 40-64: CPT | Performed by: FAMILY MEDICINE

## 2023-07-20 PROCEDURE — 80061 LIPID PANEL: CPT | Performed by: FAMILY MEDICINE

## 2023-07-20 PROCEDURE — 85027 COMPLETE CBC AUTOMATED: CPT | Performed by: FAMILY MEDICINE

## 2023-07-20 RX ORDER — VALACYCLOVIR HYDROCHLORIDE 1 G/1
2000 TABLET, FILM COATED ORAL 2 TIMES DAILY
Qty: 4 TABLET | Refills: 4 | Status: SHIPPED | OUTPATIENT
Start: 2023-07-20

## 2023-07-20 RX ORDER — ESCITALOPRAM OXALATE 10 MG/1
10 TABLET ORAL DAILY
Qty: 90 TABLET | Refills: 3 | Status: SHIPPED | OUTPATIENT
Start: 2023-07-20 | End: 2024-07-22

## 2023-07-20 RX ORDER — LORAZEPAM 0.5 MG/1
0.5 TABLET ORAL
Qty: 30 TABLET | Refills: 1 | Status: SHIPPED | OUTPATIENT
Start: 2023-07-20

## 2023-07-20 RX ORDER — ESTRADIOL 0.1 MG/G
2 CREAM VAGINAL
Qty: 42.5 G | Refills: 0 | Status: SHIPPED | OUTPATIENT
Start: 2023-07-20

## 2023-07-20 ASSESSMENT — ENCOUNTER SYMPTOMS
CHILLS: 0
ABDOMINAL PAIN: 0
MYALGIAS: 0
NAUSEA: 0
COUGH: 0
DYSURIA: 0
PARESTHESIAS: 0
HEADACHES: 0
FREQUENCY: 0
SORE THROAT: 0
HEMATOCHEZIA: 0
BREAST MASS: 0
PALPITATIONS: 0
ARTHRALGIAS: 0
WEAKNESS: 0
EYE PAIN: 0
HEARTBURN: 0
SHORTNESS OF BREATH: 0
HEMATURIA: 0
DIARRHEA: 0
NERVOUS/ANXIOUS: 0
JOINT SWELLING: 0
CONSTIPATION: 0
DIZZINESS: 0
FEVER: 0

## 2023-07-20 ASSESSMENT — ANXIETY QUESTIONNAIRES
6. BECOMING EASILY ANNOYED OR IRRITABLE: SEVERAL DAYS
1. FEELING NERVOUS, ANXIOUS, OR ON EDGE: SEVERAL DAYS
IF YOU CHECKED OFF ANY PROBLEMS ON THIS QUESTIONNAIRE, HOW DIFFICULT HAVE THESE PROBLEMS MADE IT FOR YOU TO DO YOUR WORK, TAKE CARE OF THINGS AT HOME, OR GET ALONG WITH OTHER PEOPLE: NOT DIFFICULT AT ALL
GAD7 TOTAL SCORE: 2
GAD7 TOTAL SCORE: 2
5. BEING SO RESTLESS THAT IT IS HARD TO SIT STILL: NOT AT ALL
2. NOT BEING ABLE TO STOP OR CONTROL WORRYING: NOT AT ALL
7. FEELING AFRAID AS IF SOMETHING AWFUL MIGHT HAPPEN: NOT AT ALL
4. TROUBLE RELAXING: NOT AT ALL
3. WORRYING TOO MUCH ABOUT DIFFERENT THINGS: NOT AT ALL

## 2023-07-20 ASSESSMENT — PATIENT HEALTH QUESTIONNAIRE - PHQ9
10. IF YOU CHECKED OFF ANY PROBLEMS, HOW DIFFICULT HAVE THESE PROBLEMS MADE IT FOR YOU TO DO YOUR WORK, TAKE CARE OF THINGS AT HOME, OR GET ALONG WITH OTHER PEOPLE: NOT DIFFICULT AT ALL
SUM OF ALL RESPONSES TO PHQ QUESTIONS 1-9: 2
SUM OF ALL RESPONSES TO PHQ QUESTIONS 1-9: 2

## 2023-07-20 ASSESSMENT — PAIN SCALES - GENERAL: PAINLEVEL: NO PAIN (0)

## 2023-07-20 NOTE — PROGRESS NOTES
SUBJECTIVE:   CC: Letitia is an 52 year old who presents for preventive health visit.       2023    11:12 AM   Additional Questions   Roomed by Neel RAYMOND     Healthy Habits:     Getting at least 3 servings of Calcium per day:  NO    Bi-annual eye exam:  Yes    Dental care twice a year:  Yes    Sleep apnea or symptoms of sleep apnea:  Daytime drowsiness    Diet:  Vegetarian/vegan and Breakfast skipped    Frequency of exercise:  6-7 days/week    Duration of exercise:  45-60 minutes    Taking medications regularly:  Yes    Medication side effects:  Other    Additional concerns today:  No    Answers for HPI/ROS submitted by the patient on 2023  If you checked off any problems, how difficult have these problems made it for you to do your work, take care of things at home, or get along with other people?: Not difficult at all  PHQ9 TOTAL SCORE: 2  JOSE 7 TOTAL SCORE: 2    Social History     Tobacco Use    Smoking status: Former     Packs/day: 1.00     Years: 3.00     Pack years: 3.00     Types: Cigarettes     Start date:      Quit date:      Years since quittin.5    Smokeless tobacco: Never   Substance Use Topics    Alcohol use: Yes     Comment: Socially         2023    11:18 AM   Alcohol Use   Prescreen: >3 drinks/day or >7 drinks/week? No     Reviewed orders with patient.  Reviewed health maintenance and updated orders accordingly - Yes  Lab work is in process    Breast Cancer Screenin/23/2022     9:26 AM   Breast CA Risk Assessment (FHS-7)   Do you have a family history of breast, colon, or ovarian cancer? No / Unknown     History of abnormal Pap smear: NO - age 30-65 PAP every 5 years with negative HPV co-testing recommended      Latest Ref Rng & Units 2019     9:58 AM 2019     9:30 AM   PAP / HPV   PAP (Historical)   NIL    HPV 16 DNA NEG^Negative Negative     HPV 18 DNA NEG^Negative Negative     Other HR HPV NEG^Negative Negative       Reviewed and updated as needed  "this visit by clinical staff   Tobacco  Allergies  Meds              Reviewed and updated as needed this visit by Provider     Meds             Review of Systems   Constitutional: Negative for chills and fever.   HENT: Negative for congestion, ear pain, hearing loss and sore throat.    Eyes: Negative for pain and visual disturbance.   Respiratory: Negative for cough and shortness of breath.    Cardiovascular: Negative for chest pain, palpitations and peripheral edema.   Gastrointestinal: Negative for abdominal pain, constipation, diarrhea, heartburn, hematochezia and nausea.   Breasts:  Negative for tenderness, breast mass and discharge.   Genitourinary: Negative for dysuria, frequency, genital sores, hematuria, pelvic pain, urgency, vaginal bleeding and vaginal discharge.   Musculoskeletal: Negative for arthralgias, joint swelling and myalgias.   Skin: Negative for rash.   Neurological: Negative for dizziness, weakness, headaches and paresthesias.   Psychiatric/Behavioral: Negative for mood changes. The patient is not nervous/anxious.       OBJECTIVE:   /76   Pulse 52   Temp 97.3  F (36.3  C) (Temporal)   Resp 14   Ht 1.615 m (5' 3.6\")   Wt 59.6 kg (131 lb 6.4 oz)   LMP  (LMP Unknown)   SpO2 98%   BMI 22.84 kg/m    Physical Exam  GENERAL: healthy, alert and no distress  EYES: Eyes grossly normal to inspection, PERRL and conjunctivae and sclerae normal  HENT: ear canals and TM's normal, nose and mouth without ulcers or lesions  NECK: no adenopathy, no asymmetry, masses, or scars and thyroid normal to palpation  RESP: lungs clear to auscultation - no rales, rhonchi or wheezes  BREAST: normal without masses, tenderness or nipple discharge and no palpable axillary masses or adenopathy  CV: regular rate and rhythm, normal S1 S2, no S3 or S4, no murmur, click or rub, no peripheral edema and peripheral pulses strong  ABDOMEN: soft, nontender, no hepatosplenomegaly, no masses and bowel sounds normal  MS: " no gross musculoskeletal defects noted, no edema  SKIN: no suspicious lesions or rashes  NEURO: Normal strength and tone, mentation intact and speech normal  PSYCH: mentation appears normal, affect normal/bright    Diagnostic Test Results:  Labs reviewed in Epic  Results for orders placed or performed in visit on 07/20/23   Lipid panel reflex to direct LDL Fasting     Status: Normal   Result Value Ref Range    Cholesterol 166 <200 mg/dL    Triglycerides 45 <150 mg/dL    Direct Measure HDL 67 >=50 mg/dL    LDL Cholesterol Calculated 90 <=100 mg/dL    Non HDL Cholesterol 99 <130 mg/dL    Narrative    Cholesterol  Desirable:  <200 mg/dL    Triglycerides  Normal:  Less than 150 mg/dL  Borderline High:  150-199 mg/dL  High:  200-499 mg/dL  Very High:  Greater than or equal to 500 mg/dL    Direct Measure HDL  Female:  Greater than or equal to 50 mg/dL   Male:  Greater than or equal to 40 mg/dL    LDL Cholesterol  Desirable:  <100mg/dL  Above Desirable:  100-129 mg/dL   Borderline High:  130-159 mg/dL   High:  160-189 mg/dL   Very High:  >= 190 mg/dL    Non HDL Cholesterol  Desirable:  130 mg/dL  Above Desirable:  130-159 mg/dL  Borderline High:  160-189 mg/dL  High:  190-219 mg/dL  Very High:  Greater than or equal to 220 mg/dL   Comprehensive metabolic panel     Status: Normal   Result Value Ref Range    Sodium 137 136 - 145 mmol/L    Potassium 3.9 3.4 - 5.3 mmol/L    Chloride 101 98 - 107 mmol/L    Carbon Dioxide (CO2) 28 22 - 29 mmol/L    Anion Gap 8 7 - 15 mmol/L    Urea Nitrogen 12.2 6.0 - 20.0 mg/dL    Creatinine 0.82 0.51 - 0.95 mg/dL    Calcium 9.6 8.6 - 10.0 mg/dL    Glucose 72 70 - 99 mg/dL    Alkaline Phosphatase 55 35 - 104 U/L    AST 26 0 - 45 U/L    ALT 11 0 - 50 U/L    Protein Total 7.1 6.4 - 8.3 g/dL    Albumin 4.7 3.5 - 5.2 g/dL    Bilirubin Total 0.7 <=1.2 mg/dL    GFR Estimate 85 >60 mL/min/1.73m2   CBC with platelets     Status: Normal   Result Value Ref Range    WBC Count 4.1 4.0 - 11.0 10e3/uL    RBC  Count 4.66 3.80 - 5.20 10e6/uL    Hemoglobin 14.4 11.7 - 15.7 g/dL    Hematocrit 41.8 35.0 - 47.0 %    MCV 90 78 - 100 fL    MCH 30.9 26.5 - 33.0 pg    MCHC 34.4 31.5 - 36.5 g/dL    RDW 11.5 10.0 - 15.0 %    Platelet Count 214 150 - 450 10e3/uL   Hemoglobin A1c     Status: Normal   Result Value Ref Range    Hemoglobin A1C 5.0 0.0 - 5.6 %       ASSESSMENT/PLAN:   Letitia was seen today for physical.    Diagnoses and all orders for this visit:    Routine general medical examination at a health care facility  -     REVIEW OF HEALTH MAINTENANCE PROTOCOL ORDERS  -     Lipid panel reflex to direct LDL Fasting; Future  -     Comprehensive metabolic panel; Future  -     CBC with platelets; Future  -     Hemoglobin A1c; Future    Cold sore  -     valACYclovir (VALTREX) 1000 mg tablet; Take 2 tablets (2,000 mg) by mouth 2 times daily    Adjustment disorder with depressed mood  -     escitalopram (LEXAPRO) 10 MG tablet; Take 1 tablet (10 mg) by mouth daily    Insomnia, unspecified type  -     LORazepam (ATIVAN) 0.5 MG tablet; Take 1 tablet (0.5 mg) by mouth nightly as needed for agitation, anxiety or sleep    Atrophic vaginitis  -     estradiol (ESTRACE) 0.1 MG/GM vaginal cream; Place 2 g vaginally twice a week          Patient has been advised of split billing requirements and indicates understanding: No      COUNSELING:  Reviewed preventive health counseling, as reflected in patient instructions       Regular exercise       Healthy diet/nutrition        She reports that she quit smoking about 31 years ago. Her smoking use included cigarettes. She started smoking about 34 years ago. She has a 3.00 pack-year smoking history. She has never used smokeless tobacco.        Shayan Warren MD  [facility]

## 2023-07-21 LAB
ALBUMIN SERPL BCG-MCNC: 4.7 G/DL (ref 3.5–5.2)
ALP SERPL-CCNC: 55 U/L (ref 35–104)
ALT SERPL W P-5'-P-CCNC: 11 U/L (ref 0–50)
ANION GAP SERPL CALCULATED.3IONS-SCNC: 8 MMOL/L (ref 7–15)
AST SERPL W P-5'-P-CCNC: 26 U/L (ref 0–45)
BILIRUB SERPL-MCNC: 0.7 MG/DL
BUN SERPL-MCNC: 12.2 MG/DL (ref 6–20)
CALCIUM SERPL-MCNC: 9.6 MG/DL (ref 8.6–10)
CHLORIDE SERPL-SCNC: 101 MMOL/L (ref 98–107)
CHOLEST SERPL-MCNC: 166 MG/DL
CREAT SERPL-MCNC: 0.82 MG/DL (ref 0.51–0.95)
DEPRECATED HCO3 PLAS-SCNC: 28 MMOL/L (ref 22–29)
GFR SERPL CREATININE-BSD FRML MDRD: 85 ML/MIN/1.73M2
GLUCOSE SERPL-MCNC: 72 MG/DL (ref 70–99)
HDLC SERPL-MCNC: 67 MG/DL
LDLC SERPL CALC-MCNC: 90 MG/DL
NONHDLC SERPL-MCNC: 99 MG/DL
POTASSIUM SERPL-SCNC: 3.9 MMOL/L (ref 3.4–5.3)
PROT SERPL-MCNC: 7.1 G/DL (ref 6.4–8.3)
SODIUM SERPL-SCNC: 137 MMOL/L (ref 136–145)
TRIGL SERPL-MCNC: 45 MG/DL

## 2023-11-03 ENCOUNTER — OFFICE VISIT (OUTPATIENT)
Dept: FAMILY MEDICINE | Facility: CLINIC | Age: 53
End: 2023-11-03

## 2023-11-03 VITALS
OXYGEN SATURATION: 97 % | WEIGHT: 129.9 LBS | HEIGHT: 64 IN | SYSTOLIC BLOOD PRESSURE: 121 MMHG | TEMPERATURE: 97.5 F | RESPIRATION RATE: 16 BRPM | BODY MASS INDEX: 22.18 KG/M2 | HEART RATE: 57 BPM | DIASTOLIC BLOOD PRESSURE: 75 MMHG

## 2023-11-03 DIAGNOSIS — L03.213 PRESEPTAL CELLULITIS OF LEFT EYE: Primary | ICD-10-CM

## 2023-11-03 PROCEDURE — 99213 OFFICE O/P EST LOW 20 MIN: CPT | Performed by: FAMILY MEDICINE

## 2023-11-03 ASSESSMENT — PAIN SCALES - GENERAL: PAINLEVEL: MILD PAIN (2)

## 2023-11-03 NOTE — PROGRESS NOTES
"  Assessment & Plan     Preseptal cellulitis of left eye  Reviewed diagnosis and signs and symptoms to monitor for  Will start abx but if not responding in 24-48 hours or getting worse at any time she is aware she should go to ER because of risk for orbital cellulits  - amoxicillin-clavulanate (AUGMENTIN) 875-125 MG tablet; Take 1 tablet by mouth 2 times daily  Pt will call or RTC if symptoms worsen or do not improve.                   Jnaine MLHira DO Mike  [facility] is [facility] 52 [facility], presenting for the following health issues:  Eye Problem        11/3/2023     1:31 PM   Additional Questions   Roomed by HECTOR Whitley       History of Present Illness       Reason for visit:  Eye infection  Symptom onset:  1-2 weeks ago  Symptoms include:  Tearing, redness, swelling, itchiness, irritation, pain  Symptom intensity:  Moderate  Symptom progression:  Worsening    She eats 2-3 servings of fruits and vegetables daily.She consumes 1 sweetened beverage(s) daily.She exercises with enough effort to increase her heart rate 10 to 19 minutes per day.  She exercises with enough effort to increase her heart rate 6 days per week.   She is taking medications regularly.                 Review of Systems   Constitutional, HEENT, cardiovascular, pulmonary, gi and gu systems are negative, except as otherwise noted.      Objective    /75   Pulse 57   Temp 97.5  F (36.4  C) (Temporal)   Resp 16   Ht 1.613 m (5' 3.5\")   Wt 58.9 kg (129 lb 14.4 oz)   LMP  (LMP Unknown)   SpO2 97%   BMI 22.65 kg/m    Body mass index is 22.65 kg/m .  Physical Exam   GENERAL: healthy, alert and no distress  EYES: left eye - swollen erythematous upper lid and lower lid -   Conjunctiva clear with mild purulent drainage on the lower lid  - EOMI without proptosis                       "

## 2023-11-10 ENCOUNTER — TELEPHONE (OUTPATIENT)
Dept: FAMILY MEDICINE | Facility: CLINIC | Age: 53
End: 2023-11-10

## 2023-11-10 NOTE — TELEPHONE ENCOUNTER
PN,  Patient calling to follow-up on recent cellulitis diagnosis.  States she completed last dose of antibiotics this morning.  Most symptoms are resolved, but lump is still present on her eye.  Also still getting a bit of crustiness in the morning.  Swelling has completely resolved.  Wondering if she needs additional course of antibiotics to help finish addressing this.  Please advise.  Litzy LEE RN

## 2023-11-10 NOTE — TELEPHONE ENCOUNTER
I would like to hold off on additional if the redness and swelling are all improved.  That small lump may be a stye or hordeolum.  She can apply warm wet wash cloths to the area 10 minutes 2-3 times a day.  If redness worsens may need antibiotic but that was for cellulitis.  Thanks  PN

## 2023-11-15 ENCOUNTER — TELEPHONE (OUTPATIENT)
Dept: FAMILY MEDICINE | Facility: CLINIC | Age: 53
End: 2023-11-15

## 2023-11-15 DIAGNOSIS — L03.213 PRESEPTAL CELLULITIS OF LEFT EYE: ICD-10-CM

## 2023-11-15 NOTE — TELEPHONE ENCOUNTER
Patient updated  Verbalized understanding  Will call back with new or worsening symptoms  Plans to call ophthalmologist to possibly schedule appointment in advance, if needed  Will  Rx rose NAGEL RN

## 2023-11-15 NOTE — TELEPHONE ENCOUNTER
Can do one more round of antibiotics - faxed prescription  If persisting we may want to have her see ophthalmology - does she have one she sees?  Continue warm compress     PN

## 2023-11-15 NOTE — TELEPHONE ENCOUNTER
PN,          Patient called.  Saw you 11/3 for cellulitis left eye  Finished abx.  Eye was doing well, stye still there.    States she thinks cellulitis has returned.  Has a little swelling on eye lid and below the eye.  States this is how it started last time.      Thanks,  Jelena Camejo RN

## 2024-06-20 ENCOUNTER — MYC MEDICAL ADVICE (OUTPATIENT)
Dept: FAMILY MEDICINE | Facility: CLINIC | Age: 54
End: 2024-06-20
Payer: COMMERCIAL

## 2024-07-22 DIAGNOSIS — F43.21 ADJUSTMENT DISORDER WITH DEPRESSED MOOD: ICD-10-CM

## 2024-07-22 RX ORDER — ESCITALOPRAM OXALATE 10 MG/1
10 TABLET ORAL DAILY
Qty: 90 TABLET | Refills: 3 | Status: SHIPPED | OUTPATIENT
Start: 2024-07-22

## 2024-09-12 ENCOUNTER — HOSPITAL ENCOUNTER (OUTPATIENT)
Dept: MAMMOGRAPHY | Facility: CLINIC | Age: 54
Discharge: HOME | End: 2024-09-12
Attending: FAMILY MEDICINE | Admitting: FAMILY MEDICINE
Payer: COMMERCIAL

## 2024-09-12 DIAGNOSIS — Z12.31 VISIT FOR SCREENING MAMMOGRAM: ICD-10-CM

## 2024-09-12 PROCEDURE — 77063 BREAST TOMOSYNTHESIS BI: CPT

## 2024-10-14 ENCOUNTER — OFFICE VISIT (OUTPATIENT)
Dept: FAMILY MEDICINE | Facility: CLINIC | Age: 54
End: 2024-10-14
Payer: COMMERCIAL

## 2024-10-14 VITALS
WEIGHT: 137 LBS | BODY MASS INDEX: 23.39 KG/M2 | HEART RATE: 56 BPM | RESPIRATION RATE: 16 BRPM | SYSTOLIC BLOOD PRESSURE: 126 MMHG | DIASTOLIC BLOOD PRESSURE: 70 MMHG | OXYGEN SATURATION: 99 % | HEIGHT: 64 IN | TEMPERATURE: 97.3 F

## 2024-10-14 DIAGNOSIS — F43.21 ADJUSTMENT DISORDER WITH DEPRESSED MOOD: ICD-10-CM

## 2024-10-14 DIAGNOSIS — K59.1 FUNCTIONAL DIARRHEA: ICD-10-CM

## 2024-10-14 DIAGNOSIS — Z12.4 CERVICAL CANCER SCREENING: ICD-10-CM

## 2024-10-14 DIAGNOSIS — Z00.00 ROUTINE GENERAL MEDICAL EXAMINATION AT A HEALTH CARE FACILITY: Primary | ICD-10-CM

## 2024-10-14 DIAGNOSIS — Z78.0 POSTMENOPAUSAL STATUS: ICD-10-CM

## 2024-10-14 LAB
ALBUMIN SERPL BCG-MCNC: 4.5 G/DL (ref 3.5–5.2)
ALP SERPL-CCNC: 57 U/L (ref 40–150)
ALT SERPL W P-5'-P-CCNC: 13 U/L (ref 0–50)
ANION GAP SERPL CALCULATED.3IONS-SCNC: 10 MMOL/L (ref 7–15)
AST SERPL W P-5'-P-CCNC: 26 U/L (ref 0–45)
BILIRUB SERPL-MCNC: 0.7 MG/DL
BUN SERPL-MCNC: 12.3 MG/DL (ref 6–20)
CALCIUM SERPL-MCNC: 9.7 MG/DL (ref 8.8–10.4)
CHLORIDE SERPL-SCNC: 102 MMOL/L (ref 98–107)
CHOLEST SERPL-MCNC: 178 MG/DL
CREAT SERPL-MCNC: 0.75 MG/DL (ref 0.51–0.95)
EGFRCR SERPLBLD CKD-EPI 2021: >90 ML/MIN/1.73M2
ERYTHROCYTE [DISTWIDTH] IN BLOOD BY AUTOMATED COUNT: 11.4 % (ref 10–15)
EST. AVERAGE GLUCOSE BLD GHB EST-MCNC: 91 MG/DL
FASTING STATUS PATIENT QL REPORTED: NO
FASTING STATUS PATIENT QL REPORTED: NO
GLUCOSE SERPL-MCNC: 54 MG/DL (ref 70–99)
HBA1C MFR BLD: 4.8 % (ref 0–5.6)
HCO3 SERPL-SCNC: 28 MMOL/L (ref 22–29)
HCT VFR BLD AUTO: 38.5 % (ref 35–47)
HDLC SERPL-MCNC: 63 MG/DL
HGB BLD-MCNC: 13.3 G/DL (ref 11.7–15.7)
LDLC SERPL CALC-MCNC: 100 MG/DL
MCH RBC QN AUTO: 30.8 PG (ref 26.5–33)
MCHC RBC AUTO-ENTMCNC: 34.5 G/DL (ref 31.5–36.5)
MCV RBC AUTO: 89 FL (ref 78–100)
NONHDLC SERPL-MCNC: 115 MG/DL
PLATELET # BLD AUTO: 203 10E3/UL (ref 150–450)
POTASSIUM SERPL-SCNC: 3.7 MMOL/L (ref 3.4–5.3)
PROT SERPL-MCNC: 6.8 G/DL (ref 6.4–8.3)
RBC # BLD AUTO: 4.32 10E6/UL (ref 3.8–5.2)
SODIUM SERPL-SCNC: 140 MMOL/L (ref 135–145)
TRIGL SERPL-MCNC: 76 MG/DL
WBC # BLD AUTO: 4.6 10E3/UL (ref 4–11)

## 2024-10-14 PROCEDURE — 99213 OFFICE O/P EST LOW 20 MIN: CPT | Mod: 25 | Performed by: FAMILY MEDICINE

## 2024-10-14 PROCEDURE — 96127 BRIEF EMOTIONAL/BEHAV ASSMT: CPT | Performed by: FAMILY MEDICINE

## 2024-10-14 PROCEDURE — 83036 HEMOGLOBIN GLYCOSYLATED A1C: CPT | Performed by: FAMILY MEDICINE

## 2024-10-14 PROCEDURE — 80053 COMPREHEN METABOLIC PANEL: CPT | Performed by: FAMILY MEDICINE

## 2024-10-14 PROCEDURE — G0145 SCR C/V CYTO,THINLAYER,RESCR: HCPCS | Performed by: FAMILY MEDICINE

## 2024-10-14 PROCEDURE — 90471 IMMUNIZATION ADMIN: CPT | Performed by: FAMILY MEDICINE

## 2024-10-14 PROCEDURE — 80061 LIPID PANEL: CPT | Performed by: FAMILY MEDICINE

## 2024-10-14 PROCEDURE — 36415 COLL VENOUS BLD VENIPUNCTURE: CPT | Performed by: FAMILY MEDICINE

## 2024-10-14 PROCEDURE — 90673 RIV3 VACCINE NO PRESERV IM: CPT | Performed by: FAMILY MEDICINE

## 2024-10-14 PROCEDURE — 91320 SARSCV2 VAC 30MCG TRS-SUC IM: CPT | Performed by: FAMILY MEDICINE

## 2024-10-14 PROCEDURE — 87624 HPV HI-RISK TYP POOLED RSLT: CPT | Performed by: FAMILY MEDICINE

## 2024-10-14 PROCEDURE — 85027 COMPLETE CBC AUTOMATED: CPT | Performed by: FAMILY MEDICINE

## 2024-10-14 PROCEDURE — 90480 ADMN SARSCOV2 VAC 1/ONLY CMP: CPT | Performed by: FAMILY MEDICINE

## 2024-10-14 PROCEDURE — 99396 PREV VISIT EST AGE 40-64: CPT | Mod: 25 | Performed by: FAMILY MEDICINE

## 2024-10-14 RX ORDER — ESCITALOPRAM OXALATE 5 MG/1
7.5 TABLET ORAL DAILY
Qty: 45 TABLET | Refills: 11 | Status: SHIPPED | OUTPATIENT
Start: 2024-10-14 | End: 2024-11-13

## 2024-10-14 SDOH — HEALTH STABILITY: PHYSICAL HEALTH: ON AVERAGE, HOW MANY DAYS PER WEEK DO YOU ENGAGE IN MODERATE TO STRENUOUS EXERCISE (LIKE A BRISK WALK)?: 6 DAYS

## 2024-10-14 ASSESSMENT — ANXIETY QUESTIONNAIRES
1. FEELING NERVOUS, ANXIOUS, OR ON EDGE: NOT AT ALL
2. NOT BEING ABLE TO STOP OR CONTROL WORRYING: NOT AT ALL
GAD7 TOTAL SCORE: 0
8. IF YOU CHECKED OFF ANY PROBLEMS, HOW DIFFICULT HAVE THESE MADE IT FOR YOU TO DO YOUR WORK, TAKE CARE OF THINGS AT HOME, OR GET ALONG WITH OTHER PEOPLE?: NOT DIFFICULT AT ALL
IF YOU CHECKED OFF ANY PROBLEMS ON THIS QUESTIONNAIRE, HOW DIFFICULT HAVE THESE PROBLEMS MADE IT FOR YOU TO DO YOUR WORK, TAKE CARE OF THINGS AT HOME, OR GET ALONG WITH OTHER PEOPLE: NOT DIFFICULT AT ALL
5. BEING SO RESTLESS THAT IT IS HARD TO SIT STILL: NOT AT ALL
GAD7 TOTAL SCORE: 0
7. FEELING AFRAID AS IF SOMETHING AWFUL MIGHT HAPPEN: NOT AT ALL
7. FEELING AFRAID AS IF SOMETHING AWFUL MIGHT HAPPEN: NOT AT ALL
3. WORRYING TOO MUCH ABOUT DIFFERENT THINGS: NOT AT ALL
4. TROUBLE RELAXING: NOT AT ALL
6. BECOMING EASILY ANNOYED OR IRRITABLE: NOT AT ALL
GAD7 TOTAL SCORE: 0

## 2024-10-14 ASSESSMENT — SOCIAL DETERMINANTS OF HEALTH (SDOH): HOW OFTEN DO YOU GET TOGETHER WITH FRIENDS OR RELATIVES?: MORE THAN THREE TIMES A WEEK

## 2024-10-14 ASSESSMENT — PATIENT HEALTH QUESTIONNAIRE - PHQ9
SUM OF ALL RESPONSES TO PHQ QUESTIONS 1-9: 2
SUM OF ALL RESPONSES TO PHQ QUESTIONS 1-9: 2
10. IF YOU CHECKED OFF ANY PROBLEMS, HOW DIFFICULT HAVE THESE PROBLEMS MADE IT FOR YOU TO DO YOUR WORK, TAKE CARE OF THINGS AT HOME, OR GET ALONG WITH OTHER PEOPLE: NOT DIFFICULT AT ALL

## 2024-10-14 ASSESSMENT — PAIN SCALES - GENERAL: PAINLEVEL: NO PAIN (0)

## 2024-10-14 NOTE — PROGRESS NOTES
Preventive Care Visit  [facility]  Shayan Warren MD, Family Medicine  Oct 14, 2024      Assessment & Plan     Letitia was seen today for physical.    Diagnoses and all orders for this visit:    Routine general medical examination at a health care facility  -     Hemoglobin A1c; Future  -     Lipid panel reflex to direct LDL Fasting; Future  -     Comprehensive metabolic panel; Future  -     CBC with platelets; Future    Cervical cancer screening  -     HPV and Gynecologic Cytology Panel - Recommended Age 30 - 65 Years    Postmenopausal status  Routine screening for bone density.   -     DX Bone Density; Future    Functional diarrhea  Patient went to [facility] and had gastroenteritis. She was treated with an antibiotic. States that all her GI symptoms improved after taking the antibiotic. Suspects a bacteria infection causing her chronic GI symptoms.   -     Helicobacter pylori Antigen Stool; Future  -     Enteric Bacteria and Virus Panel by [facility] Stool; Future    Adjustment disorder with depressed mood  Mental health improved. Desires to start tapering down lexapro. Desires to do a slow tapering to avoid rebound depression.   -     escitalopram (LEXAPRO) 5 MG tablet; Take 1.5 tablets (7.5 mg) by mouth daily.    Other orders  -     REVIEW OF HEALTH MAINTENANCE PROTOCOL ORDERS  -     INFLUENZA VACCINE TRIVALENT(FLUBLOK)  -     COVID-19 12+ (PFIZER)  -     PRIMARY CARE FOLLOW-UP SCHEDULING; Future         Follow-up Visit   Expected date:  Oct 14, 2025 (Approximate)      Follow Up Appointment Details:     Follow-up with whom?: PCP    Follow-Up for what?: Adult Preventive    How?: In Person                       Counseling  Appropriate preventive services were addressed with this patient via screening, questionnaire, or discussion as appropriate for fall prevention, nutrition, physical activity, Tobacco-use cessation, social engagement, weight loss and cognition.  Checklist reviewing preventive  services available has been given to the patient.  Reviewed patient's diet, addressing concerns and/or questions.       Rafa Dong is a 53 year old, presenting for the following:  Physical        10/14/2024    11:02 AM   Additional Questions   Roomed by Neel MORENO        Health Care Directive  Patient does not have a Health Care Directive or Living Will: Discussed advance care planning with patient; information given to patient to review.    HPI  Had IBS when she was younger. Had bloating and pain. As she grew older, she had stomach symptoms, eliminated dairy and increased her fiber intake. Patient went to [facility] and had gastroenteritis. She was treated with an antibiotic. States that all her GI symptoms improved after taking the antibiotic. Suspects a bacteria infection causing her chronic GI symptoms.           10/18/2021     1:20 PM 7/20/2023    11:13 AM 10/14/2024    11:04 AM   PHQ   PHQ-9 Total Score 0 2 2   Q9: Thoughts of better off dead/self-harm past 2 weeks Not at all Not at all Not at all         10/18/2021     1:20 PM 7/20/2023    11:14 AM 10/14/2024    11:05 AM   JOSE-7 SCORE   Total Score  2 (minimal anxiety) 0 (minimal anxiety)   Total Score 0 2 0                 10/14/2024   General Health   How would you rate your overall physical health? Excellent   Feel stress (tense, anxious, or unable to sleep) Not at all            10/14/2024   Nutrition   Three or more servings of calcium each day? (!) I DON'T KNOW   Diet: Other   If other, please elaborate: intermittent fasting   How many servings of fruit and vegetables per day? 4 or more   How many sweetened beverages each day? 0-1            10/14/2024   Exercise   Days per week of moderate/strenous exercise 6 days            10/14/2024   Social Factors   Frequency of gathering with friends or relatives More than three times a week   Worry food won't last until get money to buy more No   Food not last or not have enough money for food? No   Do you  have housing? (Housing is defined as stable permanent housing and does not include staying ouside in a car, in a tent, in an abandoned building, in an overnight shelter, or couch-surfing.) Yes   Are you worried about losing your housing? No   Lack of transportation? No   Unable to get utilities (heat,electricity)? No            10/14/2024   Fall Risk   Fallen 2 or more times in the past year? No   Trouble with walking or balance? No             10/14/2024   Dental   Dentist two times every year? Yes            10/14/2024   TB Screening   Were you born outside of the [place]? No          Today's PHQ-9 Score:       10/14/2024    11:04 AM   PHQ-9 SCORE   PHQ-9 Total Score MyChart 2 (Minimal depression)   PHQ-9 Total Score 2         10/14/2024   Substance Use   Alcohol more than 3/day or more than 7/wk No   Do you use any other substances recreationally? No        Social History     Tobacco Use    Smoking status: Former     Current packs/day: 0.00     Average packs/day: 1 pack/day for 3.0 years (3.0 ttl pk-yrs)     Types: Cigarettes     Start date:      Quit date:      Years since quittin.8    Smokeless tobacco: Never   Vaping Use    Vaping status: Never Used   Substance Use Topics    Alcohol use: Yes     Comment: Socially    Drug use: No           2024   LAST FHS-7 RESULTS   1st degree relative breast or ovarian cancer No   Any relative bilateral breast cancer No   Any male have breast cancer No   Any ONE woman have BOTH breast AND ovarian cancer No   Any woman with breast cancer before 50yrs No   2 or more relatives with breast AND/OR ovarian cancer No   2 or more relatives with breast AND/OR bowel cancer No      Fracture Risk Assessment Tool  Link to Frax Calculator  Use the information below to complete the Frax calculator  : 1970  Sex: female  Weight (kg): 62.1 kg (actual weight)  Height (cm): 161.3 cm  Previous Fragility Fracture:  No  History of parent with fractured hip:  No  Current  "Smoking:  No  Patient has been on glucocorticoids for more than 3 months (5mg/day or more): No  Rheumatoid Arthritis on Problem List:  No  Secondary Osteoporosis on Problem List:  No  Consumes 3 or more units of alcohol per day: No  Femoral Neck BMD (g/cm2)      Mammogram Screening - Mammogram every 1-2 years updated in Health Maintenance based on mutual decision making        10/14/2024   STI Screening   New sexual partner(s) since last STI/HIV test? No        History of abnormal Pap smear: No - age 30- 64 PAP with HPV every 5 years recommended        Latest Ref Rng & Units 11/18/2019     9:58 AM 11/18/2019     9:30 AM 4/9/2015    12:00 AM   PAP / HPV   PAP (Historical)   NIL     HPV 16 DNA NEG^Negative Negative      HPV 18 DNA NEG^Negative Negative      Other HR HPV NEG^Negative Negative      PAP-ABSTRACT    See Scanned Document           This result is from an external source.     ASCVD Risk   The 10-year ASCVD risk score (Jessie DALE, et al., 2019) is: 1.2%    Values used to calculate the score:      Age: 53 years      Sex: Female      Is Non- : No      Diabetic: No      Tobacco smoker: No      Systolic Blood Pressure: 126 mmHg      Is BP treated: No      HDL Cholesterol: 67 mg/dL      Total Cholesterol: 166 mg/dL       Reviewed and updated as needed this visit by Provider   Tobacco  Allergies  Meds  Problems  Med Hx  Surg Hx  Fam Hx              Review of Systems  Constitutional, neuro, ENT, endocrine, pulmonary, cardiac, gastrointestinal, genitourinary, musculoskeletal, integument and psychiatric systems are negative, except as otherwise noted.     Objective    Exam  /70   Pulse 56   Temp 97.3  F (36.3  C) (Temporal)   Resp 16   Ht 1.613 m (5' 3.5\")   Wt 62.1 kg (137 lb)   LMP  (LMP Unknown)   SpO2 99%   BMI 23.89 kg/m     Estimated body mass index is 23.89 kg/m  as calculated from the following:    Height as of this encounter: 1.613 m (5' 3.5\").    Weight as " of this encounter: 62.1 kg (137 lb).    Physical Exam  GENERAL: alert and no distress  EYES: Eyes grossly normal to inspection, PERRL and conjunctivae and sclerae normal  HENT: ear canals and TM's normal, nose and mouth without ulcers or lesions  NECK: no adenopathy, no asymmetry, masses, or scars  RESP: lungs clear to auscultation - no rales, rhonchi or wheezes  CV: regular rate and rhythm, normal S1 S2, no S3 or S4, no murmur, click or rub, no peripheral edema  ABDOMEN: soft, nontender, no hepatosplenomegaly, no masses and bowel sounds normal   (female): normal female external genitalia, normal urethral meatus, normal vaginal mucosa  MS: no gross musculoskeletal defects noted, no edema  SKIN: no suspicious lesions or rashes  NEURO: Normal strength and tone, mentation intact and speech normal  PSYCH: mentation appears normal, affect normal/bright        Signed Electronically by: Shayan Warren MD

## 2024-10-14 NOTE — PATIENT INSTRUCTIONS
Patient Education   RADIOLOGY SCHEDULING (Including Mammograms, Ultrasound, CT and MRI scans and Dexa Scans):  - [facility]: [phone]  - [facility]: [phone]  - [facility]: [phone]  - Manpreet Alonso: [phone]       Preventive Care Advice   This is general advice given by our system to help you stay healthy. However, your care team may have specific advice just for you. Please talk to your care team about your preventive care needs.  Nutrition  Eat 5 or more servings of fruits and vegetables each day.  Try wheat bread, brown rice and whole grain pasta (instead of white bread, rice, and pasta).  Get enough calcium and vitamin D. Check the label on foods and aim for 100% of the RDA (recommended daily allowance).  Lifestyle  Exercise at least 150 minutes each week  (30 minutes a day, 5 days a week).  Do muscle strengthening activities 2 days a week. These help control your weight and prevent disease.  No smoking.  Wear sunscreen to prevent skin cancer.  Have a dental exam and cleaning every 6 months.  Yearly exams  See your health care team every year to talk about:  Any changes in your health.  Any medicines your care team has prescribed.  Preventive care, family planning, and ways to prevent chronic diseases.  Shots (vaccines)   HPV shots (up to age 26), if you've never had them before.  Hepatitis B shots (up to age 59), if you've never had them before.  COVID-19 shot: Get this shot when it's due.  Flu shot: Get a flu shot every year.  Tetanus shot: Get a tetanus shot every 10 years.  Pneumococcal, hepatitis A, and RSV shots: Ask your care team if you need these based on your risk.  Shingles shot (for age 50 and up)  General health tests  Diabetes screening:  Starting at age 35, Get screened for diabetes at least every 3 years.  If you are younger than age 35, ask your care team if you should be screened for  diabetes.  Cholesterol test: At age 39, start having a cholesterol test every 5 years, or more often if advised.  Bone density scan (DEXA): At age 50, ask your care team if you should have this scan for osteoporosis (brittle bones).  Hepatitis C: Get tested at least once in your life.  STIs (sexually transmitted infections)  Before age 24: Ask your care team if you should be screened for STIs.  After age 24: Get screened for STIs if you're at risk. You are at risk for STIs (including HIV) if:  You are sexually active with more than one person.  You don't use condoms every time.  You or a partner was diagnosed with a sexually transmitted infection.  If you are at risk for HIV, ask about PrEP medicine to prevent HIV.  Get tested for HIV at least once in your life, whether you are at risk for HIV or not.  Cancer screening tests  Cervical cancer screening: If you have a cervix, begin getting regular cervical cancer screening tests starting at age 21.  Breast cancer scan (mammogram): If you've ever had breasts, begin having regular mammograms starting at age 40. This is a scan to check for breast cancer.  Colon cancer screening: It is important to start screening for colon cancer at age 45.  Have a colonoscopy test every 10 years (or more often if you're at risk) Or, ask your provider about stool tests like a FIT test every year or Cologuard test every 3 years.  To learn more about your testing options, visit:   .  For help making a decision, visit:   https://bit.ly/oc68806.  Prostate cancer screening test: If you have a prostate, ask your care team if a prostate cancer screening test (PSA) at age 55 is right for you.  Lung cancer screening: If you are a current or former smoker ages 50 to 80, ask your care team if ongoing lung cancer screenings are right for you.  For informational purposes only. Not to replace the advice of your health care provider. Copyright   2023 [employer]. All rights reserved.  Clinically reviewed by the [facility]. [employer] [id number] - REV 01/24.

## 2024-10-15 LAB
HPV HR 12 DNA CVX QL NAA+PROBE: NEGATIVE
HPV16 DNA CVX QL NAA+PROBE: NEGATIVE
HPV18 DNA CVX QL NAA+PROBE: NEGATIVE
HUMAN PAPILLOMA VIRUS FINAL DIAGNOSIS: NORMAL

## 2024-10-18 LAB
BKR AP ASSOCIATED HPV REPORT: NORMAL
BKR LAB AP GYN ADEQUACY: NORMAL
BKR LAB AP GYN INTERPRETATION: NORMAL
BKR LAB AP PREVIOUS ABNORMAL: NORMAL
PATH REPORT.COMMENTS IMP SPEC: NORMAL
PATH REPORT.COMMENTS IMP SPEC: NORMAL
PATH REPORT.RELEVANT HX SPEC: NORMAL

## 2024-10-20 NOTE — RESULT ENCOUNTER NOTE
Dear Letitia,   The results are within the expected range. Please continue with the current plan of care, and feel free to reach out if you have any questions or concerns.    Best Regards   Shayan Warren MD

## 2024-10-28 DIAGNOSIS — F43.21 ADJUSTMENT DISORDER WITH DEPRESSED MOOD: ICD-10-CM

## 2024-10-28 RX ORDER — ESCITALOPRAM OXALATE 10 MG/1
10 TABLET ORAL DAILY
Qty: 30 TABLET | Refills: 0 | OUTPATIENT
Start: 2024-10-28

## 2024-11-25 DIAGNOSIS — B00.1 COLD SORE: ICD-10-CM

## 2024-11-25 RX ORDER — VALACYCLOVIR HYDROCHLORIDE 1 G/1
2000 TABLET, FILM COATED ORAL 2 TIMES DAILY
Qty: 4 TABLET | Refills: 4 | Status: SHIPPED | OUTPATIENT
Start: 2024-11-25

## 2024-12-16 LAB — H PYLORI AG STL QL IA: NEGATIVE

## 2025-03-20 ENCOUNTER — MYC REFILL (OUTPATIENT)
Dept: FAMILY MEDICINE | Facility: CLINIC | Age: 55
End: 2025-03-20
Payer: COMMERCIAL

## 2025-03-20 DIAGNOSIS — J45.20 MILD INTERMITTENT REACTIVE AIRWAY DISEASE WITHOUT COMPLICATION: ICD-10-CM

## 2025-03-20 RX ORDER — ALBUTEROL SULFATE 90 UG/1
2 INHALANT RESPIRATORY (INHALATION) EVERY 4 HOURS PRN
Qty: 18 G | Refills: 3 | Status: SHIPPED | OUTPATIENT
Start: 2025-03-20

## 2025-03-20 NOTE — TELEPHONE ENCOUNTER
Clinic RN: Please investigate patient's chart or contact patient if the information cannot be found because  This has not been prescribed since 2019

## 2025-03-20 NOTE — TELEPHONE ENCOUNTER
FS,  Please see below MyChart message and advise.  Appears last OV 10/14/24  Last prescribed 05/2018  Could advise visit to discuss?  Thanks,  Jaquelin RUBIN RN

## 2025-04-28 ENCOUNTER — HOSPITAL ENCOUNTER (OUTPATIENT)
Dept: CARDIOLOGY | Facility: CLINIC | Age: 55
Discharge: HOME | End: 2025-04-28
Attending: INTERNAL MEDICINE | Admitting: INTERNAL MEDICINE
Payer: COMMERCIAL

## 2025-04-28 DIAGNOSIS — I34.1 MITRAL VALVE PROLAPSE: ICD-10-CM

## 2025-04-28 DIAGNOSIS — I34.0 NONRHEUMATIC MITRAL VALVE REGURGITATION: ICD-10-CM

## 2025-04-28 LAB — LVEF ECHO: NORMAL

## 2025-04-28 PROCEDURE — 93306 TTE W/DOPPLER COMPLETE: CPT | Mod: 26 | Performed by: INTERNAL MEDICINE

## 2025-04-28 PROCEDURE — 93306 TTE W/DOPPLER COMPLETE: CPT

## 2025-04-29 ENCOUNTER — OFFICE VISIT (OUTPATIENT)
Dept: CARDIOLOGY | Facility: CLINIC | Age: 55
End: 2025-04-29
Payer: COMMERCIAL

## 2025-04-29 VITALS
BODY MASS INDEX: 23.52 KG/M2 | WEIGHT: 137.8 LBS | SYSTOLIC BLOOD PRESSURE: 124 MMHG | HEART RATE: 70 BPM | DIASTOLIC BLOOD PRESSURE: 78 MMHG | OXYGEN SATURATION: 97 % | HEIGHT: 64 IN

## 2025-04-29 DIAGNOSIS — I34.0 MYXOMATOUS MITRAL VALVE REGURGITATION: Primary | ICD-10-CM

## 2025-04-29 RX ORDER — VALACYCLOVIR HYDROCHLORIDE 1 G/1
2000 TABLET, FILM COATED ORAL 2 TIMES DAILY
Qty: 4 TABLET | Refills: 4 | Status: CANCELLED | OUTPATIENT
Start: 2025-04-29

## 2025-04-29 NOTE — LETTER
4/29/2025    Shayan Warren MD  [address] [facility]  [place] [address]    RE: Letitia Yee       Dear Colleague,     I had the pleasure of seeing Letitia Yee in the [facility].      SERVICE DATE: April 29, 2025      DIAGNOSES/ASSESSMENT:    1.  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  No interval change in symptoms, physical examination, exercise tolerance or echocardiographic findings.    PLAN:    1. All testing reviewed in detail with Letitia and congratulated her on her excellent lifestyle.  2. Follow-up with me every 2 years with an echocardiogram, fasting lipids.    3. She will contact me if she develops interval symptoms.    Kelli Solis MD  Cardiology      REASON FOR VISIT:  Follow-up of mitral valve regurgitation secondary to myxomatous mitral valve disease.     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    She is 54 years old, physically active, has physiologic sinus bradycardia, mother of 2 children, normal BMI of 24.Remains asymptomatic, exercises daily with walking, running and strength training.    I reviewed her recent transthoracic echocardiogram dated 4/28/2025.  It shows myxomatous mitral valve leaflets with mild late systolic mitral valve regurgitation.  Normal left ventricular size with an end-systolic diameter of 3.4 cm.  LVEF is 70%.  Left atrium is mildly dilated with a volume index of 32 mL/m2.  Normal right ventricle.  Trace tricuspid valve regurgitation.  Pulmonary artery pressure could not be estimated.    Her labs look good.  Total cholesterol 178, ,  "creatinine 0.75, GFR greater than 90, sodium 140, potassium 3.7, hemoglobin 13.3, HbA1c 4.8%.    Physical examination shows /78, pulse 70, height is 5 feet 4 inches, weight 137 pounds, BMI 24.  Regular heart sounds.  Well audible midsystolic click with a late systolic murmur radiating to the axilla.  Lungs are clear no lower extremity edema.    Established patient.   Moderate complexity medical decision making. Total time today 30 minutes.    The longitudinal plan of care for the condition(s) below were addressed during this visit. Due to the added complexity in care, I will continue to support Letitia in the subsequent management of this condition(s) and with the ongoing continuity of care of this condition(s).  Mitral valve regurgitation.    This note was completed in part using dictation via the Dragon voice recognition software. Some word and grammatical errors may occur and must be interpreted in the appropriate clinical context. If there are any questions pertaining to this issue, please contact me for further clarification.     Orders Placed This Encounter   Procedures     Follow-Up with Cardiologist         Vitals: /78 (BP Location: Right arm, Patient Position: Sitting, Cuff Size: Adult Regular)   Pulse 70   Ht 1.613 m (5' 3.5\")   Wt 62.5 kg (137 lb 12.8 oz)   LMP  (LMP Unknown)   SpO2 97%   BMI 24.03 kg/m        CURRENT MEDICATIONS:  Current Outpatient Medications   Medication Sig Dispense Refill     albuterol (PROAIR HFA/PROVENTIL HFA/VENTOLIN HFA) 108 (90 Base) MCG/ACT inhaler Inhale 2 puffs into the lungs every 4 hours as needed for shortness of breath or wheezing. 18 g 3     LORazepam (ATIVAN) 0.5 MG tablet Take 1 tablet (0.5 mg) by mouth nightly as needed for agitation, anxiety or sleep 30 tablet 1     valACYclovir (VALTREX) 1000 mg tablet TAKE 2 TABLETS(2000 MG) BY MOUTH TWICE DAILY (Patient taking differently: Take 2,000 mg by mouth as needed.) 4 tablet 4     Vitamin D, " Cholecalciferol, 10 MCG (400 UNIT) CHEW Take by mouth.           ALLERGIES:  Allergies   Allergen Reactions     [place]      Mouth pain     Diphenhydramine Other (See Comments)     PN: Numbness and tingling.- 04/16/21: States she can take this     Seasonal Allergies      Adhesive Tape Rash             Thank you for allowing me to participate in the care of your patient.      Sincerely,     Kelli Solis MD     [facility]  cc:   Kelli Solis MD  [address] [place] [id number]

## (undated) RX ORDER — FENTANYL CITRATE 50 UG/ML
INJECTION, SOLUTION INTRAMUSCULAR; INTRAVENOUS
Status: DISPENSED
Start: 2020-01-30

## (undated) RX ORDER — LIDOCAINE HYDROCHLORIDE 20 MG/ML
JELLY TOPICAL
Status: DISPENSED
Start: 2020-01-30

## (undated) RX ORDER — METOPROLOL TARTRATE 1 MG/ML
INJECTION, SOLUTION INTRAVENOUS
Status: DISPENSED
Start: 2018-08-29

## (undated) RX ORDER — NITROGLYCERIN 0.4 MG/1
TABLET SUBLINGUAL
Status: DISPENSED
Start: 2018-08-29